# Patient Record
Sex: MALE | Race: WHITE | NOT HISPANIC OR LATINO
[De-identification: names, ages, dates, MRNs, and addresses within clinical notes are randomized per-mention and may not be internally consistent; named-entity substitution may affect disease eponyms.]

---

## 2017-01-03 ENCOUNTER — APPOINTMENT (OUTPATIENT)
Dept: OTOLARYNGOLOGY | Facility: CLINIC | Age: 62
End: 2017-01-03

## 2017-01-06 RX ORDER — CIPROFLOXACIN AND DEXAMETHASONE 3; 1 MG/ML; MG/ML
0.3-0.1 SUSPENSION/ DROPS AURICULAR (OTIC)
Qty: 5 | Refills: 3 | Status: DISCONTINUED | COMMUNITY
Start: 2017-01-03 | End: 2017-01-06

## 2017-01-10 ENCOUNTER — APPOINTMENT (OUTPATIENT)
Dept: OTOLARYNGOLOGY | Facility: CLINIC | Age: 62
End: 2017-01-10

## 2017-01-10 VITALS
SYSTOLIC BLOOD PRESSURE: 106 MMHG | BODY MASS INDEX: 27.4 KG/M2 | DIASTOLIC BLOOD PRESSURE: 70 MMHG | WEIGHT: 225 LBS | HEART RATE: 89 BPM | HEIGHT: 76 IN

## 2017-01-27 ENCOUNTER — MEDICATION RENEWAL (OUTPATIENT)
Age: 62
End: 2017-01-27

## 2017-02-07 ENCOUNTER — FORM ENCOUNTER (OUTPATIENT)
Age: 62
End: 2017-02-07

## 2017-02-08 ENCOUNTER — APPOINTMENT (OUTPATIENT)
Dept: SURGICAL ONCOLOGY | Facility: CLINIC | Age: 62
End: 2017-02-08

## 2017-02-08 ENCOUNTER — OUTPATIENT (OUTPATIENT)
Dept: OUTPATIENT SERVICES | Facility: HOSPITAL | Age: 62
LOS: 1 days | End: 2017-02-08
Payer: MEDICARE

## 2017-02-08 ENCOUNTER — APPOINTMENT (OUTPATIENT)
Dept: CT IMAGING | Facility: CLINIC | Age: 62
End: 2017-02-08

## 2017-02-08 VITALS
RESPIRATION RATE: 18 BRPM | HEART RATE: 90 BPM | OXYGEN SATURATION: 99 % | WEIGHT: 225 LBS | HEIGHT: 76 IN | SYSTOLIC BLOOD PRESSURE: 100 MMHG | BODY MASS INDEX: 27.4 KG/M2 | DIASTOLIC BLOOD PRESSURE: 68 MMHG

## 2017-02-08 DIAGNOSIS — C34.92 MALIGNANT NEOPLASM OF UNSPECIFIED PART OF LEFT BRONCHUS OR LUNG: Chronic | ICD-10-CM

## 2017-02-08 DIAGNOSIS — R06.02 SHORTNESS OF BREATH: ICD-10-CM

## 2017-02-08 DIAGNOSIS — E89.0 POSTPROCEDURAL HYPOTHYROIDISM: Chronic | ICD-10-CM

## 2017-02-08 DIAGNOSIS — C73 MALIGNANT NEOPLASM OF THYROID GLAND: ICD-10-CM

## 2017-02-08 DIAGNOSIS — C34.92 MALIGNANT NEOPLASM OF UNSPECIFIED PART OF LEFT BRONCHUS OR LUNG: ICD-10-CM

## 2017-02-08 PROCEDURE — 71250 CT THORAX DX C-: CPT

## 2017-02-13 ENCOUNTER — RESULT REVIEW (OUTPATIENT)
Age: 62
End: 2017-02-13

## 2017-03-15 ENCOUNTER — APPOINTMENT (OUTPATIENT)
Dept: PULMONOLOGY | Facility: CLINIC | Age: 62
End: 2017-03-15

## 2017-03-16 ENCOUNTER — APPOINTMENT (OUTPATIENT)
Dept: OTOLARYNGOLOGY | Facility: CLINIC | Age: 62
End: 2017-03-16

## 2017-03-16 VITALS
BODY MASS INDEX: 27.52 KG/M2 | WEIGHT: 226 LBS | SYSTOLIC BLOOD PRESSURE: 113 MMHG | HEIGHT: 76 IN | HEART RATE: 96 BPM | DIASTOLIC BLOOD PRESSURE: 75 MMHG

## 2017-03-16 DIAGNOSIS — H91.93 UNSPECIFIED HEARING LOSS, BILATERAL: ICD-10-CM

## 2017-04-10 ENCOUNTER — APPOINTMENT (OUTPATIENT)
Dept: PULMONOLOGY | Facility: CLINIC | Age: 62
End: 2017-04-10

## 2017-04-10 VITALS
BODY MASS INDEX: 27.52 KG/M2 | HEART RATE: 101 BPM | RESPIRATION RATE: 18 BRPM | OXYGEN SATURATION: 97 % | WEIGHT: 226 LBS | DIASTOLIC BLOOD PRESSURE: 80 MMHG | HEIGHT: 76 IN | SYSTOLIC BLOOD PRESSURE: 120 MMHG

## 2017-05-05 ENCOUNTER — RX RENEWAL (OUTPATIENT)
Age: 62
End: 2017-05-05

## 2017-05-11 ENCOUNTER — OUTPATIENT (OUTPATIENT)
Dept: OUTPATIENT SERVICES | Facility: HOSPITAL | Age: 62
LOS: 1 days | End: 2017-05-11
Payer: MEDICARE

## 2017-05-11 VITALS
DIASTOLIC BLOOD PRESSURE: 72 MMHG | HEIGHT: 76 IN | OXYGEN SATURATION: 98 % | TEMPERATURE: 99 F | WEIGHT: 227.08 LBS | RESPIRATION RATE: 16 BRPM | SYSTOLIC BLOOD PRESSURE: 108 MMHG | HEART RATE: 96 BPM

## 2017-05-11 DIAGNOSIS — Z98.890 OTHER SPECIFIED POSTPROCEDURAL STATES: Chronic | ICD-10-CM

## 2017-05-11 DIAGNOSIS — K22.70 BARRETT'S ESOPHAGUS WITHOUT DYSPLASIA: ICD-10-CM

## 2017-05-11 DIAGNOSIS — C34.92 MALIGNANT NEOPLASM OF UNSPECIFIED PART OF LEFT BRONCHUS OR LUNG: Chronic | ICD-10-CM

## 2017-05-11 DIAGNOSIS — G47.33 OBSTRUCTIVE SLEEP APNEA (ADULT) (PEDIATRIC): ICD-10-CM

## 2017-05-11 DIAGNOSIS — Z01.818 ENCOUNTER FOR OTHER PREPROCEDURAL EXAMINATION: ICD-10-CM

## 2017-05-11 DIAGNOSIS — Z86.010 PERSONAL HISTORY OF COLONIC POLYPS: ICD-10-CM

## 2017-05-11 DIAGNOSIS — E89.0 POSTPROCEDURAL HYPOTHYROIDISM: Chronic | ICD-10-CM

## 2017-05-11 LAB
ANION GAP SERPL CALC-SCNC: 16 MMOL/L — SIGNIFICANT CHANGE UP (ref 5–17)
BUN SERPL-MCNC: 16 MG/DL — SIGNIFICANT CHANGE UP (ref 7–23)
CALCIUM SERPL-MCNC: 9.8 MG/DL — SIGNIFICANT CHANGE UP (ref 8.4–10.5)
CHLORIDE SERPL-SCNC: 103 MMOL/L — SIGNIFICANT CHANGE UP (ref 96–108)
CO2 SERPL-SCNC: 22 MMOL/L — SIGNIFICANT CHANGE UP (ref 22–31)
CREAT SERPL-MCNC: 0.97 MG/DL — SIGNIFICANT CHANGE UP (ref 0.5–1.3)
GLUCOSE SERPL-MCNC: 110 MG/DL — HIGH (ref 70–99)
HCT VFR BLD CALC: 43.1 % — SIGNIFICANT CHANGE UP (ref 39–50)
HGB BLD-MCNC: 13.8 G/DL — SIGNIFICANT CHANGE UP (ref 13–17)
MCHC RBC-ENTMCNC: 27.6 PG — SIGNIFICANT CHANGE UP (ref 27–34)
MCHC RBC-ENTMCNC: 32 GM/DL — SIGNIFICANT CHANGE UP (ref 32–36)
MCV RBC AUTO: 86.2 FL — SIGNIFICANT CHANGE UP (ref 80–100)
PLATELET # BLD AUTO: 302 K/UL — SIGNIFICANT CHANGE UP (ref 150–400)
POTASSIUM SERPL-MCNC: 4.1 MMOL/L — SIGNIFICANT CHANGE UP (ref 3.5–5.3)
POTASSIUM SERPL-SCNC: 4.1 MMOL/L — SIGNIFICANT CHANGE UP (ref 3.5–5.3)
RBC # BLD: 5 M/UL — SIGNIFICANT CHANGE UP (ref 4.2–5.8)
RBC # FLD: 12.9 % — SIGNIFICANT CHANGE UP (ref 10.3–14.5)
SODIUM SERPL-SCNC: 141 MMOL/L — SIGNIFICANT CHANGE UP (ref 135–145)
WBC # BLD: 5.73 K/UL — SIGNIFICANT CHANGE UP (ref 3.8–10.5)
WBC # FLD AUTO: 5.73 K/UL — SIGNIFICANT CHANGE UP (ref 3.8–10.5)

## 2017-05-11 PROCEDURE — G0463: CPT

## 2017-05-11 PROCEDURE — 85027 COMPLETE CBC AUTOMATED: CPT

## 2017-05-11 PROCEDURE — 80048 BASIC METABOLIC PNL TOTAL CA: CPT

## 2017-05-11 PROCEDURE — 36415 COLL VENOUS BLD VENIPUNCTURE: CPT

## 2017-05-11 NOTE — H&P PST ADULT - PSH
H/O thyroidectomy  total 4/ 2016  History of lung surgery  2015 benign lesion @ left lung  History of prostate surgery  TURP 2014  Lung cancer, left  left upper lobe VATS 2010  Mediastinum Neoplasm  S/P mediastinoscopy 1998  S/P ear surgery  unspecified cholesteatoma - right tympanomastoidectomy 12/16/2016  S/P Lobectomy of Lung  right upper 1997, [facility]

## 2017-05-11 NOTE — H&P PST ADULT - RS GEN PE MLT RESP DETAILS PC
clear to auscultation bilaterally/diminished breath sounds, R/respirations non-labored/good air movement/airway patent

## 2017-05-11 NOTE — H&P PST ADULT - NEGATIVE CARDIOVASCULAR SYMPTOMS
no orthopnea/no paroxysmal nocturnal dyspnea/no peripheral edema/no claudication/no palpitations/no chest pain

## 2017-05-11 NOTE — H&P PST ADULT - OTHER CARE PROVIDERS
Dr Marvin-pulmonary-in [employer] [phone],  Dr [id number]-[phone] last seen 1 year ago, endo Gabriela Lee  [phone]

## 2017-05-11 NOTE — H&P PST ADULT - PMH
Abdominal hernia    Anxiety    Zamudio's esophagus without dysplasia    BPH (Benign Prostatic Hyperplasia)    COPD (Chronic Obstructive Pulmonary Disease)  treated with inhalers  Former smoker    GERD (Gastroesophageal Reflux Disease)    History of chemotherapy  and radiation 1998  History of colon polyps    Lung Cancer  right upper lobe 1997  Lung cancer, left  upper lobe 2010  LILY (Obstructive Sleep Apnea)  does not use CPAP, last used it 15 years ago  Pneumonia  1990  Thyroid nodule    Umbilical hernia    Unspecified cholesteatoma, right ear  12/2016

## 2017-05-11 NOTE — H&P PST ADULT - HISTORY OF PRESENT ILLNESS
61 year old male with past medical hx of lung cancer s/p  b/l lobectomies, COPD, sleep apnea, BPH,  thyroid cancer, underwent  total thyroidectomy 4/2016, GERD, Barretts, colon polyps, pt  presents to [facility] for scheduled EGD/colonoscopy on 5/18/17. Accompanied by wife.

## 2017-05-18 ENCOUNTER — OUTPATIENT (OUTPATIENT)
Dept: OUTPATIENT SERVICES | Facility: HOSPITAL | Age: 62
LOS: 1 days | End: 2017-05-18
Payer: MEDICARE

## 2017-05-18 ENCOUNTER — RESULT REVIEW (OUTPATIENT)
Age: 62
End: 2017-05-18

## 2017-05-18 DIAGNOSIS — Z98.890 OTHER SPECIFIED POSTPROCEDURAL STATES: Chronic | ICD-10-CM

## 2017-05-18 DIAGNOSIS — Z86.010 PERSONAL HISTORY OF COLONIC POLYPS: ICD-10-CM

## 2017-05-18 DIAGNOSIS — E89.0 POSTPROCEDURAL HYPOTHYROIDISM: Chronic | ICD-10-CM

## 2017-05-18 DIAGNOSIS — C34.92 MALIGNANT NEOPLASM OF UNSPECIFIED PART OF LEFT BRONCHUS OR LUNG: Chronic | ICD-10-CM

## 2017-05-18 DIAGNOSIS — K22.70 BARRETT'S ESOPHAGUS WITHOUT DYSPLASIA: ICD-10-CM

## 2017-05-18 DIAGNOSIS — Z01.818 ENCOUNTER FOR OTHER PREPROCEDURAL EXAMINATION: ICD-10-CM

## 2017-05-18 PROCEDURE — 45385 COLONOSCOPY W/LESION REMOVAL: CPT | Mod: PT

## 2017-05-18 PROCEDURE — 88305 TISSUE EXAM BY PATHOLOGIST: CPT

## 2017-05-18 PROCEDURE — 45381 COLONOSCOPY SUBMUCOUS NJX: CPT | Mod: PT

## 2017-05-18 PROCEDURE — 88305 TISSUE EXAM BY PATHOLOGIST: CPT | Mod: 26

## 2017-05-18 PROCEDURE — 43239 EGD BIOPSY SINGLE/MULTIPLE: CPT

## 2017-07-12 ENCOUNTER — APPOINTMENT (OUTPATIENT)
Dept: PULMONOLOGY | Facility: CLINIC | Age: 62
End: 2017-07-12

## 2017-08-21 ENCOUNTER — OUTPATIENT (OUTPATIENT)
Dept: OUTPATIENT SERVICES | Facility: HOSPITAL | Age: 62
LOS: 1 days | End: 2017-08-21
Payer: MEDICARE

## 2017-08-21 ENCOUNTER — APPOINTMENT (OUTPATIENT)
Dept: NUCLEAR MEDICINE | Facility: HOSPITAL | Age: 62
End: 2017-08-21
Payer: MEDICARE

## 2017-08-21 DIAGNOSIS — Z98.890 OTHER SPECIFIED POSTPROCEDURAL STATES: Chronic | ICD-10-CM

## 2017-08-21 DIAGNOSIS — C73 MALIGNANT NEOPLASM OF THYROID GLAND: ICD-10-CM

## 2017-08-21 DIAGNOSIS — C34.92 MALIGNANT NEOPLASM OF UNSPECIFIED PART OF LEFT BRONCHUS OR LUNG: Chronic | ICD-10-CM

## 2017-08-21 DIAGNOSIS — E89.0 POSTPROCEDURAL HYPOTHYROIDISM: Chronic | ICD-10-CM

## 2017-08-21 PROCEDURE — 99213 OFFICE O/P EST LOW 20 MIN: CPT

## 2017-08-22 ENCOUNTER — APPOINTMENT (OUTPATIENT)
Dept: NUCLEAR MEDICINE | Facility: HOSPITAL | Age: 62
End: 2017-08-22

## 2017-08-23 ENCOUNTER — APPOINTMENT (OUTPATIENT)
Dept: NUCLEAR MEDICINE | Facility: HOSPITAL | Age: 62
End: 2017-08-23

## 2017-08-25 ENCOUNTER — APPOINTMENT (OUTPATIENT)
Dept: NUCLEAR MEDICINE | Facility: HOSPITAL | Age: 62
End: 2017-08-25

## 2017-08-25 PROCEDURE — 96372 THER/PROPH/DIAG INJ SC/IM: CPT

## 2017-08-25 PROCEDURE — 78020 THYROID MET UPTAKE: CPT | Mod: 26

## 2017-08-25 PROCEDURE — A9528: CPT

## 2017-08-25 PROCEDURE — 78018 THYROID MET IMAGING BODY: CPT | Mod: 26

## 2017-08-25 PROCEDURE — 78018 THYROID MET IMAGING BODY: CPT

## 2017-08-25 PROCEDURE — 78020 THYROID MET UPTAKE: CPT

## 2017-08-28 ENCOUNTER — FORM ENCOUNTER (OUTPATIENT)
Age: 62
End: 2017-08-28

## 2017-08-29 ENCOUNTER — APPOINTMENT (OUTPATIENT)
Dept: PULMONOLOGY | Facility: CLINIC | Age: 62
End: 2017-08-29
Payer: MEDICARE

## 2017-08-29 ENCOUNTER — OUTPATIENT (OUTPATIENT)
Dept: OUTPATIENT SERVICES | Facility: HOSPITAL | Age: 62
LOS: 1 days | End: 2017-08-29
Payer: MEDICARE

## 2017-08-29 VITALS
BODY MASS INDEX: 27.28 KG/M2 | SYSTOLIC BLOOD PRESSURE: 114 MMHG | OXYGEN SATURATION: 99 % | HEART RATE: 89 BPM | DIASTOLIC BLOOD PRESSURE: 70 MMHG | RESPIRATION RATE: 17 BRPM | WEIGHT: 224 LBS | HEIGHT: 76 IN

## 2017-08-29 DIAGNOSIS — Z98.890 OTHER SPECIFIED POSTPROCEDURAL STATES: Chronic | ICD-10-CM

## 2017-08-29 DIAGNOSIS — E89.0 POSTPROCEDURAL HYPOTHYROIDISM: Chronic | ICD-10-CM

## 2017-08-29 DIAGNOSIS — C34.92 MALIGNANT NEOPLASM OF UNSPECIFIED PART OF LEFT BRONCHUS OR LUNG: ICD-10-CM

## 2017-08-29 DIAGNOSIS — C34.92 MALIGNANT NEOPLASM OF UNSPECIFIED PART OF LEFT BRONCHUS OR LUNG: Chronic | ICD-10-CM

## 2017-08-29 PROCEDURE — 99214 OFFICE O/P EST MOD 30 MIN: CPT | Mod: 25

## 2017-08-29 PROCEDURE — 94010 BREATHING CAPACITY TEST: CPT

## 2017-08-29 PROCEDURE — 71250 CT THORAX DX C-: CPT | Mod: 26

## 2017-08-29 PROCEDURE — 71250 CT THORAX DX C-: CPT

## 2017-08-29 RX ORDER — SODIUM SULFATE, POTASSIUM SULFATE, MAGNESIUM SULFATE 17.5; 3.13; 1.6 G/ML; G/ML; G/ML
17.5-3.13-1.6 SOLUTION, CONCENTRATE ORAL
Qty: 354 | Refills: 0 | Status: DISCONTINUED | COMMUNITY
Start: 2017-05-02 | End: 2017-08-29

## 2017-08-29 RX ORDER — CLARITHROMYCIN 500 MG/1
500 TABLET, FILM COATED ORAL
Qty: 20 | Refills: 0 | Status: DISCONTINUED | COMMUNITY
Start: 2017-06-02 | End: 2017-08-29

## 2017-10-17 ENCOUNTER — APPOINTMENT (OUTPATIENT)
Dept: OTOLARYNGOLOGY | Facility: CLINIC | Age: 62
End: 2017-10-17
Payer: MEDICARE

## 2017-10-17 VITALS
HEART RATE: 87 BPM | WEIGHT: 226 LBS | BODY MASS INDEX: 27.52 KG/M2 | DIASTOLIC BLOOD PRESSURE: 72 MMHG | SYSTOLIC BLOOD PRESSURE: 105 MMHG | HEIGHT: 76 IN

## 2017-10-17 PROCEDURE — 99214 OFFICE O/P EST MOD 30 MIN: CPT | Mod: 25

## 2017-10-17 PROCEDURE — 92504 EAR MICROSCOPY EXAMINATION: CPT

## 2017-10-27 NOTE — H&P PST ADULT - FAMILY HISTORY
Mother  Still living? No  Family history of heart disease, Age at diagnosis: Age Unknown     Father  Still living? No  Family history of pancreatic cancer, Age at diagnosis: Age Unknown     Sibling  Still living? Yes, Estimated age: 51-60  Family history of prostate cancer, Age at diagnosis: Age Unknown     Sibling  Still living? Yes, Estimated age: 61-70  Family history of heart disease, Age at diagnosis: Age Unknown 27-Oct-2017

## 2017-11-04 ENCOUNTER — EMERGENCY (EMERGENCY)
Facility: HOSPITAL | Age: 62
LOS: 1 days | End: 2017-11-04
Payer: MEDICARE

## 2017-11-04 DIAGNOSIS — C34.92 MALIGNANT NEOPLASM OF UNSPECIFIED PART OF LEFT BRONCHUS OR LUNG: Chronic | ICD-10-CM

## 2017-11-04 DIAGNOSIS — E89.0 POSTPROCEDURAL HYPOTHYROIDISM: Chronic | ICD-10-CM

## 2017-11-04 DIAGNOSIS — Z98.890 OTHER SPECIFIED POSTPROCEDURAL STATES: Chronic | ICD-10-CM

## 2017-11-04 PROCEDURE — 99283 EMERGENCY DEPT VISIT LOW MDM: CPT

## 2017-11-05 ENCOUNTER — EMERGENCY (EMERGENCY)
Facility: HOSPITAL | Age: 62
LOS: 1 days | Discharge: HOME | End: 2017-11-05
Attending: EMERGENCY MEDICINE | Admitting: EMERGENCY MEDICINE
Payer: MEDICARE

## 2017-11-05 VITALS
SYSTOLIC BLOOD PRESSURE: 97 MMHG | TEMPERATURE: 98 F | DIASTOLIC BLOOD PRESSURE: 59 MMHG | RESPIRATION RATE: 20 BRPM | HEART RATE: 113 BPM | OXYGEN SATURATION: 100 %

## 2017-11-05 VITALS — DIASTOLIC BLOOD PRESSURE: 69 MMHG | SYSTOLIC BLOOD PRESSURE: 115 MMHG | HEART RATE: 105 BPM

## 2017-11-05 DIAGNOSIS — Z98.890 OTHER SPECIFIED POSTPROCEDURAL STATES: ICD-10-CM

## 2017-11-05 DIAGNOSIS — Z90.2 ACQUIRED ABSENCE OF LUNG [PART OF]: ICD-10-CM

## 2017-11-05 DIAGNOSIS — Z98.890 OTHER SPECIFIED POSTPROCEDURAL STATES: Chronic | ICD-10-CM

## 2017-11-05 DIAGNOSIS — R10.30 LOWER ABDOMINAL PAIN, UNSPECIFIED: ICD-10-CM

## 2017-11-05 DIAGNOSIS — N48.89 OTHER SPECIFIED DISORDERS OF PENIS: ICD-10-CM

## 2017-11-05 DIAGNOSIS — Z90.79 ACQUIRED ABSENCE OF OTHER GENITAL ORGAN(S): ICD-10-CM

## 2017-11-05 DIAGNOSIS — Z90.89 ACQUIRED ABSENCE OF OTHER ORGANS: ICD-10-CM

## 2017-11-05 DIAGNOSIS — R33.9 RETENTION OF URINE, UNSPECIFIED: ICD-10-CM

## 2017-11-05 DIAGNOSIS — Z79.899 OTHER LONG TERM (CURRENT) DRUG THERAPY: ICD-10-CM

## 2017-11-05 DIAGNOSIS — C34.92 MALIGNANT NEOPLASM OF UNSPECIFIED PART OF LEFT BRONCHUS OR LUNG: Chronic | ICD-10-CM

## 2017-11-05 DIAGNOSIS — J44.9 CHRONIC OBSTRUCTIVE PULMONARY DISEASE, UNSPECIFIED: ICD-10-CM

## 2017-11-05 DIAGNOSIS — E89.0 POSTPROCEDURAL HYPOTHYROIDISM: Chronic | ICD-10-CM

## 2017-11-05 LAB
APPEARANCE UR: ABNORMAL
BILIRUB UR-MCNC: NEGATIVE — SIGNIFICANT CHANGE UP
COLOR SPEC: YELLOW — SIGNIFICANT CHANGE UP
DIFF PNL FLD: ABNORMAL
GLUCOSE UR QL: NEGATIVE — SIGNIFICANT CHANGE UP
KETONES UR-MCNC: NEGATIVE — SIGNIFICANT CHANGE UP
LEUKOCYTE ESTERASE UR-ACNC: ABNORMAL
NITRITE UR-MCNC: NEGATIVE — SIGNIFICANT CHANGE UP
PH UR: 6 — SIGNIFICANT CHANGE UP (ref 5–8)
PROT UR-MCNC: 150 MG/DL
RBC CASTS # UR COMP ASSIST: >50 /HPF (ref 0–2)
SP GR SPEC: 1.02 — SIGNIFICANT CHANGE UP (ref 1.01–1.02)
UROBILINOGEN FLD QL: NEGATIVE — SIGNIFICANT CHANGE UP
WBC UR QL: >50 /HPF (ref 0–5)

## 2017-11-05 PROCEDURE — 51702 INSERT TEMP BLADDER CATH: CPT

## 2017-11-05 PROCEDURE — 81001 URINALYSIS AUTO W/SCOPE: CPT

## 2017-11-05 PROCEDURE — 87086 URINE CULTURE/COLONY COUNT: CPT

## 2017-11-05 PROCEDURE — 99283 EMERGENCY DEPT VISIT LOW MDM: CPT | Mod: 25

## 2017-11-05 PROCEDURE — 99283 EMERGENCY DEPT VISIT LOW MDM: CPT | Mod: 25,GC

## 2017-11-05 RX ORDER — CEPHALEXIN 500 MG
1 CAPSULE ORAL
Qty: 30 | Refills: 0 | OUTPATIENT
Start: 2017-11-05 | End: 2017-11-15

## 2017-11-05 RX ORDER — CEPHALEXIN 500 MG
500 CAPSULE ORAL ONCE
Qty: 0 | Refills: 0 | Status: COMPLETED | OUTPATIENT
Start: 2017-11-05 | End: 2017-11-05

## 2017-11-05 RX ADMIN — Medication 500 MILLIGRAM(S): at 10:24

## 2017-11-05 NOTE — ED PROVIDER NOTE - OBJECTIVE STATEMENT
62M hx BPH s/p TURP 3 years ago p/w urinary retention for 1 day. Seen at OSH 1 week ago for retention, bloom placed, removed 4 days ago. Seen at OSH yesterday d/t recurrent retention, bloom replaced but pt describes traumatic placement now causing pain in the distal penis and urine leak around the bloom. Associated w/ mild lower abd pain. No fever/chills/n/v.

## 2017-11-05 NOTE — ED PROVIDER NOTE - CARE PLAN
Principal Discharge DX:	Urinary retention  Instructions for follow-up, activity and diet:	Keflex 1 tab by mouth three times a day. Follow up with your Urologist this week. Increase fluids. Motrin 600mg every 8 hours with food for pain. Worsening pain, new fever, chills, nausea, vomiting return to ER

## 2017-11-05 NOTE — ED PROCEDURE NOTE - PROCEDURE ADDITIONAL DETAILS
***Carlos Manuel Cifuentes MD*** Attending physician was available for the key components of the procedure, the patient tolerated well. There were no complications with the procedure.

## 2017-11-05 NOTE — ED PROVIDER NOTE - PSH
H/O thyroidectomy  total 4/ 2016  History of lung surgery  2015 benign lesion @ left lung  History of prostate surgery  TURP 2014  Lung cancer, left  left upper lobe VATS 2010  Mediastinum Neoplasm  S/P mediastinoscopy 1998  S/P ear surgery  unspecified cholesteatoma - right tympanomastoidectomy 12/16/2016  S/P Lobectomy of Lung  right upper 1997, [facility]

## 2017-11-05 NOTE — ED PROVIDER NOTE - PLAN OF CARE
Keflex 1 tab by mouth three times a day. Follow up with your Urologist this week. Increase fluids. Motrin 600mg every 8 hours with food for pain. Worsening pain, new fever, chills, nausea, vomiting return to ER

## 2017-11-05 NOTE — ED ADULT NURSE REASSESSMENT NOTE - NS ED NURSE REASSESS COMMENT FT1
16 fr coude f/c to bsd reinserted by Md Amato and is draining cloudy yellow urine.  pt states that he feels much better post reinsertion.  urine specimens collected, as per md's orders and pt is resting in bed awaiting md reeval and dispo.  will continue to monitor.
Recvd pt awake, alert and responsive to all stimuli.  no sob or respiratory distress noted at this time.  pt c/o penile pain for improperly placed f/c that was put in at another institution.  Md Amato currently assessing pt.  will continue to monitor.

## 2017-11-05 NOTE — ED ADULT NURSE NOTE - PSH
H/O thyroidectomy  total 4/ 2016  History of lung surgery  2015 benign lesion @ left lung  History of prostate surgery  TURP 2014  Lung cancer, left  left upper lobe VATS 2010  Mediastinum Neoplasm  S/P mediastinoscopy 1998  S/P ear surgery  unspecified cholesteatoma - right tympanomastoidectomy 12/16/2016  S/P Lobectomy of Lung  right upper 1997, [facility]

## 2017-11-05 NOTE — ED PROVIDER NOTE - PROGRESS NOTE DETAILS
Karrie KOTHARI: Patient is a 61 yo M with hx of BPH s/p TURP p/w bloom discomfort, pain in penis and lower abdomen. Patient signed out pending labs, u/a, bloom replacement and reassessment. Karrie KOTHARI: Patient is a 63 yo M with hx of BPH s/p TURP p/w bloom discomfort, pain in penis and lower abdomen. Patient signed out pending u/a and discharge. Patient had bloom exchanged and it was tacked down. Previous catheter was not tacked down and pulling on penis.

## 2017-11-05 NOTE — ED ADULT NURSE NOTE - OBJECTIVE STATEMENT
Pt is a 62YOM HX of anxiety, hernia, pisano's esophagus, BPH, COPD, smoker, GERD, colon polyps, lung CA, LILY received ambulatory A&Ox4 complaining of urinary retention. Pt states that he had a TURP 3 years ago and since that time has been having ongoing urinary retention issues. Pt states that he had an episode of urinary retention last week while in [place] and had a Gaitan placed. Pt went to [facility] yesterday and his Gaitan was changed. Pt states that he has been having pain since insertion with urine leaking from around the catheter. Pt states "it never hurts this much, I just want it out". Pt complaining of abdominal pain/distention. No headache, dizziness, chest pain, SOB, nausea, vomiting, fever or chills. MD at bedside, urology paged. Bladder scan upon arrival shows 45ml. MD aware. Wife at bedside, will continue to monitor.

## 2017-11-05 NOTE — ED PROVIDER NOTE - MEDICAL DECISION MAKING DETAILS
Recurrent retention w/ bloom complication - plan to replace bloom, ED attempt vs urology consult, assess for UTI. Recurrent retention w/ lboom complication - plan to replace bloom, ED attempt vs urology consult, assess for UTI.  Dr. Murphy: Male patient with complaints of penile pain and mild urinary difficulty since undergoing Bloom placement at OSH. Physical exam revealed Bloom catheter that was not tacked down and was pulling on patient's penis. Bloom catheter was replaced and tacked at ED. Urinalysis was ordered.

## 2017-11-06 LAB
CULTURE RESULTS: NO GROWTH — SIGNIFICANT CHANGE UP
SPECIMEN SOURCE: SIGNIFICANT CHANGE UP

## 2017-12-06 ENCOUNTER — APPOINTMENT (OUTPATIENT)
Dept: PULMONOLOGY | Facility: CLINIC | Age: 62
End: 2017-12-06
Payer: MEDICARE

## 2017-12-06 VITALS
RESPIRATION RATE: 17 BRPM | OXYGEN SATURATION: 99 % | DIASTOLIC BLOOD PRESSURE: 76 MMHG | BODY MASS INDEX: 25.94 KG/M2 | SYSTOLIC BLOOD PRESSURE: 102 MMHG | HEART RATE: 91 BPM | HEIGHT: 76 IN | WEIGHT: 213 LBS

## 2017-12-06 PROCEDURE — 94010 BREATHING CAPACITY TEST: CPT

## 2017-12-06 PROCEDURE — 94729 DIFFUSING CAPACITY: CPT

## 2017-12-06 PROCEDURE — 99214 OFFICE O/P EST MOD 30 MIN: CPT | Mod: 25

## 2018-03-19 ENCOUNTER — APPOINTMENT (OUTPATIENT)
Dept: PULMONOLOGY | Facility: CLINIC | Age: 63
End: 2018-03-19
Payer: MEDICARE

## 2018-03-19 VITALS
HEART RATE: 93 BPM | HEIGHT: 76 IN | BODY MASS INDEX: 25.94 KG/M2 | WEIGHT: 213 LBS | DIASTOLIC BLOOD PRESSURE: 70 MMHG | SYSTOLIC BLOOD PRESSURE: 110 MMHG | OXYGEN SATURATION: 96 %

## 2018-03-19 PROCEDURE — 94640 AIRWAY INHALATION TREATMENT: CPT | Mod: 59

## 2018-03-19 PROCEDURE — 99214 OFFICE O/P EST MOD 30 MIN: CPT | Mod: 25

## 2018-03-19 PROCEDURE — 94010 BREATHING CAPACITY TEST: CPT

## 2018-03-19 RX ORDER — CEPHALEXIN 500 MG/1
500 CAPSULE ORAL
Qty: 30 | Refills: 0 | Status: DISCONTINUED | COMMUNITY
Start: 2017-11-27 | End: 2018-03-19

## 2018-03-19 RX ORDER — PREDNISONE 10 MG/1
10 TABLET ORAL
Qty: 1 | Refills: 0 | Status: DISCONTINUED | COMMUNITY
Start: 2017-06-02 | End: 2018-03-19

## 2018-03-21 ENCOUNTER — RX RENEWAL (OUTPATIENT)
Age: 63
End: 2018-03-21

## 2018-04-06 ENCOUNTER — APPOINTMENT (OUTPATIENT)
Dept: PULMONOLOGY | Facility: CLINIC | Age: 63
End: 2018-04-06

## 2018-05-15 ENCOUNTER — FORM ENCOUNTER (OUTPATIENT)
Age: 63
End: 2018-05-15

## 2018-05-16 ENCOUNTER — OUTPATIENT (OUTPATIENT)
Dept: OUTPATIENT SERVICES | Facility: HOSPITAL | Age: 63
LOS: 1 days | End: 2018-05-16
Payer: MEDICARE

## 2018-05-16 ENCOUNTER — APPOINTMENT (OUTPATIENT)
Dept: CT IMAGING | Facility: CLINIC | Age: 63
End: 2018-05-16
Payer: MEDICARE

## 2018-05-16 DIAGNOSIS — C34.92 MALIGNANT NEOPLASM OF UNSPECIFIED PART OF LEFT BRONCHUS OR LUNG: Chronic | ICD-10-CM

## 2018-05-16 DIAGNOSIS — Z98.890 OTHER SPECIFIED POSTPROCEDURAL STATES: Chronic | ICD-10-CM

## 2018-05-16 DIAGNOSIS — C34.92 MALIGNANT NEOPLASM OF UNSPECIFIED PART OF LEFT BRONCHUS OR LUNG: ICD-10-CM

## 2018-05-16 DIAGNOSIS — E89.0 POSTPROCEDURAL HYPOTHYROIDISM: Chronic | ICD-10-CM

## 2018-05-16 PROCEDURE — 71250 CT THORAX DX C-: CPT | Mod: 26

## 2018-05-16 PROCEDURE — 71250 CT THORAX DX C-: CPT

## 2018-07-15 ENCOUNTER — FORM ENCOUNTER (OUTPATIENT)
Age: 63
End: 2018-07-15

## 2018-07-16 ENCOUNTER — OUTPATIENT (OUTPATIENT)
Dept: OUTPATIENT SERVICES | Facility: HOSPITAL | Age: 63
LOS: 1 days | End: 2018-07-16
Payer: MEDICARE

## 2018-07-16 ENCOUNTER — APPOINTMENT (OUTPATIENT)
Dept: ULTRASOUND IMAGING | Facility: CLINIC | Age: 63
End: 2018-07-16
Payer: MEDICARE

## 2018-07-16 DIAGNOSIS — Z98.890 OTHER SPECIFIED POSTPROCEDURAL STATES: Chronic | ICD-10-CM

## 2018-07-16 DIAGNOSIS — C34.92 MALIGNANT NEOPLASM OF UNSPECIFIED PART OF LEFT BRONCHUS OR LUNG: Chronic | ICD-10-CM

## 2018-07-16 DIAGNOSIS — E89.0 POSTPROCEDURAL HYPOTHYROIDISM: Chronic | ICD-10-CM

## 2018-07-16 DIAGNOSIS — Z00.8 ENCOUNTER FOR OTHER GENERAL EXAMINATION: ICD-10-CM

## 2018-07-16 PROBLEM — Z87.891 PERSONAL HISTORY OF NICOTINE DEPENDENCE: Chronic | Status: ACTIVE | Noted: 2017-05-11

## 2018-07-16 PROBLEM — Z92.21 PERSONAL HISTORY OF ANTINEOPLASTIC CHEMOTHERAPY: Chronic | Status: ACTIVE | Noted: 2017-05-11

## 2018-07-16 PROBLEM — K22.70 BARRETT'S ESOPHAGUS WITHOUT DYSPLASIA: Chronic | Status: ACTIVE | Noted: 2017-05-11

## 2018-07-16 PROBLEM — Z86.010 PERSONAL HISTORY OF COLONIC POLYPS: Chronic | Status: ACTIVE | Noted: 2017-05-11

## 2018-07-16 PROCEDURE — 76536 US EXAM OF HEAD AND NECK: CPT | Mod: 26

## 2018-07-16 PROCEDURE — 76536 US EXAM OF HEAD AND NECK: CPT

## 2018-07-19 ENCOUNTER — NON-APPOINTMENT (OUTPATIENT)
Age: 63
End: 2018-07-19

## 2018-07-19 ENCOUNTER — APPOINTMENT (OUTPATIENT)
Dept: PULMONOLOGY | Facility: CLINIC | Age: 63
End: 2018-07-19
Payer: MEDICARE

## 2018-07-19 ENCOUNTER — MEDICATION RENEWAL (OUTPATIENT)
Age: 63
End: 2018-07-19

## 2018-07-19 VITALS
OXYGEN SATURATION: 98 % | DIASTOLIC BLOOD PRESSURE: 70 MMHG | WEIGHT: 216 LBS | BODY MASS INDEX: 26.3 KG/M2 | HEIGHT: 76 IN | HEART RATE: 86 BPM | SYSTOLIC BLOOD PRESSURE: 124 MMHG

## 2018-07-19 DIAGNOSIS — Z09 ENCOUNTER FOR FOLLOW-UP EXAMINATION AFTER COMPLETED TREATMENT FOR CONDITIONS OTHER THAN MALIGNANT NEOPLASM: ICD-10-CM

## 2018-07-19 PROCEDURE — 94010 BREATHING CAPACITY TEST: CPT

## 2018-07-19 PROCEDURE — 99214 OFFICE O/P EST MOD 30 MIN: CPT | Mod: 25

## 2018-07-19 RX ORDER — TOBRAMYCIN AND DEXAMETHASONE 3; 1 MG/ML; MG/ML
0.3-0.1 SUSPENSION/ DROPS OPHTHALMIC TWICE DAILY
Qty: 5 | Refills: 1 | Status: DISCONTINUED | COMMUNITY
Start: 2017-01-06 | End: 2018-07-19

## 2018-07-23 ENCOUNTER — RX RENEWAL (OUTPATIENT)
Age: 63
End: 2018-07-23

## 2018-08-24 ENCOUNTER — OUTPATIENT (OUTPATIENT)
Dept: OUTPATIENT SERVICES | Facility: HOSPITAL | Age: 63
LOS: 1 days | End: 2018-08-24
Payer: MEDICARE

## 2018-08-24 VITALS
TEMPERATURE: 98 F | HEART RATE: 90 BPM | OXYGEN SATURATION: 98 % | DIASTOLIC BLOOD PRESSURE: 73 MMHG | SYSTOLIC BLOOD PRESSURE: 103 MMHG | RESPIRATION RATE: 18 BRPM | WEIGHT: 218.04 LBS | HEIGHT: 76 IN

## 2018-08-24 DIAGNOSIS — Z98.890 OTHER SPECIFIED POSTPROCEDURAL STATES: Chronic | ICD-10-CM

## 2018-08-24 DIAGNOSIS — Z86.010 PERSONAL HISTORY OF COLONIC POLYPS: ICD-10-CM

## 2018-08-24 DIAGNOSIS — E89.0 POSTPROCEDURAL HYPOTHYROIDISM: Chronic | ICD-10-CM

## 2018-08-24 DIAGNOSIS — Z01.818 ENCOUNTER FOR OTHER PREPROCEDURAL EXAMINATION: ICD-10-CM

## 2018-08-24 DIAGNOSIS — G47.33 OBSTRUCTIVE SLEEP APNEA (ADULT) (PEDIATRIC): ICD-10-CM

## 2018-08-24 DIAGNOSIS — C34.92 MALIGNANT NEOPLASM OF UNSPECIFIED PART OF LEFT BRONCHUS OR LUNG: Chronic | ICD-10-CM

## 2018-08-24 DIAGNOSIS — K22.70 BARRETT'S ESOPHAGUS WITHOUT DYSPLASIA: ICD-10-CM

## 2018-08-24 LAB
ANION GAP SERPL CALC-SCNC: 11 MMOL/L — SIGNIFICANT CHANGE UP (ref 5–17)
BUN SERPL-MCNC: 16 MG/DL — SIGNIFICANT CHANGE UP (ref 7–23)
CALCIUM SERPL-MCNC: 10.1 MG/DL — SIGNIFICANT CHANGE UP (ref 8.4–10.5)
CHLORIDE SERPL-SCNC: 100 MMOL/L — SIGNIFICANT CHANGE UP (ref 96–108)
CO2 SERPL-SCNC: 25 MMOL/L — SIGNIFICANT CHANGE UP (ref 22–31)
CREAT SERPL-MCNC: 0.91 MG/DL — SIGNIFICANT CHANGE UP (ref 0.5–1.3)
GLUCOSE SERPL-MCNC: 99 MG/DL — SIGNIFICANT CHANGE UP (ref 70–99)
HCT VFR BLD CALC: 47.6 % — SIGNIFICANT CHANGE UP (ref 39–50)
HGB BLD-MCNC: 15 G/DL — SIGNIFICANT CHANGE UP (ref 13–17)
MCHC RBC-ENTMCNC: 27.6 PG — SIGNIFICANT CHANGE UP (ref 27–34)
MCHC RBC-ENTMCNC: 31.5 GM/DL — LOW (ref 32–36)
MCV RBC AUTO: 87.7 FL — SIGNIFICANT CHANGE UP (ref 80–100)
PLATELET # BLD AUTO: 267 K/UL — SIGNIFICANT CHANGE UP (ref 150–400)
POTASSIUM SERPL-MCNC: 3.9 MMOL/L — SIGNIFICANT CHANGE UP (ref 3.5–5.3)
POTASSIUM SERPL-SCNC: 3.9 MMOL/L — SIGNIFICANT CHANGE UP (ref 3.5–5.3)
RBC # BLD: 5.43 M/UL — SIGNIFICANT CHANGE UP (ref 4.2–5.8)
RBC # FLD: 13.3 % — SIGNIFICANT CHANGE UP (ref 10.3–14.5)
SODIUM SERPL-SCNC: 136 MMOL/L — SIGNIFICANT CHANGE UP (ref 135–145)
WBC # BLD: 6.67 K/UL — SIGNIFICANT CHANGE UP (ref 3.8–10.5)
WBC # FLD AUTO: 6.67 K/UL — SIGNIFICANT CHANGE UP (ref 3.8–10.5)

## 2018-08-24 PROCEDURE — 85027 COMPLETE CBC AUTOMATED: CPT

## 2018-08-24 PROCEDURE — G0463: CPT

## 2018-08-24 PROCEDURE — 80048 BASIC METABOLIC PNL TOTAL CA: CPT

## 2018-08-24 RX ORDER — UMECLIDINIUM 62.5 UG/1
0 AEROSOL, POWDER ORAL
Qty: 0 | Refills: 0 | COMMUNITY

## 2018-08-24 NOTE — H&P PST ADULT - RS GEN PE MLT RESP DETAILS PC
clear to auscultation bilaterally/respirations non-labored/airway patent/good air movement/diminished breath sounds, R

## 2018-08-24 NOTE — H&P PST ADULT - OTHER CARE PROVIDERS
Dr Marvin-pulmonary [phone],  Dr [id number]-[phone] , christy Lee  [phone]

## 2018-08-24 NOTE — H&P PST ADULT - PSH
H/O thyroidectomy  total 4/ 2016  History of lung surgery  2015 benign lesion @ left lung  History of prostate surgery  TURP 2014 and 2017  Lung cancer, left  left upper lobe VATS 2010  Mediastinum Neoplasm  S/P mediastinoscopy 1998  S/P ear surgery  unspecified cholesteatoma - right tympanomastoidectomy 12/16/2016  S/P Lobectomy of Lung  right upper 1997, [facility]

## 2018-08-24 NOTE — H&P PST ADULT - HISTORY OF PRESENT ILLNESS
61 year old male with past medical hx of lung cancer s/p  b/l lobectomies (chemo/radiation 2010), COPD (mild), LILY non-compliant with CPAP, BPH,  thyroid cancer, underwent  total thyroidectomy (4/2016/radiation), GERD, pisano esophagus, C/O  colon polyps found on routine colonoscopy  presents to [facility] for scheduled colonoscopy anes on 8/30/18

## 2018-08-30 ENCOUNTER — OUTPATIENT (OUTPATIENT)
Dept: OUTPATIENT SERVICES | Facility: HOSPITAL | Age: 63
LOS: 1 days | End: 2018-08-30
Payer: MEDICARE

## 2018-08-30 ENCOUNTER — RESULT REVIEW (OUTPATIENT)
Age: 63
End: 2018-08-30

## 2018-08-30 DIAGNOSIS — Z98.890 OTHER SPECIFIED POSTPROCEDURAL STATES: Chronic | ICD-10-CM

## 2018-08-30 DIAGNOSIS — K22.70 BARRETT'S ESOPHAGUS WITHOUT DYSPLASIA: ICD-10-CM

## 2018-08-30 DIAGNOSIS — C34.92 MALIGNANT NEOPLASM OF UNSPECIFIED PART OF LEFT BRONCHUS OR LUNG: Chronic | ICD-10-CM

## 2018-08-30 DIAGNOSIS — Z01.818 ENCOUNTER FOR OTHER PREPROCEDURAL EXAMINATION: ICD-10-CM

## 2018-08-30 DIAGNOSIS — E89.0 POSTPROCEDURAL HYPOTHYROIDISM: Chronic | ICD-10-CM

## 2018-08-30 PROCEDURE — 45380 COLONOSCOPY AND BIOPSY: CPT | Mod: PT

## 2018-08-30 PROCEDURE — 88305 TISSUE EXAM BY PATHOLOGIST: CPT

## 2018-08-30 PROCEDURE — 88305 TISSUE EXAM BY PATHOLOGIST: CPT | Mod: 26

## 2018-09-01 LAB — SURGICAL PATHOLOGY STUDY: SIGNIFICANT CHANGE UP

## 2019-01-07 ENCOUNTER — NON-APPOINTMENT (OUTPATIENT)
Age: 64
End: 2019-01-07

## 2019-01-07 ENCOUNTER — APPOINTMENT (OUTPATIENT)
Dept: PULMONOLOGY | Facility: CLINIC | Age: 64
End: 2019-01-07
Payer: MEDICARE

## 2019-01-07 ENCOUNTER — FORM ENCOUNTER (OUTPATIENT)
Age: 64
End: 2019-01-07

## 2019-01-07 VITALS
DIASTOLIC BLOOD PRESSURE: 60 MMHG | WEIGHT: 223 LBS | HEIGHT: 77 IN | RESPIRATION RATE: 17 BRPM | BODY MASS INDEX: 26.33 KG/M2 | SYSTOLIC BLOOD PRESSURE: 116 MMHG | HEART RATE: 91 BPM | OXYGEN SATURATION: 98 %

## 2019-01-07 PROCEDURE — 99214 OFFICE O/P EST MOD 30 MIN: CPT | Mod: 25

## 2019-01-07 PROCEDURE — 94010 BREATHING CAPACITY TEST: CPT

## 2019-01-07 NOTE — PHYSICAL EXAM
[General Appearance - Well Developed] : well developed [Normal Appearance] : normal appearance [Well Groomed] : well groomed [General Appearance - Well Nourished] : well nourished [No Deformities] : no deformities [General Appearance - In No Acute Distress] : no acute distress [Normal Conjunctiva] : the conjunctiva exhibited no abnormalities [Eyelids - No Xanthelasma] : the eyelids demonstrated no xanthelasmas [Normal Oropharynx] : normal oropharynx [II] : II [Neck Appearance] : the appearance of the neck was normal [Neck Cervical Mass (___cm)] : no neck mass was observed [Jugular Venous Distention Increased] : there was no jugular-venous distention [Thyroid Diffuse Enlargement] : the thyroid was not enlarged [Thyroid Nodule] : there were no palpable thyroid nodules [Heart Rate And Rhythm] : heart rate and rhythm were normal [Heart Sounds] : normal S1 and S2 [Murmurs] : no murmurs present [Respiration, Rhythm And Depth] : normal respiratory rhythm and effort [Exaggerated Use Of Accessory Muscles For Inspiration] : no accessory muscle use [Auscultation Breath Sounds / Voice Sounds] : lungs were clear to auscultation bilaterally [Abdomen Soft] : soft [Abdomen Tenderness] : non-tender [Abdomen Mass (___ Cm)] : no abdominal mass palpated [Abnormal Walk] : normal gait [Gait - Sufficient For Exercise Testing] : the gait was sufficient for exercise testing [Nail Clubbing] : no clubbing of the fingernails [Cyanosis, Localized] : no localized cyanosis [Petechial Hemorrhages (___cm)] : no petechial hemorrhages [Skin Color & Pigmentation] : normal skin color and pigmentation [] : no rash [No Venous Stasis] : no venous stasis [Skin Lesions] : no skin lesions [No Skin Ulcers] : no skin ulcer [No Xanthoma] : no  xanthoma was observed [Deep Tendon Reflexes (DTR)] : deep tendon reflexes were 2+ and symmetric [Sensation] : the sensory exam was normal to light touch and pinprick [No Focal Deficits] : no focal deficits [Oriented To Time, Place, And Person] : oriented to person, place, and time [Impaired Insight] : insight and judgment were intact [Affect] : the affect was normal [FreeTextEntry1] : Umbilical hernia

## 2019-01-07 NOTE — HISTORY OF PRESENT ILLNESS
[FreeTextEntry1] : Mr. Caro is a 63 year old male presenting to the office for a follow up visit for abnormal PFTs, allergic rhinitis, adenocarcinoma of the lung, COPD, GERD, LILY and shortness of breath. His chief complaint is SOB when climbing stairs. \par - he reports having a but of PNA while in [place] in November. He was placed on two different abx and steroids. He is currently improved\par - He describes SOB when climbing stairs \par - He states that his breathing, as usual, is not good or bad\par - He notes coughing and pain in his lower right back\par - His cough occurs throughout the day. \par - His cough is productive of yellow sputum\par - He is frequently blowing his nose.\par - He feels today that his sputum comes more from his chest than his sinuses. \par - He becomes dizzy every once in a while\par - He has dysphagia with solids in both the night time and day time\par - He is a fast eater. \par - His weight has fluctuated between 212 and 220. \par - He denies any headaches, nausea, vomiting, fever, chills, sweats, chest pain, chest pressure, palpitations, wheezing, fatigue, diarrhea, constipation; myalgias, dizziness, leg swelling, leg pain, itchy eyes, itchy ears, heartburn, reflux, or sour taste in the mouth.

## 2019-01-07 NOTE — REASON FOR VISIT
[Follow-Up] : a follow-up visit [FreeTextEntry1] : abnormal PFTs, allergic rhinitis, adenocarcinoma of the lung, COPD, GERD, LILY and shortness of breath

## 2019-01-07 NOTE — PROCEDURE
[FreeTextEntry1] : PFT- spi reveals severe restrictive dysfunction; FEV1 was 1.94L which is 44% of predicted; normal flow volume loop

## 2019-01-07 NOTE — ASSESSMENT
[FreeTextEntry1] : Mr. Caro has a history of COPD, multiple lung cancers, OSAS, allergy, and GERD. He is doing well from a pulmonary perspective.\par \par His shortness of breath is multifactorial due to\par - history of COPD from prior smoking\par - restrictive disfunction due to multiple lung surgeries \par - obesity/out of shape \par - vocal cords\par - poor mechanics of breathing\par \par Problem 1: COPD\par -nebulizer with albuterol up to QID\par - continue Symbicort 160 2 inhalations BID\par - Add Incruse 1 inhalation QD \par - follow up with pulmonary rehab\par -COPD is a progressive disease and although it can’t be cured , appropriate management can slow its progression, reduce frequency and severity of exacerbations, and improve symptoms and the patient quality of life. Hospitalizations are the greatest contributor to the total COPD costs and account for up to 87% of total COPD related costs. Exacerbations are the main cause of admissions and subsequently account for the 40-75% of COPD costs. Inhaled maintenance therapy reduces the incidence of exacerbations in patients with stable COPD. Incorrect inhaler use and nonadherence are major obstacles to achieving COPD treatment goals. Many COPD patients have challenges (impaired inhalation, limited dexterity, reduced cognition: that limit their ability to correctly use their COPD treatment devices resulting in reduced symptom control. Of most importance is smoking cessation and early intervention with respiratory illnesses and contemplation for pulmonary rehab to enhance quality of life. \par \par Problem 2: Obesity\par -Weight loss, exercise, and diet control were discussed and are highly encouraged. Treatment options were given such as, aqua therapy, and contacting a nutritionist. Recommended to use the elliptical, stationary bike, less use of treadmill. Obesity is associated with worsening asthma, shortness of breath, and potential for cardiac disease, diabetes, and other underlying medical conditions. \par \par Problem 3: allergies/sinuses\par - continue Flonase 1 spray each nostril\par - continue XYZal 5 mg QHS\par \par -Environmental measures for allergies were encouraged including mattress and pillow cover, air purifier, and environmental controls. \par \par Problem 4: GERD\par - continue Omeprazole 40 mg BID (pre-breakfast/pre-dinner) \par -Rule of 2s: avoid eating too much, eating too late, eating too spicy, eating two hours before bed\par -Things to avoid including overeating, spicy foods, tight clothing, eating within three hours of bed, this list is not all inclusive. \par -For treatment of reflux, possible options discussed including diet control, H2 blockers, PPIs, as well as coating motility agents discussed as treatment options. Timing of meals and proximity of last meal to sleep were discussed. If symptoms persist, a formal gastrointestinal evaluation is needed. \par \par Problem 5: sleep apnea,\par - side sleeping (positional change)\par -has refused treatment \par \par -Sleep apnea is associated with adverse clinical consequences which an affect most organ systems. Cardiovascular disease risk includes arrhythmias, atrial fibrillation, hypertension, coronary artery disease, and stroke. Metabolic disorders include diabetes type 2, non-alcoholic fatty liver disease. Mood disorder especially depression; and cognitive decline especially in the elderly. Associations with chronic reflux/Zamudio’s esophagus some but not all inclusive. \par -Reasons to assess this include arousal consistent with hypopnea; respiratory events most prominent in REM sleep or supine position; therefore sleep staging and body position are important for accurate diagnosis and estimation of AHI. \par \par problem 6: lung cancer screening\par -recommended follow up chest CT yearly, next CT due in May 2019\par \par problem 7: health maintenance \par -he is has Xanax 0.5 mg PRN \par -received influenza vaccination 2018\par -recommended strep pneumonia vaccines: Prevnar-13 vaccine, followed by Pneumo vaccine 23 one year following\par -recommended early intervention for URIs\par -recommended regular osteoporosis evaluations\par -recommended early dermatological evaluations\par -recommended after the age of 50 to the age of 70, colonoscopy every 5 years \par  \par \par F/U in 4-6 months\par He is encouraged to call with any questions, concerns, and changes. \par

## 2019-01-07 NOTE — ADDENDUM
[FreeTextEntry1] : Documented by Darrell Buchanan acting as a scribe for Dr. Cliff Marvin on 1/7/2019\par \par All medical record entries made by the Scribe were at my, Dr. Cliff Marvin's, direction and personally dictated by me on 1/7/2019. I have reviewed the chart and agree that the record accurately reflects my personal performance of the history, physical exam, assessment and plan. I have also personally directed, reviewed, and agree with the discharge instructions. \par \par \par \par \par

## 2019-01-08 ENCOUNTER — OUTPATIENT (OUTPATIENT)
Dept: OUTPATIENT SERVICES | Facility: HOSPITAL | Age: 64
LOS: 1 days | End: 2019-01-08
Payer: MEDICARE

## 2019-01-08 ENCOUNTER — APPOINTMENT (OUTPATIENT)
Dept: CT IMAGING | Facility: CLINIC | Age: 64
End: 2019-01-08
Payer: MEDICARE

## 2019-01-08 DIAGNOSIS — C34.92 MALIGNANT NEOPLASM OF UNSPECIFIED PART OF LEFT BRONCHUS OR LUNG: Chronic | ICD-10-CM

## 2019-01-08 DIAGNOSIS — E89.0 POSTPROCEDURAL HYPOTHYROIDISM: Chronic | ICD-10-CM

## 2019-01-08 DIAGNOSIS — C34.92 MALIGNANT NEOPLASM OF UNSPECIFIED PART OF LEFT BRONCHUS OR LUNG: ICD-10-CM

## 2019-01-08 DIAGNOSIS — Z98.890 OTHER SPECIFIED POSTPROCEDURAL STATES: Chronic | ICD-10-CM

## 2019-01-08 PROCEDURE — 71250 CT THORAX DX C-: CPT

## 2019-01-08 PROCEDURE — 71250 CT THORAX DX C-: CPT | Mod: 26

## 2019-01-15 ENCOUNTER — APPOINTMENT (OUTPATIENT)
Dept: OTOLARYNGOLOGY | Facility: CLINIC | Age: 64
End: 2019-01-15
Payer: MEDICARE

## 2019-01-15 VITALS
BODY MASS INDEX: 26.33 KG/M2 | SYSTOLIC BLOOD PRESSURE: 107 MMHG | WEIGHT: 223 LBS | HEART RATE: 90 BPM | DIASTOLIC BLOOD PRESSURE: 73 MMHG | HEIGHT: 77 IN

## 2019-01-15 PROCEDURE — 92567 TYMPANOMETRY: CPT

## 2019-01-15 PROCEDURE — 99214 OFFICE O/P EST MOD 30 MIN: CPT | Mod: 25

## 2019-01-15 PROCEDURE — 92504 EAR MICROSCOPY EXAMINATION: CPT

## 2019-01-15 PROCEDURE — 92557 COMPREHENSIVE HEARING TEST: CPT

## 2019-01-15 RX ORDER — LEVOTHYROXINE SODIUM 0.12 MG/1
125 TABLET ORAL
Qty: 180 | Refills: 0 | Status: DISCONTINUED | COMMUNITY
Start: 2017-03-30 | End: 2019-01-15

## 2019-01-15 RX ORDER — UMECLIDINIUM 62.5 UG/1
62.5 AEROSOL, POWDER ORAL
Qty: 3 | Refills: 1 | Status: DISCONTINUED | COMMUNITY
Start: 2017-04-10 | End: 2019-01-15

## 2019-01-15 RX ORDER — ALPRAZOLAM 0.5 MG/1
0.5 TABLET ORAL
Qty: 120 | Refills: 0 | Status: DISCONTINUED | COMMUNITY
Start: 2017-04-10 | End: 2019-01-15

## 2019-01-15 RX ORDER — LIDOCAINE HYDROCHLORIDE 20 MG/ML
2 JELLY TOPICAL
Qty: 30 | Refills: 0 | Status: DISCONTINUED | COMMUNITY
Start: 2017-11-20 | End: 2019-01-15

## 2019-01-15 RX ORDER — TIOTROPIUM BROMIDE 18 UG/1
18 CAPSULE ORAL; RESPIRATORY (INHALATION)
Qty: 3 | Refills: 1 | Status: DISCONTINUED | COMMUNITY
Start: 2018-03-19 | End: 2019-01-15

## 2019-01-15 RX ORDER — ACLIDINIUM BROMIDE 400 UG/1
400 POWDER, METERED RESPIRATORY (INHALATION) TWICE DAILY
Qty: 3 | Refills: 1 | Status: DISCONTINUED | COMMUNITY
Start: 2018-07-19 | End: 2019-01-15

## 2019-01-17 NOTE — REASON FOR VISIT
[Subsequent Evaluation] : a subsequent evaluation for [FreeTextEntry2] : Routine f/u to check ears, c/o otorrhea

## 2019-01-17 NOTE — PROCEDURE
[Other ___] : [unfilled] [Same] : same as the Pre Op Dx. [] : Binocular Microscopy [FreeTextEntry1] : HANNAH rucker [FreeTextEntry4] : none [FreeTextEntry6] : Rigid endoscope with video feedback was used to examine the ear canal, ear drum and visible middle ear landmarks & educate patient. Adequate exam/patient education would not have been possible without the use of an endoscope. Findings are described. Stills taken.\par \par

## 2019-01-17 NOTE — PHYSICAL EXAM
[de-identified] : well healed TM, prosthesis medial to cartilage, no e/o cholesteatoma recurrence [Normal] : no rashes

## 2019-01-17 NOTE — HISTORY OF PRESENT ILLNESS
[de-identified] : R otorrhea x 2 nights\par no more otorrhea\par gradual hearing loss\par was told prosthesis extruded, here to check

## 2019-04-10 ENCOUNTER — RX RENEWAL (OUTPATIENT)
Age: 64
End: 2019-04-10

## 2019-05-01 ENCOUNTER — APPOINTMENT (OUTPATIENT)
Dept: PULMONOLOGY | Facility: CLINIC | Age: 64
End: 2019-05-01
Payer: MEDICARE

## 2019-05-01 ENCOUNTER — NON-APPOINTMENT (OUTPATIENT)
Age: 64
End: 2019-05-01

## 2019-05-01 VITALS
SYSTOLIC BLOOD PRESSURE: 115 MMHG | RESPIRATION RATE: 14 BRPM | BODY MASS INDEX: 25.98 KG/M2 | DIASTOLIC BLOOD PRESSURE: 70 MMHG | HEIGHT: 77 IN | HEART RATE: 88 BPM | WEIGHT: 220 LBS | OXYGEN SATURATION: 98 %

## 2019-05-01 PROCEDURE — 94010 BREATHING CAPACITY TEST: CPT

## 2019-05-01 PROCEDURE — 99214 OFFICE O/P EST MOD 30 MIN: CPT | Mod: 25

## 2019-05-01 NOTE — ADDENDUM
[FreeTextEntry1] : Documented by Robby Dumont acting as a scribe for Dr. Cliff Marvin on 05/01/2019 \par \par All medical record entries made by the Scribe were at my, Dr. Cliff Marvin's, direction and personally dictated by me on 05/01/2019  . I have reviewed the chart and agree that the record accurately reflects my personal performance of the history, physical exam, procedure, assessment and plan. I have also personally directed, reviewed, and agree with the discharge instructions. \par \par  \par

## 2019-05-01 NOTE — REVIEW OF SYSTEMS
[Negative] : Sleep Disorder [Sputum] : sputum  [Cough] : cough [Itchy Eyes] : itching of ~T the eyes [Dyspnea] : dyspnea [As Noted in HPI] : as noted in HPI [FreeTextEntry2] : hoarseness

## 2019-05-01 NOTE — HISTORY OF PRESENT ILLNESS
[FreeTextEntry1] : Mr. Caro is a 63 year old male presenting to the office for a follow up visit for abnormal PFTs, allergic rhinitis, adenocarcinoma of the lung, COPD, GERD, LILY and shortness of breath. His chief complaint is his cough\par -he reports he still feels "off"\par -he notes he occasionally will have chest pain and pressure\par -he states he has difficulty climbing up stairs\par -he reports he currently has been coughing with sputum\par -his wife states he has a cough to clear\par -he states he stays active with walking but does not formally exercise \par -he notes he has been hoarse recently\par -he reports his eyes are frequently itchy\par -he states he uses his Symbicort and Incruse regularly \par -he notes he went to pulmonary rehab recently \par -he states he is sleeping well \par -he denies any diarrhea, constipation, dysphagia, dizziness, sour taste in the mouth, heartburn, reflux, itchy ears, leg swelling, leg pain

## 2019-05-01 NOTE — PHYSICAL EXAM
[General Appearance - Well Developed] : well developed [Normal Appearance] : normal appearance [Well Groomed] : well groomed [No Deformities] : no deformities [General Appearance - Well Nourished] : well nourished [General Appearance - In No Acute Distress] : no acute distress [Normal Conjunctiva] : the conjunctiva exhibited no abnormalities [Eyelids - No Xanthelasma] : the eyelids demonstrated no xanthelasmas [Normal Oropharynx] : normal oropharynx [Neck Appearance] : the appearance of the neck was normal [Neck Cervical Mass (___cm)] : no neck mass was observed [Jugular Venous Distention Increased] : there was no jugular-venous distention [Thyroid Diffuse Enlargement] : the thyroid was not enlarged [Thyroid Nodule] : there were no palpable thyroid nodules [Heart Rate And Rhythm] : heart rate and rhythm were normal [Heart Sounds] : normal S1 and S2 [Murmurs] : no murmurs present [Respiration, Rhythm And Depth] : normal respiratory rhythm and effort [Exaggerated Use Of Accessory Muscles For Inspiration] : no accessory muscle use [Auscultation Breath Sounds / Voice Sounds] : lungs were clear to auscultation bilaterally [Abdomen Soft] : soft [Abdomen Tenderness] : non-tender [Abdomen Mass (___ Cm)] : no abdominal mass palpated [Abnormal Walk] : normal gait [Nail Clubbing] : no clubbing of the fingernails [Gait - Sufficient For Exercise Testing] : the gait was sufficient for exercise testing [Cyanosis, Localized] : no localized cyanosis [Petechial Hemorrhages (___cm)] : no petechial hemorrhages [Skin Color & Pigmentation] : normal skin color and pigmentation [] : no rash [No Venous Stasis] : no venous stasis [No Skin Ulcers] : no skin ulcer [Skin Lesions] : no skin lesions [No Xanthoma] : no  xanthoma was observed [Sensation] : the sensory exam was normal to light touch and pinprick [Deep Tendon Reflexes (DTR)] : deep tendon reflexes were 2+ and symmetric [Oriented To Time, Place, And Person] : oriented to person, place, and time [No Focal Deficits] : no focal deficits [Impaired Insight] : insight and judgment were intact [Affect] : the affect was normal [III] : III [FreeTextEntry1] : Umbilical hernia

## 2019-05-01 NOTE — PROCEDURE
[FreeTextEntry1] : PFT revealed moderate obstructive and moderate obstructive dysfunction, normal flows, with a FEV1 of  2.06 L, which is 47% of predicted, with a normal flow volume loop\par

## 2019-05-01 NOTE — ASSESSMENT
[FreeTextEntry1] : Mr. Caro has a history of COPD, multiple lung cancers, OSAS, allergy, and GERD. He is doing well from a pulmonary perspective. His number one issue are his allergies\par \par His shortness of breath is multifactorial due to\par - history of COPD from prior smoking\par - restrictive disfunction due to multiple lung surgeries \par - obesity/out of shape \par - vocal cords\par - poor mechanics of breathing\par \par Problem 1: COPD\par -nebulizer with albuterol up to QID\par - continue Symbicort 160 2 inhalations BID\par - continue Incruse 1 inhalation QD (after Symbicort )\par - follow up with pulmonary rehab\par \par -COPD is a progressive disease and although it can’t be cured , appropriate management can slow its progression, reduce frequency and severity of exacerbations, and improve symptoms and the patient quality of life. Hospitalizations are the greatest contributor to the total COPD costs and account for up to 87% of total COPD related costs. Exacerbations are the main cause of admissions and subsequently account for the 40-75% of COPD costs. Inhaled maintenance therapy reduces the incidence of exacerbations in patients with stable COPD. Incorrect inhaler use and nonadherence are major obstacles to achieving COPD treatment goals. Many COPD patients have challenges (impaired inhalation, limited dexterity, reduced cognition: that limit their ability to correctly use their COPD treatment devices resulting in reduced symptom control. Of most importance is smoking cessation and early intervention with respiratory illnesses and contemplation for pulmonary rehab to enhance quality of life. \par \par Problem 2: Obesity\par -Weight loss, exercise, and diet control were discussed and are highly encouraged. Treatment options were given such as, aqua therapy, and contacting a nutritionist. Recommended to use the elliptical, stationary bike, less use of treadmill. Obesity is associated with worsening asthma, shortness of breath, and potential for cardiac disease, diabetes, and other underlying medical conditions. \par \par Problem 3: allergies/sinuses\par - continue Flonase 1 spray each nostril BID\par -add Claritin 10 mg QAM\par - continue Xyzal 5 mg QHS\par \par -Environmental measures for allergies were encouraged including mattress and pillow cover, air purifier, and environmental controls. \par \par Problem 4: GERD\par - continue Omeprazole 40 mg BID (pre-breakfast/pre-dinner) \par -Rule of 2s: avoid eating too much, eating too late, eating too spicy, eating two hours before bed\par -Things to avoid including overeating, spicy foods, tight clothing, eating within three hours of bed, this list is not all inclusive. \par -For treatment of reflux, possible options discussed including diet control, H2 blockers, PPIs, as well as coating motility agents discussed as treatment options. Timing of meals and proximity of last meal to sleep were discussed. If symptoms persist, a formal gastrointestinal evaluation is needed. \par \par Problem 5: sleep apnea,\par - side sleeping (positional change)\par -has refused treatment \par \par -Sleep apnea is associated with adverse clinical consequences which an affect most organ systems. Cardiovascular disease risk includes arrhythmias, atrial fibrillation, hypertension, coronary artery disease, and stroke. Metabolic disorders include diabetes type 2, non-alcoholic fatty liver disease. Mood disorder especially depression; and cognitive decline especially in the elderly. Associations with chronic reflux/Zamudio’s esophagus some but not all inclusive. \par -Reasons to assess this include arousal consistent with hypopnea; respiratory events most prominent in REM sleep or supine position; therefore sleep staging and body position are important for accurate diagnosis and estimation of AHI. \par \par problem 6: lung cancer screening\par -recommended follow up chest CT yearly, next CT due in July 2019\par \par Lung cancer screening is recommended for people between the ages of 55 and 80 with prior 30+ pack year smoking histories. There is irrefutable evidence for realization of lung cancer screening based on two large randomized control trials demonstrating relative reduction in lung cancer mortality for patients undergoing low-dose CT scanning. Risks and benefits reviewed with the patient \par \par problem 7: health maintenance \par -recommended to go to pulmonary rehab\par -he is has Xanax 0.5 mg PRN for anxiety\par -received influenza vaccination 2018\par -recommended strep pneumonia vaccines: Prevnar-13 vaccine, followed by Pneumo vaccine 23 one year following\par -recommended early intervention for URIs\par -recommended regular osteoporosis evaluations\par -recommended early dermatological evaluations\par -recommended after the age of 50 to the age of 70, colonoscopy every 5 years \par  \par \par F/U in 4-6 months\par He is encouraged to call with any questions, concerns, and changes. \par

## 2019-07-10 ENCOUNTER — FORM ENCOUNTER (OUTPATIENT)
Age: 64
End: 2019-07-10

## 2019-07-11 ENCOUNTER — APPOINTMENT (OUTPATIENT)
Dept: CT IMAGING | Facility: CLINIC | Age: 64
End: 2019-07-11
Payer: MEDICARE

## 2019-07-11 ENCOUNTER — OUTPATIENT (OUTPATIENT)
Dept: OUTPATIENT SERVICES | Facility: HOSPITAL | Age: 64
LOS: 1 days | End: 2019-07-11
Payer: MEDICARE

## 2019-07-11 ENCOUNTER — APPOINTMENT (OUTPATIENT)
Dept: ULTRASOUND IMAGING | Facility: CLINIC | Age: 64
End: 2019-07-11
Payer: MEDICARE

## 2019-07-11 DIAGNOSIS — C34.92 MALIGNANT NEOPLASM OF UNSPECIFIED PART OF LEFT BRONCHUS OR LUNG: Chronic | ICD-10-CM

## 2019-07-11 DIAGNOSIS — Z98.890 OTHER SPECIFIED POSTPROCEDURAL STATES: Chronic | ICD-10-CM

## 2019-07-11 DIAGNOSIS — E89.0 POSTPROCEDURAL HYPOTHYROIDISM: Chronic | ICD-10-CM

## 2019-07-11 DIAGNOSIS — C34.92 MALIGNANT NEOPLASM OF UNSPECIFIED PART OF LEFT BRONCHUS OR LUNG: ICD-10-CM

## 2019-07-11 PROCEDURE — 71250 CT THORAX DX C-: CPT | Mod: 26

## 2019-07-11 PROCEDURE — 76536 US EXAM OF HEAD AND NECK: CPT | Mod: 26

## 2019-07-11 PROCEDURE — 71250 CT THORAX DX C-: CPT

## 2019-07-11 PROCEDURE — 76536 US EXAM OF HEAD AND NECK: CPT

## 2019-07-15 ENCOUNTER — TRANSCRIPTION ENCOUNTER (OUTPATIENT)
Age: 64
End: 2019-07-15

## 2019-09-02 PROBLEM — Z09 FOLLOW UP: Status: ACTIVE | Noted: 2017-10-17

## 2019-09-10 ENCOUNTER — APPOINTMENT (OUTPATIENT)
Dept: PULMONOLOGY | Facility: CLINIC | Age: 64
End: 2019-09-10
Payer: MEDICARE

## 2019-09-10 VITALS
BODY MASS INDEX: 26.79 KG/M2 | SYSTOLIC BLOOD PRESSURE: 113 MMHG | DIASTOLIC BLOOD PRESSURE: 70 MMHG | RESPIRATION RATE: 18 BRPM | HEART RATE: 93 BPM | WEIGHT: 220 LBS | OXYGEN SATURATION: 99 % | HEIGHT: 76 IN

## 2019-09-10 PROCEDURE — 94729 DIFFUSING CAPACITY: CPT

## 2019-09-10 PROCEDURE — 99214 OFFICE O/P EST MOD 30 MIN: CPT | Mod: 25

## 2019-09-10 PROCEDURE — ZZZZZ: CPT

## 2019-09-10 PROCEDURE — 94060 EVALUATION OF WHEEZING: CPT

## 2019-09-10 NOTE — HISTORY OF PRESENT ILLNESS
[FreeTextEntry1] : Mr. Caro is a 63 year old male presenting to the office for a follow up visit for abnormal PFTs, allergic rhinitis, adenocarcinoma of the lung, COPD, GERD, LILY and shortness of breath. His chief complaint is SOB, fatigue, chest pain. \par -he reports having a cold / sore throat a few weeks ago. he has been feeling better\par -he reports that he has been having some pain in the middle of his chest\par -he reports that he has been getting SOB and fatigued quickly\par -pt is not taking an new medications.\par -CT scan scheduled for Jan 2020\par -he denies any headaches, nausea, vomiting, fever, chills, sweats, chest pressure, diarrhea, constipation, dysphagia, dizziness, leg swelling, leg pain, itchy eyes, itchy ears, heartburn, reflux, or sour taste in the mouth.

## 2019-09-10 NOTE — ADDENDUM
[FreeTextEntry1] : All medical record entries made by poncho Tan were at Dr. Cliff Marvin's, direction and personally dictated by me on 09/10/2019. I have reviewed the chart and agree that the record accurately reflects my personal performance of the history, physical exam, assessment and plan. I have also personally directed, reviewed, and agree with the discharge instructions. \par  \par

## 2019-09-10 NOTE — REVIEW OF SYSTEMS
[Cough] : cough [Sputum] : sputum  [Dyspnea] : dyspnea [As Noted in HPI] : as noted in HPI [Itchy Eyes] : itching of ~T the eyes [Negative] : Sleep Disorder [FreeTextEntry2] : hoarseness

## 2019-09-10 NOTE — PHYSICAL EXAM
[General Appearance - Well Developed] : well developed [Normal Appearance] : normal appearance [Well Groomed] : well groomed [General Appearance - Well Nourished] : well nourished [No Deformities] : no deformities [General Appearance - In No Acute Distress] : no acute distress [Normal Conjunctiva] : the conjunctiva exhibited no abnormalities [Eyelids - No Xanthelasma] : the eyelids demonstrated no xanthelasmas [Normal Oropharynx] : normal oropharynx [Neck Appearance] : the appearance of the neck was normal [Neck Cervical Mass (___cm)] : no neck mass was observed [Jugular Venous Distention Increased] : there was no jugular-venous distention [Thyroid Diffuse Enlargement] : the thyroid was not enlarged [Thyroid Nodule] : there were no palpable thyroid nodules [Heart Rate And Rhythm] : heart rate and rhythm were normal [Heart Sounds] : normal S1 and S2 [Murmurs] : no murmurs present [Respiration, Rhythm And Depth] : normal respiratory rhythm and effort [Exaggerated Use Of Accessory Muscles For Inspiration] : no accessory muscle use [Auscultation Breath Sounds / Voice Sounds] : lungs were clear to auscultation bilaterally [Abdomen Soft] : soft [Abdomen Tenderness] : non-tender [Abdomen Mass (___ Cm)] : no abdominal mass palpated [Abnormal Walk] : normal gait [Gait - Sufficient For Exercise Testing] : the gait was sufficient for exercise testing [Nail Clubbing] : no clubbing of the fingernails [Cyanosis, Localized] : no localized cyanosis [Petechial Hemorrhages (___cm)] : no petechial hemorrhages [Skin Color & Pigmentation] : normal skin color and pigmentation [] : no rash [No Venous Stasis] : no venous stasis [Skin Lesions] : no skin lesions [No Skin Ulcers] : no skin ulcer [No Xanthoma] : no  xanthoma was observed [Deep Tendon Reflexes (DTR)] : deep tendon reflexes were 2+ and symmetric [Sensation] : the sensory exam was normal to light touch and pinprick [No Focal Deficits] : no focal deficits [Impaired Insight] : insight and judgment were intact [Oriented To Time, Place, And Person] : oriented to person, place, and time [Affect] : the affect was normal [II] : II [FreeTextEntry1] : I:E ratio 1:3; clear

## 2019-09-10 NOTE — ASSESSMENT
[FreeTextEntry1] : Mr. Caro has a history of BPH, hypothyroid, COPD, multiple lung cancers, OSAS, allergy, and GERD. He is doing well from a pulmonary perspective. His number one issue are his allergies\par \par His shortness of breath is multifactorial due to\par - history of COPD from prior smoking\par - restrictive disfunction due to multiple lung surgeries \par - obesity/out of shape \par - vocal cords\par - poor mechanics of breathing\par \par Problem 1: COPD\par -nebulizer with albuterol up to QID\par - continue Symbicort 160 2 inhalations BID\par - continue Incruse 1 inhalation QD (after Symbicort )\par - follow up with pulmonary rehab\par \par -COPD is a progressive disease and although it can’t be cured , appropriate management can slow its progression, reduce frequency and severity of exacerbations, and improve symptoms and the patient quality of life. Hospitalizations are the greatest contributor to the total COPD costs and account for up to 87% of total COPD related costs. Exacerbations are the main cause of admissions and subsequently account for the 40-75% of COPD costs. Inhaled maintenance therapy reduces the incidence of exacerbations in patients with stable COPD. Incorrect inhaler use and nonadherence are major obstacles to achieving COPD treatment goals. Many COPD patients have challenges (impaired inhalation, limited dexterity, reduced cognition: that limit their ability to correctly use their COPD treatment devices resulting in reduced symptom control. Of most importance is smoking cessation and early intervention with respiratory illnesses and contemplation for pulmonary rehab to enhance quality of life. \par \par Problem 2: Obesity\par -Weight loss, exercise, and diet control were discussed and are highly encouraged. Treatment options were given such as, aqua therapy, and contacting a nutritionist. Recommended to use the elliptical, stationary bike, less use of treadmill. Obesity is associated with worsening asthma, shortness of breath, and potential for cardiac disease, diabetes, and other underlying medical conditions. \par \par Problem 3: allergies/sinuses\par - continue Flonase 1 spray each nostril BID\par -add Claritin 10 mg QAM\par - continue Xyzal 5 mg QHS\par \par -Environmental measures for allergies were encouraged including mattress and pillow cover, air purifier, and environmental controls. \par \par Problem 4: GERD\par - continue Omeprazole 40 mg BID (pre-breakfast/pre-dinner) \par -Rule of 2s: avoid eating too much, eating too late, eating too spicy, eating two hours before bed\par -Things to avoid including overeating, spicy foods, tight clothing, eating within three hours of bed, this list is not all inclusive. \par -For treatment of reflux, possible options discussed including diet control, H2 blockers, PPIs, as well as coating motility agents discussed as treatment options. Timing of meals and proximity of last meal to sleep were discussed. If symptoms persist, a formal gastrointestinal evaluation is needed. \par \par Problem 5: sleep apnea,\par - side sleeping (positional change)\par -has refused treatment ; chinstrap\par \par -Sleep apnea is associated with adverse clinical consequences which an affect most organ systems. Cardiovascular disease risk includes arrhythmias, atrial fibrillation, hypertension, coronary artery disease, and stroke. Metabolic disorders include diabetes type 2, non-alcoholic fatty liver disease. Mood disorder especially depression; and cognitive decline especially in the elderly. Associations with chronic reflux/Zamudio’s esophagus some but not all inclusive. \par -Reasons to assess this include arousal consistent with hypopnea; respiratory events most prominent in REM sleep or supine position; therefore sleep staging and body position are important for accurate diagnosis and estimation of AHI. \par \par problem 6: lung cancer screening\par -recommended follow up chest CT yearly, next CT due in Jan 2020\par \par Lung cancer screening is recommended for people between the ages of 55 and 80 with prior 30+ pack year smoking histories. There is irrefutable evidence for realization of lung cancer screening based on two large randomized control trials demonstrating relative reduction in lung cancer mortality for patients undergoing low-dose CT scanning. Risks and benefits reviewed with the patient \par \par problem 7: health maintenance \par -recommended to go to pulmonary rehab\par -he is has Xanax 0.5 mg PRN for anxiety\par -received influenza vaccination 2018\par -recommended strep pneumonia vaccines: Prevnar-13 vaccine, followed by Pneumo vaccine 23 one year following\par -recommended early intervention for URIs\par -recommended regular osteoporosis evaluations\par -recommended early dermatological evaluations\par -recommended after the age of 50 to the age of 70, colonoscopy every 5 years \par  \par \par F/U in 4-6 months\par He is encouraged to call with any questions, concerns, and changes. \par

## 2019-09-10 NOTE — PROCEDURE
[FreeTextEntry1] : PFT - spi reveals moderate restrictive / obstructive ; FEV1 is 1.84 which is 42% of predicted, normal flow volume loop, 5% change with BD. Normal Diffusion, DLCO is 18.9 which is 70% of predicted. \par \par Chest CT (July 11, 2019) reveals no change from the prior study.

## 2019-09-30 ENCOUNTER — TRANSCRIPTION ENCOUNTER (OUTPATIENT)
Age: 64
End: 2019-09-30

## 2019-11-01 ENCOUNTER — MEDICATION RENEWAL (OUTPATIENT)
Age: 64
End: 2019-11-01

## 2019-12-14 ENCOUNTER — EMERGENCY (EMERGENCY)
Facility: HOSPITAL | Age: 64
LOS: 1 days | Discharge: HOME | End: 2019-12-14
Attending: EMERGENCY MEDICINE
Payer: MEDICARE

## 2019-12-14 VITALS
HEIGHT: 76 IN | SYSTOLIC BLOOD PRESSURE: 105 MMHG | RESPIRATION RATE: 20 BRPM | DIASTOLIC BLOOD PRESSURE: 68 MMHG | WEIGHT: 214.95 LBS | TEMPERATURE: 98 F | OXYGEN SATURATION: 98 % | HEART RATE: 109 BPM

## 2019-12-14 VITALS
HEART RATE: 86 BPM | SYSTOLIC BLOOD PRESSURE: 113 MMHG | RESPIRATION RATE: 17 BRPM | DIASTOLIC BLOOD PRESSURE: 75 MMHG | OXYGEN SATURATION: 98 %

## 2019-12-14 VITALS
HEART RATE: 91 BPM | OXYGEN SATURATION: 97 % | SYSTOLIC BLOOD PRESSURE: 115 MMHG | TEMPERATURE: 98 F | DIASTOLIC BLOOD PRESSURE: 82 MMHG | RESPIRATION RATE: 16 BRPM

## 2019-12-14 VITALS
HEIGHT: 76 IN | OXYGEN SATURATION: 99 % | HEART RATE: 97 BPM | SYSTOLIC BLOOD PRESSURE: 146 MMHG | RESPIRATION RATE: 18 BRPM | WEIGHT: 214.95 LBS | DIASTOLIC BLOOD PRESSURE: 75 MMHG | TEMPERATURE: 98 F

## 2019-12-14 DIAGNOSIS — E89.0 POSTPROCEDURAL HYPOTHYROIDISM: Chronic | ICD-10-CM

## 2019-12-14 DIAGNOSIS — Z98.890 OTHER SPECIFIED POSTPROCEDURAL STATES: Chronic | ICD-10-CM

## 2019-12-14 DIAGNOSIS — C34.92 MALIGNANT NEOPLASM OF UNSPECIFIED PART OF LEFT BRONCHUS OR LUNG: Chronic | ICD-10-CM

## 2019-12-14 LAB
ALBUMIN SERPL ELPH-MCNC: 4.4 G/DL — SIGNIFICANT CHANGE UP (ref 3.3–5)
ALP SERPL-CCNC: 70 U/L — SIGNIFICANT CHANGE UP (ref 40–120)
ALT FLD-CCNC: 13 U/L — SIGNIFICANT CHANGE UP (ref 10–45)
ANION GAP SERPL CALC-SCNC: 13 MMOL/L — SIGNIFICANT CHANGE UP (ref 5–17)
APPEARANCE UR: CLEAR — SIGNIFICANT CHANGE UP
AST SERPL-CCNC: 15 U/L — SIGNIFICANT CHANGE UP (ref 10–40)
BACTERIA # UR AUTO: NEGATIVE — SIGNIFICANT CHANGE UP
BASOPHILS # BLD AUTO: 0.02 K/UL — SIGNIFICANT CHANGE UP (ref 0–0.2)
BASOPHILS NFR BLD AUTO: 0.3 % — SIGNIFICANT CHANGE UP (ref 0–2)
BILIRUB SERPL-MCNC: 1.1 MG/DL — SIGNIFICANT CHANGE UP (ref 0.2–1.2)
BILIRUB UR-MCNC: NEGATIVE — SIGNIFICANT CHANGE UP
BUN SERPL-MCNC: 14 MG/DL — SIGNIFICANT CHANGE UP (ref 7–23)
CALCIUM SERPL-MCNC: 9.4 MG/DL — SIGNIFICANT CHANGE UP (ref 8.4–10.5)
CHLORIDE SERPL-SCNC: 101 MMOL/L — SIGNIFICANT CHANGE UP (ref 96–108)
CO2 SERPL-SCNC: 25 MMOL/L — SIGNIFICANT CHANGE UP (ref 22–31)
COLOR SPEC: YELLOW — SIGNIFICANT CHANGE UP
CREAT SERPL-MCNC: 0.9 MG/DL — SIGNIFICANT CHANGE UP (ref 0.5–1.3)
DIFF PNL FLD: ABNORMAL
EOSINOPHIL # BLD AUTO: 0.05 K/UL — SIGNIFICANT CHANGE UP (ref 0–0.5)
EOSINOPHIL NFR BLD AUTO: 0.7 % — SIGNIFICANT CHANGE UP (ref 0–6)
EPI CELLS # UR: 0 /HPF — SIGNIFICANT CHANGE UP
GLUCOSE SERPL-MCNC: 107 MG/DL — HIGH (ref 70–99)
GLUCOSE UR QL: NEGATIVE — SIGNIFICANT CHANGE UP
HCT VFR BLD CALC: 42.3 % — SIGNIFICANT CHANGE UP (ref 39–50)
HGB BLD-MCNC: 13.8 G/DL — SIGNIFICANT CHANGE UP (ref 13–17)
HYALINE CASTS # UR AUTO: 0 /LPF — SIGNIFICANT CHANGE UP (ref 0–2)
IMM GRANULOCYTES NFR BLD AUTO: 0.5 % — SIGNIFICANT CHANGE UP (ref 0–1.5)
KETONES UR-MCNC: NEGATIVE — SIGNIFICANT CHANGE UP
LEUKOCYTE ESTERASE UR-ACNC: NEGATIVE — SIGNIFICANT CHANGE UP
LYMPHOCYTES # BLD AUTO: 0.52 K/UL — LOW (ref 1–3.3)
LYMPHOCYTES # BLD AUTO: 7.1 % — LOW (ref 13–44)
MCHC RBC-ENTMCNC: 28.1 PG — SIGNIFICANT CHANGE UP (ref 27–34)
MCHC RBC-ENTMCNC: 32.6 GM/DL — SIGNIFICANT CHANGE UP (ref 32–36)
MCV RBC AUTO: 86.2 FL — SIGNIFICANT CHANGE UP (ref 80–100)
MONOCYTES # BLD AUTO: 0.37 K/UL — SIGNIFICANT CHANGE UP (ref 0–0.9)
MONOCYTES NFR BLD AUTO: 5 % — SIGNIFICANT CHANGE UP (ref 2–14)
NEUTROPHILS # BLD AUTO: 6.36 K/UL — SIGNIFICANT CHANGE UP (ref 1.8–7.4)
NEUTROPHILS NFR BLD AUTO: 86.4 % — HIGH (ref 43–77)
NITRITE UR-MCNC: NEGATIVE — SIGNIFICANT CHANGE UP
NRBC # BLD: 0 /100 WBCS — SIGNIFICANT CHANGE UP (ref 0–0)
PH UR: 7 — SIGNIFICANT CHANGE UP (ref 5–8)
PLATELET # BLD AUTO: 270 K/UL — SIGNIFICANT CHANGE UP (ref 150–400)
POTASSIUM SERPL-MCNC: 4.1 MMOL/L — SIGNIFICANT CHANGE UP (ref 3.5–5.3)
POTASSIUM SERPL-SCNC: 4.1 MMOL/L — SIGNIFICANT CHANGE UP (ref 3.5–5.3)
PROT SERPL-MCNC: 7.3 G/DL — SIGNIFICANT CHANGE UP (ref 6–8.3)
PROT UR-MCNC: ABNORMAL
RBC # BLD: 4.91 M/UL — SIGNIFICANT CHANGE UP (ref 4.2–5.8)
RBC # FLD: 12.6 % — SIGNIFICANT CHANGE UP (ref 10.3–14.5)
RBC CASTS # UR COMP ASSIST: 15 /HPF — HIGH (ref 0–4)
SODIUM SERPL-SCNC: 139 MMOL/L — SIGNIFICANT CHANGE UP (ref 135–145)
SP GR SPEC: 1.02 — SIGNIFICANT CHANGE UP (ref 1.01–1.02)
UROBILINOGEN FLD QL: ABNORMAL
WBC # BLD: 7.36 K/UL — SIGNIFICANT CHANGE UP (ref 3.8–10.5)
WBC # FLD AUTO: 7.36 K/UL — SIGNIFICANT CHANGE UP (ref 3.8–10.5)
WBC UR QL: 2 /HPF — SIGNIFICANT CHANGE UP (ref 0–5)

## 2019-12-14 PROCEDURE — 99283 EMERGENCY DEPT VISIT LOW MDM: CPT

## 2019-12-14 PROCEDURE — 99283 EMERGENCY DEPT VISIT LOW MDM: CPT | Mod: GC

## 2019-12-14 PROCEDURE — 87086 URINE CULTURE/COLONY COUNT: CPT

## 2019-12-14 PROCEDURE — 99283 EMERGENCY DEPT VISIT LOW MDM: CPT | Mod: 25

## 2019-12-14 PROCEDURE — 51702 INSERT TEMP BLADDER CATH: CPT

## 2019-12-14 PROCEDURE — 99284 EMERGENCY DEPT VISIT MOD MDM: CPT

## 2019-12-14 PROCEDURE — 85027 COMPLETE CBC AUTOMATED: CPT

## 2019-12-14 PROCEDURE — 80053 COMPREHEN METABOLIC PANEL: CPT

## 2019-12-14 PROCEDURE — 81001 URINALYSIS AUTO W/SCOPE: CPT

## 2019-12-14 RX ORDER — ATROPA BELLADONNA AND OPIUM 16.2; 6 MG/1; MG/1
1 SUPPOSITORY RECTAL ONCE
Refills: 0 | Status: DISCONTINUED | OUTPATIENT
Start: 2019-12-14 | End: 2019-12-14

## 2019-12-14 RX ORDER — DIAZEPAM 5 MG
5 TABLET ORAL ONCE
Refills: 0 | Status: DISCONTINUED | OUTPATIENT
Start: 2019-12-14 | End: 2019-12-14

## 2019-12-14 RX ADMIN — ATROPA BELLADONNA AND OPIUM 1 SUPPOSITORY(S): 16.2; 6 SUPPOSITORY RECTAL at 23:45

## 2019-12-14 RX ADMIN — Medication 5 MILLIGRAM(S): at 22:36

## 2019-12-14 NOTE — ED PROVIDER NOTE - NS ED ROS FT
Review of Systems    Constitutional: (-) fever, (-) chills, (-) fatigue  HEENT: (-) sore throat, (-) hearing loss, (-) nasal congestion  Cardiovascular: (-) chest pain, (-) syncope  Respiratory: (-) cough, (-) shortness of breath  Gastrointestinal: (-) vomiting, (-) diarrhea, (-) abdominal pain  Genitourinary: (+) urinary retention, (+) hematuria  Musculoskeletal: (-) neck pain, (-) back pain, (-) joint pain  Integumentary: (-) rash, (-) edema, (-) wound  Neurological: (-) headache, (-) altered mental status    Except as documented in the HPI, all other systems are negative.

## 2019-12-14 NOTE — ED PROVIDER NOTE - PROGRESS NOTE DETAILS
Malini-PGY1: spoke to Dr. Gary Goldberg (urology).  Discussed patient presentation, lab results, and exam findings.  I was advised to administer low dose valium PO for bladder spasm and to irrigate bloom site with sterile saline.  He advised me to have patient follow up in his office on Monday 12/16/19.  Discussed plan with patient and family, patient understands plan and is agreeable. Malini-PGY1: discussed drainage around Gaitan with urology PA. Advised to use belladonna-opium suppository and reassess. Malini-PGY1: pt seen and re-evaluated at bedside. No drainage from around catheter site.  Pt comfortable in NAD.  Will prepare for DC with urology follow up as scheduled.

## 2019-12-14 NOTE — ED PROVIDER NOTE - OBJECTIVE STATEMENT
64 year old male PMHx lung cancer s/p  b/l lobectomies (chemo/radiation 2010), COPD (mild), LILY non-compliant with CPAP, BPH s/p TURP,  thyroid cancer, underwent  total thyroidectomy (4/2016/radiation), GERD, pisano esophagus presents with leakage from Bloom catheter site.  Pt was seen in [facility] ED 12 hours ago for urinary retention, had 16 Solomon Islander bloom catheter placed and was found to have leakage around catheter site. 16 Solomon Islander was removed and 18 Solomon Islander was inserted with good urine output.  Pt had labs and urinalysis this morning with no signs of infection, anemia, or renal dysfunction.  Pt presents with leakage from catheter site x 2 hours with gross hematuria in leg bag.  Pt also reports bladder "spasms" similar to past episodes, states it was previously relieved with PO valium.  Pt follows Dr. Gary Goldberg ([phone]) for urology, has appointment in 2 days.  Denies any additional complaints.

## 2019-12-14 NOTE — ED PROVIDER NOTE - CLINICAL SUMMARY MEDICAL DECISION MAKING FREE TEXT BOX
Malini-PGY1: 64 year old male PMHx lung cancer s/p  b/l lobectomies (chemo/radiation 2010), COPD (mild), LILY non-compliant with CPAP, BPH s/p TURP,  thyroid cancer, underwent  total thyroidectomy (4/2016/radiation), GERD, pisano esophagus presents with leakage from Gaitan catheter site.  Spoke to Dr. Goldberg (urology), states likely secondary to bladder spasm, recommending valium, flushing catheter and reassessing.  Will obtain urology consult if no improvement or additional symptoms.

## 2019-12-14 NOTE — ED ADULT NURSE NOTE - OBJECTIVE STATEMENT
Pt is a 65 yo M who came to the Ed amb c/o urinary retention. States he last voided at 11pm last night. A/O x3, abd distended, soft, c/o low abd discomfort, no hematuria. Gaitan inserted using sterile equipment and aseptic technique, 800 cc clear yellow urine output, 2 RN's in attendance, pt tolerated procedure well. Pt is a 65 yo M who came to the Ed amb c/o urinary retention. States he last voided at 11pm last night. A/O x3, abd distended, soft, c/o low abd discomfort, no hematuria. Gaitan inserted, size 16F Coude, using sterile equipment and aseptic technique, 800 cc clear yellow urine output, 2 RN's in attendance, pt tolerated procedure well.

## 2019-12-14 NOTE — ED PROVIDER NOTE - ATTENDING CONTRIBUTION TO CARE
I have seen and evaluated this patient with the resident.   I agree with the findings  unless other wise stated.  I have made appropriate changes in documentations where needed, After my face to face bedside evaluation, I am further  noting:  info from pt and spouse    64 year old male PMHx lung cancer s/p  b/l lobectomies (chemo/radiation 2010), COPD (mild), LLIY non-compliant with CPAP, BPH s/p TURP,  thyroid cancer, underwent  total thyroidectomy (4/2016/radiation), GERD, pisano esophagus presents with leakage from Bloom catheter site.  Pt was seen in [facility] ED 12 hours ago for urinary retention, had 16 Dutch bloom catheter placed and was found to have leakage around catheter site. 16 Dutch was removed and 18 Dutch was inserted with good urine output.  Pt had labs and urinalysis this morning with no signs of infection, anemia, or renal dysfunction.  Pt presents with leakage from catheter site x 2 hours with gross hematuria in leg bag.  Pt also reports bladder "spasms" similar to past episodes, states it was previously relieved with PO valium.  Pt follows Dr. Gary Goldberg ([phone]) for urology, has appointment in 2 days.  Denies any additional complaints.  Repeat visit for leakinf of urine around bloom catheter placed for urinary retention 2/2 BPH see detailed PE concern for bladder spasms and bloom  will try spasmolytics sedation doi francois --Kevyn

## 2019-12-14 NOTE — ED PROVIDER NOTE - PROGRESS NOTE DETAILS
RIO Rojas: IRISH Garza placed 16 F caudette with 800cc urinary output. will obtain labs and urine studies likely discharge home

## 2019-12-14 NOTE — ED ADULT NURSE NOTE - NSIMPLEMENTINTERV_GEN_ALL_ED
Implemented All Universal Safety Interventions:  Peculiar to call system. Call bell, personal items and telephone within reach. Instruct patient to call for assistance. Room bathroom lighting operational. Non-slip footwear when patient is off stretcher. Physically safe environment: no spills, clutter or unnecessary equipment. Stretcher in lowest position, wheels locked, appropriate side rails in place.

## 2019-12-14 NOTE — ED ADULT NURSE REASSESSMENT NOTE - NS ED NURSE REASSESS COMMENT FT1
Urine leaking around bloom. Catheter changed to size 18F Coude with good results, no further leaking noted. Sterile equipment and aseptic technique used, 2 RN's in attendance. D/C with written instructions, pt verbalizes understanding of catheter/leg bag care.

## 2019-12-14 NOTE — ED PROVIDER NOTE - PATIENT PORTAL LINK FT
You can access the FollowMyHealth Patient Portal offered by [facility] by registering at the following website: [email or web address]. By joining [employer]’s FollowMyHealth portal, you will also be able to view your health information using other applications (apps) compatible with our system.

## 2019-12-14 NOTE — ED ADULT NURSE REASSESSMENT NOTE - NS ED NURSE REASSESS COMMENT FT1
pts bloom flushed with approx 70ml. urine draining clear when clamp disconcerted into leg pain and around tip of penis around the bloom.

## 2019-12-14 NOTE — ED PROVIDER NOTE - CLINICAL SUMMARY MEDICAL DECISION MAKING FREE TEXT BOX
65 y/o male PMHx BPH s/p TURP f/u Dr. Gary Goldberg p/w urinary retention. Experienced symptoms in the past resolved with bloom catheter. No fever or chills. Bloom catheter drained 800cc of urine. will obtain renal fxn test and UA. Will recommend outpatient f/u with Dr. Goldberg this week ~ZR

## 2019-12-14 NOTE — ED PROVIDER NOTE - NSFOLLOWUPINSTRUCTIONS_ED_ALL_ED_FT
Take prescription diazepam (Valium) up to 3 times day by mouth as needed for bladder spasm.    Use prescription belladonna-opium up to 2 times day via rectal suppository as needed for bladder spasm.    Follow up with Dr. Goldberg on Monday as scheduled.    Return immediately with any new or worsening symptoms, including fever, abdominal pain, decreased output from Gaitan catheter, vomiting, chest pain, shortness of breath, or any additional concerns.

## 2019-12-14 NOTE — ED PROVIDER NOTE - NSFOLLOWUPINSTRUCTIONS_ED_ALL_ED_FT
Follow up with your Primary Care Physician within the next 2-3 days  Bring a copy of your test results with you to your appointment  Continue your current medication regimen  Return to the Emergency Room if you experience new or worsening symptoms abdominal pain, nausea, vomiting, back pain, chest pain, shortness of breath   Keep bloom in place until evaluated by your urologist this week

## 2019-12-14 NOTE — ED PROVIDER NOTE - OBJECTIVE STATEMENT
64 year old male PMHx lung cancer s/p  b/l lobectomies (chemo/radiation 2010), COPD (mild), LILY non-compliant with CPAP, BPH s/p TURP,  thyroid cancer, underwent  total thyroidectomy (4/2016/radiation), GERD, pisano esophagus, presented to the ED with unable to void in the last 12 hours. Patient noted he had urinary hesitancy, urinary freq, pressure, abdominal distension and lower abdominal pain. Patient concerned he was in retention prompting eval from ED. Patient had a bloom one month ago for urinary retention. Patient urologist Dr. Gary Goldberg with planned appointment this week, Patient denied hematuria, dysuria, N/V/D, back pain, dizziness, fver chills, headache, CP, SOB

## 2019-12-14 NOTE — ED ADULT NURSE REASSESSMENT NOTE - NS ED NURSE REASSESS COMMENT FT1
pt given suppository med. yellow urine draining into leg bag. sheet on bd changed due to previous leakage

## 2019-12-14 NOTE — ED ADULT NURSE NOTE - OBJECTIVE STATEMENT
Pt seen here and d/c this afternoon with urinary retention and bloom placement.  Pt had standard 16 fr bloom placed but was leaking urine around the catheter, pt had had previous foleys of same size without this issue.  Bloom was replaced by ED staff with 18 fr coude cath and d/c home with leg bag.  Pt now returns with continued leaking around catheter and hematuria.  Bloom draining without issue, c/o "bladder spasm" sensation throughout the day, no pain right now just discomfort.  Red tinged urine in bag, no clots.

## 2019-12-14 NOTE — ED PROVIDER NOTE - PATIENT PORTAL LINK FT
You can access the FollowMyHealth Patient Portal offered by [facility] by registering at the following website: [email or web address]. By joining [facility]’s FollowMyHealth portal, you will also be able to view your health information using other applications (apps) compatible with our system.

## 2019-12-15 LAB
CULTURE RESULTS: NO GROWTH — SIGNIFICANT CHANGE UP
SPECIMEN SOURCE: SIGNIFICANT CHANGE UP

## 2019-12-15 RX ORDER — ATROPA BELLADONNA AND OPIUM 16.2; 6 MG/1; MG/1
1 SUPPOSITORY RECTAL
Qty: 6 | Refills: 0
Start: 2019-12-15

## 2019-12-15 RX ORDER — DIAZEPAM 5 MG
1 TABLET ORAL
Qty: 9 | Refills: 0
Start: 2019-12-15

## 2019-12-19 ENCOUNTER — FORM ENCOUNTER (OUTPATIENT)
Age: 64
End: 2019-12-19

## 2019-12-19 ENCOUNTER — APPOINTMENT (OUTPATIENT)
Dept: INTERVENTIONAL RADIOLOGY/VASCULAR | Facility: CLINIC | Age: 64
End: 2019-12-19
Payer: MEDICARE

## 2019-12-19 VITALS
SYSTOLIC BLOOD PRESSURE: 111 MMHG | OXYGEN SATURATION: 99 % | DIASTOLIC BLOOD PRESSURE: 73 MMHG | TEMPERATURE: 97.6 F | RESPIRATION RATE: 18 BRPM | HEART RATE: 94 BPM

## 2019-12-19 DIAGNOSIS — A41.9 SEPSIS, UNSPECIFIED ORGANISM: ICD-10-CM

## 2019-12-19 DIAGNOSIS — Z87.891 PERSONAL HISTORY OF NICOTINE DEPENDENCE: ICD-10-CM

## 2019-12-19 DIAGNOSIS — N39.0 SEPSIS, UNSPECIFIED ORGANISM: ICD-10-CM

## 2019-12-19 PROCEDURE — 99205 OFFICE O/P NEW HI 60 MIN: CPT

## 2019-12-19 RX ORDER — DIAZEPAM 5 MG/1
5 TABLET ORAL
Qty: 60 | Refills: 0 | Status: DISCONTINUED | COMMUNITY
Start: 2017-11-27 | End: 2019-12-19

## 2019-12-19 RX ORDER — BUDESONIDE AND FORMOTEROL FUMARATE DIHYDRATE 160; 4.5 UG/1; UG/1
160-4.5 AEROSOL RESPIRATORY (INHALATION) TWICE DAILY
Qty: 3 | Refills: 1 | Status: DISCONTINUED | COMMUNITY
Start: 2017-08-29 | End: 2019-12-19

## 2019-12-19 RX ORDER — BUDESONIDE AND FORMOTEROL FUMARATE DIHYDRATE 160; 4.5 UG/1; UG/1
160-4.5 AEROSOL RESPIRATORY (INHALATION) TWICE DAILY
Qty: 3 | Refills: 2 | Status: DISCONTINUED | COMMUNITY
Start: 2018-07-19 | End: 2019-12-19

## 2019-12-19 RX ORDER — DOXAZOSIN 8 MG/1
8 TABLET ORAL
Refills: 0 | Status: DISCONTINUED | COMMUNITY
End: 2019-12-19

## 2019-12-19 RX ORDER — ALBUTEROL SULFATE 2.5 MG/3ML
(2.5 MG/3ML) SOLUTION RESPIRATORY (INHALATION)
Qty: 120 | Refills: 5 | Status: DISCONTINUED | OUTPATIENT
Start: 2018-03-19 | End: 2019-12-19

## 2019-12-19 RX ORDER — TAMSULOSIN HYDROCHLORIDE 0.4 MG/1
0.4 CAPSULE ORAL TWICE DAILY
Refills: 0 | Status: ACTIVE | COMMUNITY
Start: 2019-12-19

## 2019-12-19 NOTE — PHYSICAL EXAM
[Alert] : alert [Well Nourished] : well nourished [No Acute Distress] : no acute distress [Normal Sclera/Conjunctiva] : normal sclera/conjunctiva [Well Developed] : well developed [Normal Voice/Communication] : normal voice communication [Healthy Appearance] : healthy appearance [No Respiratory Distress] : no respiratory distress [Normal Rate and Effort] : normal respiratory rhythm and effort [Normal Gait] : normal gait [No Accessory Muscle Use] : no accessory muscle use [Oriented x3] : oriented to person, place, and time [Normal Insight/Judgement] : insight and judgment were intact [Normal Affect] : the affect was normal [Normal Mood] : the mood was normal [Remote Memory Normal] : remote memory was not impaired [Recent Memory Normal] : recent memory was not impaired [Restricted in physically strenuous activity but ambulatory and able to carry out work of a light or sedentary nature] : Restricted in physically strenuous activity but ambulatory and able to carry out work of a light or sedentary nature, e.g., light house work, office work

## 2019-12-20 ENCOUNTER — APPOINTMENT (OUTPATIENT)
Dept: CT IMAGING | Facility: CLINIC | Age: 64
End: 2019-12-20
Payer: MEDICARE

## 2019-12-20 ENCOUNTER — OUTPATIENT (OUTPATIENT)
Dept: OUTPATIENT SERVICES | Facility: HOSPITAL | Age: 64
LOS: 1 days | End: 2019-12-20
Payer: MEDICARE

## 2019-12-20 DIAGNOSIS — Z98.890 OTHER SPECIFIED POSTPROCEDURAL STATES: Chronic | ICD-10-CM

## 2019-12-20 DIAGNOSIS — Z00.00 ENCOUNTER FOR GENERAL ADULT MEDICAL EXAMINATION WITHOUT ABNORMAL FINDINGS: ICD-10-CM

## 2019-12-20 DIAGNOSIS — C34.92 MALIGNANT NEOPLASM OF UNSPECIFIED PART OF LEFT BRONCHUS OR LUNG: Chronic | ICD-10-CM

## 2019-12-20 DIAGNOSIS — C34.90 MALIGNANT NEOPLASM OF UNSPECIFIED PART OF UNSPECIFIED BRONCHUS OR LUNG: ICD-10-CM

## 2019-12-20 DIAGNOSIS — E89.0 POSTPROCEDURAL HYPOTHYROIDISM: Chronic | ICD-10-CM

## 2019-12-20 PROCEDURE — 82565 ASSAY OF CREATININE: CPT

## 2019-12-20 PROCEDURE — 72191 CT ANGIOGRAPH PELV W/O&W/DYE: CPT | Mod: 26

## 2019-12-20 PROCEDURE — 72191 CT ANGIOGRAPH PELV W/O&W/DYE: CPT

## 2019-12-27 ENCOUNTER — OUTPATIENT (OUTPATIENT)
Dept: OUTPATIENT SERVICES | Facility: HOSPITAL | Age: 64
LOS: 1 days | End: 2019-12-27
Payer: MEDICARE

## 2019-12-27 VITALS
WEIGHT: 214.95 LBS | RESPIRATION RATE: 20 BRPM | OXYGEN SATURATION: 99 % | HEART RATE: 90 BPM | TEMPERATURE: 98 F | HEIGHT: 76 IN | SYSTOLIC BLOOD PRESSURE: 111 MMHG | DIASTOLIC BLOOD PRESSURE: 77 MMHG

## 2019-12-27 DIAGNOSIS — Z01.818 ENCOUNTER FOR OTHER PREPROCEDURAL EXAMINATION: ICD-10-CM

## 2019-12-27 DIAGNOSIS — E89.0 POSTPROCEDURAL HYPOTHYROIDISM: Chronic | ICD-10-CM

## 2019-12-27 DIAGNOSIS — N40.0 BENIGN PROSTATIC HYPERPLASIA WITHOUT LOWER URINARY TRACT SYMPTOMS: ICD-10-CM

## 2019-12-27 DIAGNOSIS — Z98.890 OTHER SPECIFIED POSTPROCEDURAL STATES: Chronic | ICD-10-CM

## 2019-12-27 DIAGNOSIS — C34.92 MALIGNANT NEOPLASM OF UNSPECIFIED PART OF LEFT BRONCHUS OR LUNG: Chronic | ICD-10-CM

## 2019-12-27 DIAGNOSIS — R31.9 HEMATURIA, UNSPECIFIED: ICD-10-CM

## 2019-12-27 LAB
ANION GAP SERPL CALC-SCNC: 13 MMOL/L — SIGNIFICANT CHANGE UP (ref 5–17)
BLD GP AB SCN SERPL QL: NEGATIVE — SIGNIFICANT CHANGE UP
BUN SERPL-MCNC: 13 MG/DL — SIGNIFICANT CHANGE UP (ref 7–23)
CALCIUM SERPL-MCNC: 9 MG/DL — SIGNIFICANT CHANGE UP (ref 8.4–10.5)
CHLORIDE SERPL-SCNC: 104 MMOL/L — SIGNIFICANT CHANGE UP (ref 96–108)
CO2 SERPL-SCNC: 24 MMOL/L — SIGNIFICANT CHANGE UP (ref 22–31)
CREAT SERPL-MCNC: 0.94 MG/DL — SIGNIFICANT CHANGE UP (ref 0.5–1.3)
GLUCOSE SERPL-MCNC: 68 MG/DL — LOW (ref 70–99)
HCT VFR BLD CALC: 43.7 % — SIGNIFICANT CHANGE UP (ref 39–50)
HGB BLD-MCNC: 13.9 G/DL — SIGNIFICANT CHANGE UP (ref 13–17)
MCHC RBC-ENTMCNC: 27.9 PG — SIGNIFICANT CHANGE UP (ref 27–34)
MCHC RBC-ENTMCNC: 31.8 GM/DL — LOW (ref 32–36)
MCV RBC AUTO: 87.8 FL — SIGNIFICANT CHANGE UP (ref 80–100)
PLATELET # BLD AUTO: 293 K/UL — SIGNIFICANT CHANGE UP (ref 150–400)
POTASSIUM SERPL-MCNC: 3.9 MMOL/L — SIGNIFICANT CHANGE UP (ref 3.5–5.3)
POTASSIUM SERPL-SCNC: 3.9 MMOL/L — SIGNIFICANT CHANGE UP (ref 3.5–5.3)
RBC # BLD: 4.98 M/UL — SIGNIFICANT CHANGE UP (ref 4.2–5.8)
RBC # FLD: 12.9 % — SIGNIFICANT CHANGE UP (ref 10.3–14.5)
RH IG SCN BLD-IMP: POSITIVE — SIGNIFICANT CHANGE UP
SODIUM SERPL-SCNC: 141 MMOL/L — SIGNIFICANT CHANGE UP (ref 135–145)
WBC # BLD: 5.4 K/UL — SIGNIFICANT CHANGE UP (ref 3.8–10.5)
WBC # FLD AUTO: 5.4 K/UL — SIGNIFICANT CHANGE UP (ref 3.8–10.5)

## 2019-12-27 PROCEDURE — 86901 BLOOD TYPING SEROLOGIC RH(D): CPT

## 2019-12-27 PROCEDURE — 80048 BASIC METABOLIC PNL TOTAL CA: CPT

## 2019-12-27 PROCEDURE — 85027 COMPLETE CBC AUTOMATED: CPT

## 2019-12-27 PROCEDURE — G0463: CPT

## 2019-12-27 PROCEDURE — 86850 RBC ANTIBODY SCREEN: CPT

## 2019-12-27 PROCEDURE — 86900 BLOOD TYPING SEROLOGIC ABO: CPT

## 2019-12-27 RX ORDER — ALPRAZOLAM 0.25 MG
1 TABLET ORAL
Qty: 0 | Refills: 0 | DISCHARGE

## 2019-12-27 RX ORDER — ALBUTEROL 90 UG/1
1 AEROSOL, METERED ORAL
Qty: 0 | Refills: 0 | DISCHARGE

## 2019-12-27 RX ORDER — ACLIDINIUM BROMIDE 400 UG/1
1 POWDER, METERED RESPIRATORY (INHALATION)
Qty: 0 | Refills: 0 | DISCHARGE

## 2019-12-27 NOTE — H&P PST ADULT - NSICDXFAMILYHX_GEN_ALL_CORE_FT
FAMILY HISTORY:  Father  Still living? No  Family history of pancreatic cancer, Age at diagnosis: Age Unknown    Mother  Still living? No  Family history of heart disease, Age at diagnosis: Age Unknown    Sibling  Still living? Yes, Estimated age: 51-60  Family history of prostate cancer, Age at diagnosis: Age Unknown  Family history of heart disease, Age at diagnosis: Age Unknown

## 2019-12-27 NOTE — H&P PST ADULT - NSICDXPASTSURGICALHX_GEN_ALL_CORE_FT
PAST SURGICAL HISTORY:  H/O thyroidectomy total 4/ 2016    History of lung surgery 2015 benign lesion @ left lung    History of prostate surgery TURP 2014 and 2017    Lung cancer, left left upper lobe VATS 2010    Mediastinum Neoplasm S/P mediastinoscopy 1998    S/P ear surgery unspecified cholesteatoma - right tympanomastoidectomy 12/16/2016    S/P Lobectomy of Lung right upper [facility], [facility]

## 2019-12-27 NOTE — H&P PST ADULT - OTHER CARE PROVIDERS
Dr Marvin-pulmonary [phone],  Dr SquiresLdkjyuag-[phone] , christy Lee  [phone] Dr Marvin-pulmonary [phone],  Dr SquiresWxpewvxq-eoubdo-[phone] , christy Lee  [phone], Dr gary Goldberg (uro)

## 2019-12-27 NOTE — H&P PST ADULT - ENDOCRINE
Patient was not available for the therapy session at this time. Reason not seen: Patient requesting to discontinue therapy (12/01/19 0851). Patient reports that she is currently at baseline level of independent for mobility and ADLs. Patient denied PT/OT needs. RN verified that patient has been ambulating independently in her room. Re-Attempt Plan: Other (comment)(PT will sign off per patient request) (12/01/19 0851).
Patient was not available for the therapy session at this time. Reason not seen: Patient requesting to discontinue therapy (12/01/19 1000). Pt reports she is at baseline at this time and is having no difficulty with speech/cognition/swallow. Pt aware of speech therapy role and declined services. Re-Attempt Plan: Other (comment)(PT scheduled for discharge this date. ) (12/01/19 1000).   Speech therapy will sign off per patient request.
Patient was not available for the therapy session at this time. Reason not seen: Patient requesting to discontinue therapy(per PT pt reports no OT needs at this time) (12/01/19 0857). Re-Attempt Plan: (D/C OT 12/1/19) (12/01/19 0857).
Problem: Pain  Goal: #Acceptable pain level achieved/maintained at rest using NRS/Faces  This goal is used for patients who can self-report. Acceptable means the level is at or below the identified comfort/function goal.  Waiting for Assessment by MD and orders to be placed. Patient states she is fine with pain at this time. Will continue to monitor.
Problem: Stroke: Ischemic (Transient/Permanent)  Intervention: # Provide Stroke Education Packet within 24 hrs: supplement as needed  Stroke packet given.
negative

## 2019-12-27 NOTE — H&P PST ADULT - NSICDXPROBLEM_GEN_ALL_CORE_FT
PROBLEM DIAGNOSES  Problem: BPH (benign prostatic hyperplasia)  Assessment and Plan: prostate artery embo  cbc, bmp, t & S

## 2019-12-27 NOTE — H&P PST ADULT - HISTORY OF PRESENT ILLNESS
61 year old male with past medical hx of lung cancer s/p  b/l lobectomies (chemo/radiation 2010), COPD (mild), LILY non-compliant with CPAP, BPH,  thyroid cancer, underwent  total thyroidectomy (4/2016/radiation), GERD, pisano esophagus, C/O  colon polyps found on routine colonoscopy  presents to [facility] for scheduled colonoscopy anes on 8/30/18 64 year old male with past medical hx of lung cancer s/p  b/l lobectomies (chemo/radiation 2010), COPD (mild), LILY non-compliant with CPAP, BPH,  thyroid cancer, underwent  total thyroidectomy (4/2016/radiation), GERD, pisano esophagus, C/O  several episodes of urinary retention, which required catheterization,  had a TURP in 2014 and again in 2017, His most recent episode was approximately 1 month ago, while in [place]. He had catheter placed, with eventual removal. He again developed retention approximately 1 week ago and was evaluated in [facility] ED. He had catheter placed again and has been following with Dr. Gary goldberg after release. His catheter has been discontinued x 1 week ago He discussed repeat TURP with Dr. Goldberg, but was referred to IR to discuss prostate artery embo, since his symptoms have persisted despite 2 previous procedures. He continues to take Flomax BID, Currently denies hematuria, fever, chills, or inability to void since catheter removal. Seen & evaluated & now recommends for PAE on 1/2/20.

## 2019-12-27 NOTE — H&P PST ADULT - NSICDXPASTMEDICALHX_GEN_ALL_CORE_FT
PAST MEDICAL HISTORY:  Abdominal hernia     Anxiety     Zamudio's esophagus without dysplasia     BPH (Benign Prostatic Hyperplasia)     COPD (Chronic Obstructive Pulmonary Disease) treated with inhalers    Former smoker     GERD (Gastroesophageal Reflux Disease)     History of chemotherapy and radiation 1998    History of colon polyps     Lung Cancer right upper lobe 1997    Lung cancer, left upper lobe 2010    LILY (Obstructive Sleep Apnea) does not use CPAP, last used it 15 years ago    Pneumonia 1990    Thyroid nodule     Umbilical hernia     Unspecified cholesteatoma, right ear 12/2016

## 2019-12-30 NOTE — HISTORY OF PRESENT ILLNESS
[FreeTextEntry1] : This is a 63 y/o male with pmhx of GERD, Lung ca, LILY, and BPH who presents today for consultation for prostate artery embolization.  He reports being diagnosed with bph several years ago.  He has been following with Dr. Gary Goldberg for several years and had a TURP in 2014 and again in 2017.  Since his last TURP, he has had several episodes of urinary retention, which required catheterization. \par \par His most recent episode was approximately 1 month ago, while in [place].  He had catheter placed, with eventual removal.  He again developed retention approximately 1 week ago and was evaluated in [facility] ED.  He had catheter placed again and has been following with Dr. Goldberg after release.  He removed the catheter last evening.  He discussed repeat TURP with Dr. Goldberg, but was referred to IR to discuss PAE, since his symptoms have persisted despite 2 previous procedures. He continues to take Flomax BID and his I-PSS is 29.  \par Currently denies hematuria, fever, chills, or inability to void since catheter removal.

## 2019-12-30 NOTE — CONSULT LETTER
[Please see my note below.] : Please see my note below. [Dear  ___] : Dear  [unfilled], [Courtesy Letter:] : I had the pleasure of seeing your patient, [unfilled], in my office today. [Sincerely,] : Sincerely, [FreeTextEntry3] : Roge Ortiz MD

## 2019-12-30 NOTE — ASSESSMENT
[Other: _____] : [unfilled] [FreeTextEntry1] : This is a 63 y/o male with pmhx of GERD, Lung ca, LILY, and BPH who presents today for consultation for prostate artery embolization. He has had multiple episodes of obstructive symptoms due to BPH, that has required multiple catheterizations and hospitalizations.  His symptoms continue to be severe, despite 2 TURPs and medical management.\par   \par I discussed PAE, including the procedure, including the risks, benefits, alternatives, and expected post procedure course. He will have a CTA of the abdomen/pelvis and is tentatively scheduled for the procedure on 1/2/20.  I will be in touch with him after I review his imaging. \par \par I have provided the patient the opportunity to ask questions and have answered them to their satisfaction.  They are encouraged to contact our office with any further questions, concerns, or issues.\par \par

## 2020-01-02 ENCOUNTER — INPATIENT (INPATIENT)
Facility: HOSPITAL | Age: 65
LOS: 3 days | Discharge: HOME | DRG: 983 | End: 2020-01-06
Attending: HOSPITALIST | Admitting: RADIOLOGY
Payer: MEDICARE

## 2020-01-02 VITALS
TEMPERATURE: 98 F | HEART RATE: 80 BPM | SYSTOLIC BLOOD PRESSURE: 113 MMHG | DIASTOLIC BLOOD PRESSURE: 76 MMHG | RESPIRATION RATE: 17 BRPM | OXYGEN SATURATION: 95 %

## 2020-01-02 DIAGNOSIS — C34.92 MALIGNANT NEOPLASM OF UNSPECIFIED PART OF LEFT BRONCHUS OR LUNG: Chronic | ICD-10-CM

## 2020-01-02 DIAGNOSIS — R31.9 HEMATURIA, UNSPECIFIED: ICD-10-CM

## 2020-01-02 DIAGNOSIS — Z98.890 OTHER SPECIFIED POSTPROCEDURAL STATES: Chronic | ICD-10-CM

## 2020-01-02 DIAGNOSIS — J44.9 CHRONIC OBSTRUCTIVE PULMONARY DISEASE, UNSPECIFIED: ICD-10-CM

## 2020-01-02 DIAGNOSIS — E03.9 HYPOTHYROIDISM, UNSPECIFIED: ICD-10-CM

## 2020-01-02 DIAGNOSIS — G47.33 OBSTRUCTIVE SLEEP APNEA (ADULT) (PEDIATRIC): ICD-10-CM

## 2020-01-02 DIAGNOSIS — N40.1 BENIGN PROSTATIC HYPERPLASIA WITH LOWER URINARY TRACT SYMPTOMS: ICD-10-CM

## 2020-01-02 DIAGNOSIS — E89.0 POSTPROCEDURAL HYPOTHYROIDISM: Chronic | ICD-10-CM

## 2020-01-02 PROCEDURE — 76937 US GUIDE VASCULAR ACCESS: CPT | Mod: 26

## 2020-01-02 PROCEDURE — 36247 INS CATH ABD/L-EXT ART 3RD: CPT

## 2020-01-02 PROCEDURE — 37243 VASC EMBOLIZE/OCCLUDE ORGAN: CPT

## 2020-01-02 PROCEDURE — 99223 1ST HOSP IP/OBS HIGH 75: CPT | Mod: AI

## 2020-01-02 PROCEDURE — 76380 CAT SCAN FOLLOW-UP STUDY: CPT | Mod: 26,XU

## 2020-01-02 RX ORDER — ACETAMINOPHEN 500 MG
1000 TABLET ORAL ONCE
Refills: 0 | Status: COMPLETED | OUTPATIENT
Start: 2020-01-02 | End: 2020-01-02

## 2020-01-02 RX ORDER — ONDANSETRON 8 MG/1
4 TABLET, FILM COATED ORAL ONCE
Refills: 0 | Status: COMPLETED | OUTPATIENT
Start: 2020-01-02 | End: 2020-01-03

## 2020-01-02 RX ORDER — HYDROMORPHONE HYDROCHLORIDE 2 MG/ML
1 INJECTION INTRAMUSCULAR; INTRAVENOUS; SUBCUTANEOUS
Refills: 0 | Status: DISCONTINUED | OUTPATIENT
Start: 2020-01-02 | End: 2020-01-06

## 2020-01-02 RX ORDER — BUDESONIDE AND FORMOTEROL FUMARATE DIHYDRATE 160; 4.5 UG/1; UG/1
2 AEROSOL RESPIRATORY (INHALATION)
Refills: 0 | Status: DISCONTINUED | OUTPATIENT
Start: 2020-01-02 | End: 2020-01-06

## 2020-01-02 RX ORDER — CIPROFLOXACIN LACTATE 400MG/40ML
400 VIAL (ML) INTRAVENOUS ONCE
Refills: 0 | Status: DISCONTINUED | OUTPATIENT
Start: 2020-01-02 | End: 2020-01-03

## 2020-01-02 RX ORDER — SENNA PLUS 8.6 MG/1
2 TABLET ORAL AT BEDTIME
Refills: 0 | Status: DISCONTINUED | OUTPATIENT
Start: 2020-01-02 | End: 2020-01-06

## 2020-01-02 RX ORDER — NALOXONE HYDROCHLORIDE 4 MG/.1ML
0.1 SPRAY NASAL
Refills: 0 | Status: DISCONTINUED | OUTPATIENT
Start: 2020-01-02 | End: 2020-01-06

## 2020-01-02 RX ORDER — LEVOTHYROXINE SODIUM 125 MCG
250 TABLET ORAL DAILY
Refills: 0 | Status: DISCONTINUED | OUTPATIENT
Start: 2020-01-02 | End: 2020-01-06

## 2020-01-02 RX ORDER — DIAZEPAM 5 MG
5 TABLET ORAL ONCE
Refills: 0 | Status: DISCONTINUED | OUTPATIENT
Start: 2020-01-02 | End: 2020-01-02

## 2020-01-02 RX ORDER — PANTOPRAZOLE SODIUM 20 MG/1
40 TABLET, DELAYED RELEASE ORAL
Refills: 0 | Status: DISCONTINUED | OUTPATIENT
Start: 2020-01-02 | End: 2020-01-06

## 2020-01-02 RX ORDER — SODIUM CHLORIDE 9 MG/ML
1000 INJECTION, SOLUTION INTRAVENOUS
Refills: 0 | Status: DISCONTINUED | OUTPATIENT
Start: 2020-01-02 | End: 2020-01-06

## 2020-01-02 RX ORDER — POLYETHYLENE GLYCOL 3350 17 G/17G
17 POWDER, FOR SOLUTION ORAL DAILY
Refills: 0 | Status: DISCONTINUED | OUTPATIENT
Start: 2020-01-02 | End: 2020-01-06

## 2020-01-02 RX ORDER — HYDROMORPHONE HYDROCHLORIDE 2 MG/ML
30 INJECTION INTRAMUSCULAR; INTRAVENOUS; SUBCUTANEOUS
Refills: 0 | Status: DISCONTINUED | OUTPATIENT
Start: 2020-01-02 | End: 2020-01-03

## 2020-01-02 RX ORDER — ONDANSETRON 8 MG/1
4 TABLET, FILM COATED ORAL EVERY 6 HOURS
Refills: 0 | Status: DISCONTINUED | OUTPATIENT
Start: 2020-01-02 | End: 2020-01-06

## 2020-01-02 RX ADMIN — Medication 400 MILLIGRAM(S): at 13:16

## 2020-01-02 RX ADMIN — Medication 5 MILLIGRAM(S): at 21:43

## 2020-01-02 RX ADMIN — SENNA PLUS 2 TABLET(S): 8.6 TABLET ORAL at 21:51

## 2020-01-02 RX ADMIN — HYDROMORPHONE HYDROCHLORIDE 30 MILLILITER(S): 2 INJECTION INTRAMUSCULAR; INTRAVENOUS; SUBCUTANEOUS at 16:20

## 2020-01-02 RX ADMIN — BUDESONIDE AND FORMOTEROL FUMARATE DIHYDRATE 2 PUFF(S): 160; 4.5 AEROSOL RESPIRATORY (INHALATION) at 21:43

## 2020-01-02 RX ADMIN — HYDROMORPHONE HYDROCHLORIDE 30 MILLILITER(S): 2 INJECTION INTRAMUSCULAR; INTRAVENOUS; SUBCUTANEOUS at 13:25

## 2020-01-02 RX ADMIN — HYDROMORPHONE HYDROCHLORIDE 30 MILLILITER(S): 2 INJECTION INTRAMUSCULAR; INTRAVENOUS; SUBCUTANEOUS at 19:28

## 2020-01-02 NOTE — H&P ADULT - HISTORY OF PRESENT ILLNESS
64yoM w/ PMHx significant for lung cancer s/p B/L lobectomies (chemo/radiation in 2010), thyroid cancer now w/ hypothyroidism, COPD, LILY (non-compliant w/ CPAP), and BPH (s/p TURP in 2014 and 2017), who presents for planned prostate artery embolization given multiple recent episodes of urinary retention requiring catheterization, discontinued 1 week ago by outpatient provider, Dr. Neville, who then referred him to IR for this procedure. Procedure was performed on 1/2, and patient admitted for monitoring.

## 2020-01-02 NOTE — PROGRESS NOTE ADULT - SUBJECTIVE AND OBJECTIVE BOX
63y/o M with PMHX of BPH s    NPO status: NPO past midnight  Anticoagulation: None.    Allergies:No Known Allergies    PAST MEDICAL & SURGICAL HISTORY:  Former smoker  History of colon polyps  Zamudio's esophagus without dysplasia  History of chemotherapy: and radiation 1998  Unspecified cholesteatoma, right ear: 12/2016  Thyroid CA  Abdominal hernia  Umbilical hernia  Lung cancer, left: upper lobe 2010  GERD (Gastroesophageal Reflux Disease)  Anxiety  BPH (Benign Prostatic Hyperplasia)  Pneumonia: 1990  LILY (Obstructive Sleep Apnea): does not use CPAP, last used it 15 years ago  Lung Cancer: right upper lobe 1997  COPD (Chronic Obstructive Pulmonary Disease): treated with inhalers  S/P ear surgery: unspecified cholesteatoma - right tympanomastoidectomy 12/16/2016  History of lung surgery: 2015 benign lesion @ left lung  History of prostate surgery: TURP 2014 and 2017  H/O thyroidectomy: total 4/ 2016  Lung cancer, left: left upper lobe VATS 2010  Mediastinum Neoplasm: S/P mediastinoscopy 1998  S/P Lobectomy of Lung: right upper 1997, [facility]        Pertinent labs:        Consent: Procedure/risks/ Benefits explained. Informed consent obtained. Pt verbalizes understanding. 63y/o M with PMHX of BPH s/p TURP X 2 2014,2017 with continued urinary retention referred to IR for Prostate artery embolization. Denies hematuria, dysuria, fever, chills.     NPO status: NPO past midnight  Anticoagulation: None.    Allergies: No Known Allergies    PAST MEDICAL & SURGICAL HISTORY:  Former smoker  History of colon polyps  Zamudio's esophagus without dysplasia  History of chemotherapy: and radiation 1998  Unspecified cholesteatoma, right ear: 12/2016  Thyroid CA  Abdominal hernia  Umbilical hernia  Lung cancer, left: upper lobe 2010  GERD (Gastroesophageal Reflux Disease)  Anxiety  BPH (Benign Prostatic Hyperplasia)  Pneumonia: 1990  LILY (Obstructive Sleep Apnea): does not use CPAP, last used it 15 years ago  Lung Cancer: right upper lobe 1997  COPD (Chronic Obstructive Pulmonary Disease): treated with inhalers  S/P ear surgery: unspecified cholesteatoma - right tympanomastoidectomy 12/16/2016  History of lung surgery: 2015 benign lesion @ left lung  History of prostate surgery: TURP 2014 and 2017  H/O thyroidectomy: total 4/ 2016  Lung cancer, left: left upper lobe VATS 2010  Mediastinum Neoplasm: S/P mediastinoscopy 1998  S/P Lobectomy of Lung: right upper 1997, [facility]    Pertinent labs:  Complete Blood Count (12.27.19 @ 21:55)    WBC Count: 5.40 K/uL    RBC Count: 4.98 M/uL    Hemoglobin: 13.9 g/dL    Hematocrit: 43.7 %    Mean Cell Volume: 87.8 fl    Mean Cell Hemoglobin: 27.9 pg    Mean Cell Hemoglobin Conc: 31.8 gm/dL    Red Cell Distrib Width: 12.9 %    Platelet Count - Automated: 293 K/uL    Comprehensive Metabolic Panel (12.14.19 @ 11:34)    Sodium, Serum: 139 mmol/L    Potassium, Serum: 4.1 mmol/L    Chloride, Serum: 101 mmol/L    Carbon Dioxide, Serum: 25 mmol/L    Anion Gap, Serum: 13 mmol/L    Blood Urea Nitrogen, Serum: 14 mg/dL    Creatinine, Serum: 0.90 mg/dL    Glucose, Serum: 107 mg/dL    Calcium, Total Serum: 9.4 mg/dL    Protein Total, Serum: 7.3 g/dL    Albumin, Serum: 4.4 g/dL    Bilirubin Total, Serum: 1.1 mg/dL    Alkaline Phosphatase, Serum: 70 U/L    Aspartate Aminotransferase (AST/SGOT): 15 U/L    Alanine Aminotransferase (ALT/SGPT): 13 U/L    eGFR if Non : 89: Interpretative comment  The units for eGFR are mL/min/1.73M2 (normalized body surface area). The  eGFR is calculated from a serum creatinine using the CKD-EPI equation.  Other variables required for calculation are race, age and sex. Among  patients with chronic kidney disease (CKD), the eGFR is useful in  determining the stage of disease according to KDOQI CKD classification.  All eGFR results are reported numerically with the following  interpretation.          GFR                    With                 Without     (ml/min/1.73 m2)    Kidney Damage       Kidney Damage        >= 90                    Stage 1                     Normal        60-89                    Stage 2                     Decreased GFR        30-59     Stage 3                     Stage 3        15-29                    Stage 4                     Stage 4        < 15                      Stage 5                     Stage 5  Each stage of CKD assumes that the associated GFR level has been in  effect for at least 3 months. Determination of stages one and two (with  eGFR > 59 ml/min/m2) requires estimation of kidney damage for at least 3  months as defined by structural or functional abnormalities.  Limitations: All estimates of GFR will be less accurate for patients at  extremes of muscle mass (including but not limited to frail elderly,  critically ill, or cancer patients), those with unusual diets, and those  with conditions associated with reduced secretion or extrarenal  elimination of creatinine. The eGFR equation is not recommended for use  in patients with unstable creatinine levels. mL/min/1.73M2    eGFR if African American: 103 mL/min/1.73M2    Consent: Procedure/risks/ Benefits explained. Informed consent obtained. Pt verbalizes understanding.

## 2020-01-02 NOTE — H&P ADULT - NSHPPHYSICALEXAM_GEN_ALL_CORE
GEN: middle aged man, laying in stretcher in NAD  PSYCH: A&Ox3, mood and affect appear appropriate   SKIN: intact, no e/o rash  NEURO: no focal neurologic deficits appreciated  EYES: PERRL, anicteric  HEAD: NC, AT  NECK: supple  RESPI: no accessory muscle use, B/L air entry, CTAB   CARDIO: regular rate/rhythm, no LE edema B/L  ABD: soft, NT, ND, +BS  EXT: patient able to move all extremities spontaneously  VASC: peripheral pulses palpated

## 2020-01-02 NOTE — H&P ADULT - NSICDXPASTSURGICALHX_GEN_ALL_CORE_FT
PAST SURGICAL HISTORY:  H/O thyroidectomy total 4/ 2016    History of lung surgery 2015 benign lesion @ left lung    History of prostate surgery TURP 2014 and 2017    Lung cancer, left left upper lobe VATS 2010    Mediastinum Neoplasm S/P mediastinoscopy 1998    S/P ear surgery unspecified cholesteatoma - right tympanomastoidectomy 12/16/2016    S/P Lobectomy of Lung right upper [facility], [facility]

## 2020-01-02 NOTE — H&P ADULT - ASSESSMENT
64yoM w/ PMHx significant for lung cancer s/p B/L lobectomies (chemo/radiation in 2010), thyroid cancer now w/ hypothyroidism, COPD, LILY (non-compliant w/ CPAP), and BPH (s/p TURP in 2014 and 2017), who presents for planned prostate artery embolization given multiple recent episodes of urinary retention; procedure was performed on 1/2, and patient admitted for monitoring.

## 2020-01-02 NOTE — H&P ADULT - PROBLEM SELECTOR PLAN 1
- s/p prostate artery embolization performed by IR, w/o reported complications  - bedrest x 4hours  - analgesia perioperatively; patient no in pain currently  - continue monitoring; no labs in AM per IR  - SCDs for DVT PPx per IR

## 2020-01-02 NOTE — PROCEDURE NOTE - GENERAL PROCEDURE DETAILS
rt groin access. pelvic angiogram demonstrated significantly enlarged bilateral prostatic arteries. embolization of bilateral prostate arteries with 100-300 micron embospheres.  rt groin hemostasis with mynx,

## 2020-01-03 LAB
APPEARANCE UR: CLEAR — SIGNIFICANT CHANGE UP
BACTERIA # UR AUTO: NEGATIVE — SIGNIFICANT CHANGE UP
BILIRUB UR-MCNC: NEGATIVE — SIGNIFICANT CHANGE UP
COLOR SPEC: SIGNIFICANT CHANGE UP
DIFF PNL FLD: ABNORMAL
EPI CELLS # UR: 2 /HPF — SIGNIFICANT CHANGE UP
GLUCOSE UR QL: NEGATIVE — SIGNIFICANT CHANGE UP
HCV AB S/CO SERPL IA: 0.29 S/CO — SIGNIFICANT CHANGE UP (ref 0–0.99)
HCV AB SERPL-IMP: SIGNIFICANT CHANGE UP
HYALINE CASTS # UR AUTO: 5 /LPF — HIGH (ref 0–2)
KETONES UR-MCNC: NEGATIVE — SIGNIFICANT CHANGE UP
LEUKOCYTE ESTERASE UR-ACNC: ABNORMAL
NITRITE UR-MCNC: NEGATIVE — SIGNIFICANT CHANGE UP
PH UR: 7 — SIGNIFICANT CHANGE UP (ref 5–8)
PROT UR-MCNC: ABNORMAL
RBC CASTS # UR COMP ASSIST: 66 /HPF — HIGH (ref 0–4)
SP GR SPEC: 1.01 — SIGNIFICANT CHANGE UP (ref 1.01–1.02)
UROBILINOGEN FLD QL: NEGATIVE — SIGNIFICANT CHANGE UP
WBC UR QL: 7 /HPF — HIGH (ref 0–5)

## 2020-01-03 PROCEDURE — 99233 SBSQ HOSP IP/OBS HIGH 50: CPT

## 2020-01-03 PROCEDURE — 71045 X-RAY EXAM CHEST 1 VIEW: CPT | Mod: 26

## 2020-01-03 RX ORDER — DIAZEPAM 5 MG
5 TABLET ORAL ONCE
Refills: 0 | Status: DISCONTINUED | OUTPATIENT
Start: 2020-01-03 | End: 2020-01-03

## 2020-01-03 RX ORDER — PIPERACILLIN AND TAZOBACTAM 4; .5 G/20ML; G/20ML
3.38 INJECTION, POWDER, LYOPHILIZED, FOR SOLUTION INTRAVENOUS EVERY 8 HOURS
Refills: 0 | Status: DISCONTINUED | OUTPATIENT
Start: 2020-01-03 | End: 2020-01-05

## 2020-01-03 RX ORDER — PIPERACILLIN AND TAZOBACTAM 4; .5 G/20ML; G/20ML
3.38 INJECTION, POWDER, LYOPHILIZED, FOR SOLUTION INTRAVENOUS ONCE
Refills: 0 | Status: COMPLETED | OUTPATIENT
Start: 2020-01-03 | End: 2020-01-03

## 2020-01-03 RX ORDER — DIAZEPAM 5 MG
5 TABLET ORAL EVERY 8 HOURS
Refills: 0 | Status: DISCONTINUED | OUTPATIENT
Start: 2020-01-03 | End: 2020-01-05

## 2020-01-03 RX ORDER — TAMSULOSIN HYDROCHLORIDE 0.4 MG/1
0.4 CAPSULE ORAL ONCE
Refills: 0 | Status: COMPLETED | OUTPATIENT
Start: 2020-01-03 | End: 2020-01-03

## 2020-01-03 RX ORDER — ATROPA BELLADONNA AND OPIUM 16.2; 6 MG/1; MG/1
1 SUPPOSITORY RECTAL ONCE
Refills: 0 | Status: DISCONTINUED | OUTPATIENT
Start: 2020-01-03 | End: 2020-01-03

## 2020-01-03 RX ORDER — OXYBUTYNIN CHLORIDE 5 MG
5 TABLET ORAL EVERY 8 HOURS
Refills: 0 | Status: DISCONTINUED | OUTPATIENT
Start: 2020-01-03 | End: 2020-01-05

## 2020-01-03 RX ORDER — ACETAMINOPHEN 500 MG
650 TABLET ORAL ONCE
Refills: 0 | Status: COMPLETED | OUTPATIENT
Start: 2020-01-03 | End: 2020-01-03

## 2020-01-03 RX ORDER — TAMSULOSIN HYDROCHLORIDE 0.4 MG/1
0.4 CAPSULE ORAL AT BEDTIME
Refills: 0 | Status: DISCONTINUED | OUTPATIENT
Start: 2020-01-03 | End: 2020-01-04

## 2020-01-03 RX ORDER — KETOROLAC TROMETHAMINE 30 MG/ML
15 SYRINGE (ML) INJECTION EVERY 6 HOURS
Refills: 0 | Status: DISCONTINUED | OUTPATIENT
Start: 2020-01-03 | End: 2020-01-04

## 2020-01-03 RX ORDER — CIPROFLOXACIN LACTATE 400MG/40ML
400 VIAL (ML) INTRAVENOUS ONCE
Refills: 0 | Status: COMPLETED | OUTPATIENT
Start: 2020-01-03 | End: 2020-01-03

## 2020-01-03 RX ADMIN — PANTOPRAZOLE SODIUM 40 MILLIGRAM(S): 20 TABLET, DELAYED RELEASE ORAL at 05:15

## 2020-01-03 RX ADMIN — ATROPA BELLADONNA AND OPIUM 1 SUPPOSITORY(S): 16.2; 6 SUPPOSITORY RECTAL at 03:27

## 2020-01-03 RX ADMIN — Medication 5 MILLIGRAM(S): at 05:15

## 2020-01-03 RX ADMIN — SENNA PLUS 2 TABLET(S): 8.6 TABLET ORAL at 22:18

## 2020-01-03 RX ADMIN — ONDANSETRON 4 MILLIGRAM(S): 8 TABLET, FILM COATED ORAL at 18:52

## 2020-01-03 RX ADMIN — Medication 5 MILLIGRAM(S): at 12:04

## 2020-01-03 RX ADMIN — Medication 15 MILLIGRAM(S): at 10:53

## 2020-01-03 RX ADMIN — TAMSULOSIN HYDROCHLORIDE 0.4 MILLIGRAM(S): 0.4 CAPSULE ORAL at 22:42

## 2020-01-03 RX ADMIN — TAMSULOSIN HYDROCHLORIDE 0.4 MILLIGRAM(S): 0.4 CAPSULE ORAL at 01:52

## 2020-01-03 RX ADMIN — BUDESONIDE AND FORMOTEROL FUMARATE DIHYDRATE 2 PUFF(S): 160; 4.5 AEROSOL RESPIRATORY (INHALATION) at 05:15

## 2020-01-03 RX ADMIN — PIPERACILLIN AND TAZOBACTAM 200 GRAM(S): 4; .5 INJECTION, POWDER, LYOPHILIZED, FOR SOLUTION INTRAVENOUS at 19:30

## 2020-01-03 RX ADMIN — Medication 250 MICROGRAM(S): at 05:15

## 2020-01-03 RX ADMIN — ONDANSETRON 4 MILLIGRAM(S): 8 TABLET, FILM COATED ORAL at 08:02

## 2020-01-03 RX ADMIN — ATROPA BELLADONNA AND OPIUM 1 SUPPOSITORY(S): 16.2; 6 SUPPOSITORY RECTAL at 03:57

## 2020-01-03 RX ADMIN — HYDROMORPHONE HYDROCHLORIDE 30 MILLILITER(S): 2 INJECTION INTRAMUSCULAR; INTRAVENOUS; SUBCUTANEOUS at 07:03

## 2020-01-03 RX ADMIN — Medication 5 MILLIGRAM(S): at 16:46

## 2020-01-03 RX ADMIN — Medication 650 MILLIGRAM(S): at 18:57

## 2020-01-03 RX ADMIN — Medication 200 MILLIGRAM(S): at 16:46

## 2020-01-03 RX ADMIN — BUDESONIDE AND FORMOTEROL FUMARATE DIHYDRATE 2 PUFF(S): 160; 4.5 AEROSOL RESPIRATORY (INHALATION) at 22:42

## 2020-01-03 RX ADMIN — Medication 10 MILLIGRAM(S): at 12:04

## 2020-01-03 RX ADMIN — Medication 5 MILLIGRAM(S): at 22:17

## 2020-01-03 RX ADMIN — POLYETHYLENE GLYCOL 3350 17 GRAM(S): 17 POWDER, FOR SOLUTION ORAL at 00:47

## 2020-01-03 NOTE — PROGRESS NOTE ADULT - SUBJECTIVE AND OBJECTIVE BOX
Patient is a 64y old  Male who presents with a chief complaint of Prostate artery embolization (03 Jan 2020 10:32)    SUBJECTIVE / OVERNIGHT EVENTS: pt had an uncomfortable night, still having bladder spasms. No BM as of yet. Using PCA pump.     MEDICATIONS  (STANDING):  budesonide 160 MICROgram(s)/formoterol 4.5 MICROgram(s) Inhaler 2 Puff(s) Inhalation two times a day  ciprofloxacin   IVPB 400 milliGRAM(s) IV Intermittent once  HYDROmorphone PCA (1 mG/mL) 30 milliLiter(s) PCA Continuous PCA Continuous  ketorolac   Injectable 15 milliGRAM(s) IV Push every 6 hours  lactated ringers. 1000 milliLiter(s) (100 mL/Hr) IV Continuous <Continuous>  levothyroxine 250 MICROGram(s) Oral daily  pantoprazole    Tablet 40 milliGRAM(s) Oral before breakfast  senna 2 Tablet(s) Oral at bedtime    MEDICATIONS  (PRN):  bisacodyl Suppository 10 milliGRAM(s) Rectal daily PRN Constipation  HYDROmorphone  Injectable 1 milliGRAM(s) IV Push every 10 minutes PRN Severe Pain (7 - 10)  naloxone Injectable 0.1 milliGRAM(s) IV Push every 3 minutes PRN For ANY of the following changes in patient status:  A. RR LESS THAN 10 breaths per minute, B. Oxygen saturation LESS THAN 90%, C. Sedation score of 6  ondansetron Injectable 4 milliGRAM(s) IV Push every 6 hours PRN Nausea  oxybutynin 5 milliGRAM(s) Oral every 8 hours PRN Bladder spasms  polyethylene glycol 3350 17 Gram(s) Oral daily PRN Constipation      Vital Signs Last 24 Hrs  T(C): 36.9 (03 Jan 2020 05:37), Max: 37.1 (02 Jan 2020 18:10)  T(F): 98.4 (03 Jan 2020 05:37), Max: 98.7 (02 Jan 2020 18:10)  HR: 93 (03 Jan 2020 05:37) (80 - 93)  BP: 118/74 (03 Jan 2020 05:37) (103/69 - 118/74)  BP(mean): --  RR: 18 (03 Jan 2020 05:37) (17 - 18)  SpO2: 97% (03 Jan 2020 05:37) (94% - 99%)  CAPILLARY BLOOD GLUCOSE        I&O's Summary    02 Jan 2020 07:01  -  03 Jan 2020 07:00  --------------------------------------------------------  IN: 2380 mL / OUT: 600 mL / NET: 1780 mL    03 Jan 2020 07:01  -  03 Jan 2020 11:36  --------------------------------------------------------  IN: 740 mL / OUT: 400 mL / NET: 340 mL        PHYSICAL EXAM:  GENERAL: NAD  EYES: conjunctiva and sclera clear  NECK: Supple, No JVD  CHEST/LUNG: Clear to auscultation bilaterally; No wheeze  HEART: +S1/S2, reg   ABDOMEN: Soft, Nontender, Nondistended  EXTREMITIES:  no peripheral edema noted, +dorsalis pedis b/l   PSYCH: AAOx3  : wolf in place

## 2020-01-03 NOTE — PROVIDER CONTACT NOTE (OTHER) - BACKGROUND
Pt s/p prostate artery embolization and pelvic angiogram. Overnight patient experienced bladder spasms and decrease output in bloom. During 1/3 day- output WDL and pt c/o of intermittent bladder spasm

## 2020-01-03 NOTE — PROGRESS NOTE ADULT - SUBJECTIVE AND OBJECTIVE BOX
Interventional Radiology    S/P pelvic angiogram and prostate artery embolization, POD # 1.      The pt c/o lower abdominal pain and lower back pain that is 5/10.  He stated his pain has improved since last night and this morning.  He c/o intermittent leaking around the bloom catheter and bladder spasms.  Pt denies N/V since the afternoon but he stated he vomited in the morning.  He was able to eat chicken noodle soup for lunch and tolerated well without vomiting.  He had a bowel movement in the afternoon.      PCA pump has been discontinued and the pt is on PO pain medications.    Vital Signs Last 24 Hrs  T(C): 37.4 (03 Jan 2020 13:28), Max: 37.4 (03 Jan 2020 13:28)  T(F): 99.4 (03 Jan 2020 13:28), Max: 99.4 (03 Jan 2020 13:28)  HR: 90 (03 Jan 2020 13:28) (80 - 93)  BP: 94/68 (03 Jan 2020 13:28) (94/68 - 118/74)  BP(mean): --  RR: 18 (03 Jan 2020 13:28) (17 - 18)  SpO2: 94% (03 Jan 2020 13:28) (94% - 99%)    General Appearance: NAD, awake, alert    Procedure Site (R groin): + tender with palpation, no bleeding or hematoma, soft    RLE pulses: +2 R femoral, + 1 R popliteal, + 1 DP and PT      I&O's Detail    OUT:    Indwelling Catheter - Urethral: 600 mL    A/P  63 y/o M with H/O lung cancer s/p B/L lobectomies (chemo/radiation in 2010), thyroid cancer now w/ hypothyroidism, COPD, LILY (non-compliant w/ CPAP), and BPH (s/p TURP in 2014 and 2017) now s/p prostate artery embolization on 1/2 by IR Dr. Roge Ortiz    Continue global medical management as per primary team.  Continue clears and advance diet as tolerated.  Urology team to f/u with patient regarding bladder spasms.  Toradol for pain.  Trend vitals, labs  IR will continue to follow pt.    Pt and his wife were given IR contact information and instructed to f/u with IR, Dr. Roge Ortiz, in 1 month after discharge.  The follow up appointment can be scheduled by calling IR scheduling office at [phone].    Case d/w IR attending Dr. Roge Ortiz.    Nicola Wang, RPA-C  [facility] [id number]  Ext [phone]

## 2020-01-03 NOTE — CHART NOTE - NSCHARTNOTEFT_GEN_A_CORE
Medicine NP note:    Vital Signs Last 24 Hrs  T(C): 39.2 (03 Jan 2020 18:38), Max: 39.2 (03 Jan 2020 18:38)  T(F): 102.6 (03 Jan 2020 18:38), Max: 102.6 (03 Jan 2020 18:38)  HR: 95 (03 Jan 2020 18:38) (86 - 95)  BP: 115/75 (03 Jan 2020 18:38) (94/68 - 118/74)  BP(mean): --  RR: 18 (03 Jan 2020 18:38) (18 - 18)  SpO2: 94% (03 Jan 2020 18:38) (94% - 99%)     64yoM w/ PMHx significant for lung cancer s/p B/L lobectomies (chemo/radiation in 2010), thyroid cancer now w/ hypothyroidism, COPD, LILY (non-compliant w/ CPAP), and BPH (s/p TURP in 2014 and 2017), who presents on 1/2 for planned prostate artery embolization given multiple recent episodes of urinary retention; procedure was performed on 1/2, and patient admitted for monitoring post procedure.      Benign prostatic hyperplasia with urinary retention and s/p prostate artery embolization performed by IR, with fever 102.0 @18:30  - bloom remains in place for retention  - Blood cx x2, urine stat, urine cx, cxr urgent, abx changed to Zosyn   - discussed with IR post procedure and Dr. Karimi         - will f/u cxr, cx results    JOSHUA Trejo NP  [phone]

## 2020-01-03 NOTE — PROGRESS NOTE ADULT - SUBJECTIVE AND OBJECTIVE BOX
Day 1 of Anesthesia Pain Management Service    SUBJECTIVE: I've had a tough night    Pain Scale Score:	[X] Refer to charted pain scores    THERAPY:    [ ] IV PCA Morphine		[ ] 5 mg/mL	[ ] 1 mg/mL  [X] IV PCA Hydromorphone	[ ] 5 mg/mL	[X] 1 mg/mL  [ ] IV PCA Fentanyl		[ ] 50 micrograms/mL    Demand dose: 0.2 mg     Lockout: 6 minutes   Continuous Rate: 0 mg/hr  4 Hour Limit: 4 mg    MEDICATIONS  (STANDING):  budesonide 160 MICROgram(s)/formoterol 4.5 MICROgram(s) Inhaler 2 Puff(s) Inhalation two times a day  ciprofloxacin   IVPB 400 milliGRAM(s) IV Intermittent once  HYDROmorphone PCA (1 mG/mL) 30 milliLiter(s) PCA Continuous PCA Continuous  lactated ringers. 1000 milliLiter(s) (100 mL/Hr) IV Continuous <Continuous>  levothyroxine 250 MICROGram(s) Oral daily  pantoprazole    Tablet 40 milliGRAM(s) Oral before breakfast  senna 2 Tablet(s) Oral at bedtime    MEDICATIONS  (PRN):  HYDROmorphone  Injectable 1 milliGRAM(s) IV Push every 10 minutes PRN Severe Pain (7 - 10)  naloxone Injectable 0.1 milliGRAM(s) IV Push every 3 minutes PRN For ANY of the following changes in patient status:  A. RR LESS THAN 10 breaths per minute, B. Oxygen saturation LESS THAN 90%, C. Sedation score of 6  ondansetron Injectable 4 milliGRAM(s) IV Push every 6 hours PRN Nausea  oxybutynin 5 milliGRAM(s) Oral every 8 hours PRN Bladder spasms  polyethylene glycol 3350 17 Gram(s) Oral daily PRN Constipation      OBJECTIVE:    Sedation Score:	[ X] Alert 	[ ] Drowsy 	[ ] Arousable	[ ] Asleep	[ ] Unresponsive    Side Effects:	[ ] None	[X ] Nausea	[ ] Vomiting	[ ] Pruritus  		[ ] Other:    Vital Signs Last 24 Hrs  T(C): 36.9 (03 Jan 2020 05:37), Max: 37.1 (02 Jan 2020 18:10)  T(F): 98.4 (03 Jan 2020 05:37), Max: 98.7 (02 Jan 2020 18:10)  HR: 93 (03 Jan 2020 05:37) (80 - 93)  BP: 118/74 (03 Jan 2020 05:37) (103/69 - 118/74)  BP(mean): --  RR: 18 (03 Jan 2020 05:37) (17 - 18)  SpO2: 97% (03 Jan 2020 05:37) (94% - 99%)    ASSESSMENT/ PLAN    Therapy to  be:               [X] Continued   [ ] Discontinued   [ ] Changed to PRN Analgesics    Documentation and Verification of current medications:   [X] Done	[ ] Not done, not eligible    Comments: Antiemetics prn nausea. Endorsing spasm medicated with Valium last night and Ditropan.

## 2020-01-03 NOTE — PROGRESS NOTE ADULT - PROBLEM SELECTOR PLAN 1
- s/p prostate artery embolization performed by IR, w/o reported complications  - bloom placed overnight for retention  - valium for bladder spasms   - f/u urology recs   - bowel regimen for constipation  - pain control  - advance diet as tolerated - s/p prostate artery embolization performed by IR, w/o reported complications  - bloom placed overnight for retention  - ditropan for bladder spasms   - f/u urology recs   - bowel regimen for constipation  - pain control  - advance diet as tolerated

## 2020-01-03 NOTE — PROCEDURE NOTE - ADDITIONAL PROCEDURE DETAILS
Called to assess bloom catheter which is leaking sherrill to spasms. Balloon taken down and bloom advanced to hub. bladder successfully irrigated with ns and one clot irrigated. 15cc placed into bloom to help improve positioning.   has been using belladonna and valium. will add ditropan

## 2020-01-03 NOTE — PROVIDER CONTACT NOTE (OTHER) - SITUATION
Pt febrile at 102.6- pt states he feels "crappy". Pt s/p prostate artery embolization and pelvic angiogram.

## 2020-01-03 NOTE — PROGRESS NOTE ADULT - SUBJECTIVE AND OBJECTIVE BOX
Interventional Radiology Follow- Up Note      64y Male s/p PAE on 1/2/20 in Interventional Radiology with Dr Ortiz.     Patient seen and examined @ bedside with Dr. Ortiz. Pt reporting back/abdominal (gas-like) pain overnight, currently 5/10.  Pt using PCA intermittently with mild relief.  Reports last bm was 1/1.    Also reporting episodes of bladder spasm, with leaking around bloom catheter.  Reports episode of spasm earlier this morning, with passage of small blood clot.  Symptoms have mildly improved since flushing/manipulation by urology.     Tolerating small amounts of clears, but had episode of nausea earlier this morning. OOB ambulating to BR.     Vitals: T(F): 98.4 (01-03-20 @ 05:37), Max: 98.7 (01-02-20 @ 18:10)  HR: 93 (01-03-20 @ 05:37) (80 - 93)  BP: 118/74 (01-03-20 @ 05:37) (103/69 - 118/74)  RR: 18 (01-03-20 @ 05:37) (17 - 18)  SpO2: 97% (01-03-20 @ 05:37) (94% - 99%)  Wt(kg): --    LABS:    pending.    I&O's Detail    02 Jan 2020 07:01  -  03 Jan 2020 07:00  --------------------------------------------------------  IN:    lactated ringers.: 1600 mL    Oral Fluid: 780 mL  Total IN: 2380 mL    OUT:    Indwelling Catheter - Urethral: 600 mL  Total OUT: 600 mL    Total NET: 1780 mL      03 Jan 2020 07:01  -  03 Jan 2020 09:51  --------------------------------------------------------  IN:    lactated ringers.: 300 mL    Oral Fluid: 240 mL  Total IN: 540 mL    OUT:  Total OUT: 0 mL    Total NET: 540 mL      PHYSICAL EXAM:  General: Nontoxic, in NAD  Neuro:  Alert & oriented x 3  Lung: , respirations nonlabored, good inspiratory effort  Abdomen: soft, NTND.   : bloom to bsd, clear yellow urine in tubing, small amount of pink tinged urine in drainage bag, mild leaking around meatus  Extremities: no pedal edema or calf tenderness noted, right groin puncture site dsg c/d/i, +2 femoral and DP pulse, +movement, +sensation, warm to touch      Impression: 64yoM w/ PMHx significant for lung cancer s/p B/L lobectomies (chemo/radiation in 2010), thyroid cancer now w/ hypothyroidism, COPD, LILY (non-compliant w/ CPAP), and BPH (s/p TURP in 2014 and 2017) now s/p prostate artery embolization on 1/2, with persistent bladder spasms, constipation, and abdominal/back pain.     Plan:    -clears, adv diet as tolerated  -zofran prn  -IVL once tolerating PO  -continue to monitor output  -Dr. Ortiz discussed case with Dr. Goldberg, pt likely to be d/c'd with bloom/leg bag.  Urology team to f/u with patient regarding bladder spasms    -OOB ambulate  -pt with constipation, likely worsened 2/2 anesthesia/PCA, continue bowel regimen  -simethicone prn  Toradol for back pain  -transition to oral pain regimen once tolerating PO  -trend vitals, labs  -will continue to follow pt.    Above d/w Medicine team.    Please call IR at extension [phone] with any questions, concerns, or issues regarding above.

## 2020-01-04 DIAGNOSIS — R50.9 FEVER, UNSPECIFIED: ICD-10-CM

## 2020-01-04 LAB
HCT VFR BLD CALC: 37.5 % — LOW (ref 39–50)
HGB BLD-MCNC: 12 G/DL — LOW (ref 13–17)
MCHC RBC-ENTMCNC: 27.8 PG — SIGNIFICANT CHANGE UP (ref 27–34)
MCHC RBC-ENTMCNC: 32 GM/DL — SIGNIFICANT CHANGE UP (ref 32–36)
MCV RBC AUTO: 86.8 FL — SIGNIFICANT CHANGE UP (ref 80–100)
NRBC # BLD: 0 /100 WBCS — SIGNIFICANT CHANGE UP (ref 0–0)
PLATELET # BLD AUTO: 236 K/UL — SIGNIFICANT CHANGE UP (ref 150–400)
RBC # BLD: 4.32 M/UL — SIGNIFICANT CHANGE UP (ref 4.2–5.8)
RBC # FLD: 12.7 % — SIGNIFICANT CHANGE UP (ref 10.3–14.5)
WBC # BLD: 11.67 K/UL — HIGH (ref 3.8–10.5)
WBC # FLD AUTO: 11.67 K/UL — HIGH (ref 3.8–10.5)

## 2020-01-04 PROCEDURE — 99233 SBSQ HOSP IP/OBS HIGH 50: CPT

## 2020-01-04 PROCEDURE — 71045 X-RAY EXAM CHEST 1 VIEW: CPT | Mod: 26

## 2020-01-04 PROCEDURE — 99223 1ST HOSP IP/OBS HIGH 75: CPT | Mod: GC

## 2020-01-04 RX ORDER — TAMSULOSIN HYDROCHLORIDE 0.4 MG/1
0.4 CAPSULE ORAL
Refills: 0 | Status: DISCONTINUED | OUTPATIENT
Start: 2020-01-04 | End: 2020-01-06

## 2020-01-04 RX ORDER — ACETAMINOPHEN 500 MG
975 TABLET ORAL ONCE
Refills: 0 | Status: COMPLETED | OUTPATIENT
Start: 2020-01-04 | End: 2020-01-04

## 2020-01-04 RX ORDER — TAMSULOSIN HYDROCHLORIDE 0.4 MG/1
0.4 CAPSULE ORAL DAILY
Refills: 0 | Status: DISCONTINUED | OUTPATIENT
Start: 2020-01-04 | End: 2020-01-04

## 2020-01-04 RX ORDER — ACETAMINOPHEN 500 MG
650 TABLET ORAL EVERY 6 HOURS
Refills: 0 | Status: DISCONTINUED | OUTPATIENT
Start: 2020-01-04 | End: 2020-01-06

## 2020-01-04 RX ADMIN — BUDESONIDE AND FORMOTEROL FUMARATE DIHYDRATE 2 PUFF(S): 160; 4.5 AEROSOL RESPIRATORY (INHALATION) at 06:07

## 2020-01-04 RX ADMIN — Medication 5 MILLIGRAM(S): at 21:36

## 2020-01-04 RX ADMIN — BUDESONIDE AND FORMOTEROL FUMARATE DIHYDRATE 2 PUFF(S): 160; 4.5 AEROSOL RESPIRATORY (INHALATION) at 21:36

## 2020-01-04 RX ADMIN — PIPERACILLIN AND TAZOBACTAM 25 GRAM(S): 4; .5 INJECTION, POWDER, LYOPHILIZED, FOR SOLUTION INTRAVENOUS at 19:16

## 2020-01-04 RX ADMIN — PIPERACILLIN AND TAZOBACTAM 25 GRAM(S): 4; .5 INJECTION, POWDER, LYOPHILIZED, FOR SOLUTION INTRAVENOUS at 11:39

## 2020-01-04 RX ADMIN — Medication 975 MILLIGRAM(S): at 09:22

## 2020-01-04 RX ADMIN — SODIUM CHLORIDE 100 MILLILITER(S): 9 INJECTION, SOLUTION INTRAVENOUS at 19:16

## 2020-01-04 RX ADMIN — Medication 5 MILLIGRAM(S): at 06:07

## 2020-01-04 RX ADMIN — TAMSULOSIN HYDROCHLORIDE 0.4 MILLIGRAM(S): 0.4 CAPSULE ORAL at 21:36

## 2020-01-04 RX ADMIN — SODIUM CHLORIDE 100 MILLILITER(S): 9 INJECTION, SOLUTION INTRAVENOUS at 11:39

## 2020-01-04 RX ADMIN — PIPERACILLIN AND TAZOBACTAM 25 GRAM(S): 4; .5 INJECTION, POWDER, LYOPHILIZED, FOR SOLUTION INTRAVENOUS at 02:59

## 2020-01-04 RX ADMIN — Medication 5 MILLIGRAM(S): at 09:22

## 2020-01-04 RX ADMIN — Medication 5 MILLIGRAM(S): at 18:09

## 2020-01-04 RX ADMIN — Medication 250 MICROGRAM(S): at 06:07

## 2020-01-04 RX ADMIN — SENNA PLUS 2 TABLET(S): 8.6 TABLET ORAL at 21:36

## 2020-01-04 RX ADMIN — PANTOPRAZOLE SODIUM 40 MILLIGRAM(S): 20 TABLET, DELAYED RELEASE ORAL at 06:07

## 2020-01-04 RX ADMIN — Medication 5 MILLIGRAM(S): at 01:09

## 2020-01-04 RX ADMIN — TAMSULOSIN HYDROCHLORIDE 0.4 MILLIGRAM(S): 0.4 CAPSULE ORAL at 06:28

## 2020-01-04 NOTE — CONSULT NOTE ADULT - SUBJECTIVE AND OBJECTIVE BOX
Patient is a 64y old  Male who presents with a chief complaint of elective prostate artery embolization (2020 09:03)      HPI:  64yoM w/ PMHx significant for lung cancer s/p B/L lobectomies (chemo/radiation in ), thyroid cancer now w/ hypothyroidism, COPD, LILY (non-compliant w/ CPAP), and BPH (s/p TURP in  and ), who presents for planned prostate artery embolization given multiple recent episodes of urinary retention requiring catheterization, discontinued 1 week ago by outpatient provider, Dr. Neville, who then referred him to IR for this procedure. Procedure was performed on , and patient admitted for monitoring. (2020 15:54)    planned prostate artery embolization given multiple recent episodes of urinary retention; procedure was performed on , and patient admitted for monitoring.   started spiking fevers yesterday  ID consulted for workup and antibiotic management     PAST MEDICAL & SURGICAL HISTORY:  Former smoker  History of colon polyps  Zamudio's esophagus without dysplasia  History of chemotherapy: and radiation   Unspecified cholesteatoma, right ear: 2016  Thyroid nodule  Abdominal hernia  Umbilical hernia  Lung cancer, left: upper lobe   GERD (Gastroesophageal Reflux Disease)  Anxiety  BPH (Benign Prostatic Hyperplasia)  Pneumonia:   LILY (Obstructive Sleep Apnea): does not use CPAP, last used it 15 years ago  Lung Cancer: right upper lobe   COPD (Chronic Obstructive Pulmonary Disease): treated with inhalers  S/P ear surgery: unspecified cholesteatoma - right tympanomastoidectomy 2016  History of lung surgery: 2015 benign lesion @ left lung  History of prostate surgery: TURP  and   H/O thyroidectomy: total 2016  Lung cancer, left: left upper lobe VATS   Mediastinum Neoplasm: S/P mediastinoscopy   S/P Lobectomy of Lung: right upper , [facility]      Allergies  No Known Allergies        ANTIMICROBIALS:  piperacillin/tazobactam IVPB.. 3.375 every 8 hours      MEDICATIONS  (STANDING):    ciprofloxacin   IVPB   200 mL/Hr IV Intermittent (20 @ 16:46)    piperacillin/tazobactam IVPB.   200 mL/Hr IV Intermittent (20 @ 19:30)    piperacillin/tazobactam IVPB..   25 mL/Hr IV Intermittent (20 @ 11:39)   25 mL/Hr IV Intermittent (20 @ 02:59)        OTHER MEDS: MEDICATIONS  (STANDING):  bisacodyl Suppository 10 daily PRN  budesonide 160 MICROgram(s)/formoterol 4.5 MICROgram(s) Inhaler 2 two times a day  diazepam    Tablet 5 every 8 hours PRN  HYDROmorphone  Injectable 1 every 10 minutes PRN  levothyroxine 250 daily  ondansetron Injectable 4 every 6 hours PRN  oxybutynin 5 every 8 hours PRN  pantoprazole    Tablet 40 before breakfast  polyethylene glycol 3350 17 daily PRN  senna 2 at bedtime  tamsulosin 0.4 two times a day      SOCIAL HISTORY:     former smoker, patient lives with his family, able to participate in all ADLs/IADLs independently    FAMILY HISTORY:  Family history of prostate cancer (Sibling)  Family history of pancreatic cancer (Father)  Family history of heart disease (Mother, Sibling)      REVIEW OF SYSTEMS  [  ] ROS unobtainable because:    [  ] All other systems negative except as noted below:	    Constitutional:  [ ] fever [ ] chills  [ ] weight loss  [ ] weakness  Skin:  [ ] rash [ ] phlebitis	  Eyes: [ ] icterus [ ] pain  [ ] discharge	  ENMT: [ ] sore throat  [ ] thrush [ ] ulcers [ ] exudates  Respiratory: [ ] dyspnea [ ] hemoptysis [ ] cough [ ] sputum	  Cardiovascular:  [ ] chest pain [ ] palpitations [ ] edema	  Gastrointestinal:  [ ] nausea [ ] vomiting [ ] diarrhea [ ] constipation [ ] pain	  Genitourinary:  [ ] dysuria [ ] frequency [ ] hematuria [ ] discharge [ ] flank pain  [ ] incontinence  Musculoskeletal:  [ ] myalgias [ ] arthralgias [ ] arthritis  [ ] back pain  Neurological:  [ ] headache [ ] seizures  [ ] confusion/altered mental status  Psychiatric:  [ ] anxiety [ ] depression	  Hematology/Lymphatics:  [ ] lymphadenopathy  Endocrine:  [ ] adrenal [ ] thyroid  Allergic/Immunologic:	 [ ] transplant [ ] seasonal    Vital Signs Last 24 Hrs  T(F): 98.6 (20 @ 13:27), Max: 102.6 (20 @ 18:38)    Vital Signs Last 24 Hrs  HR: 88 (20 @ 13:27) (88 - 95)  BP: 109/64 (20 @ 13:27) (100/65 - 116/71)  RR: 18 (20 @ 13:27)  SpO2: 97% (20 @ 13:27) (94% - 97%)  Wt(kg): --    PHYSICAL EXAM:  Constitutional: non-toxic, no distress, awake  HEAD/EYES: atraumatic, anicteric, no conjunctival injection  ENT:  supple, no sores, no thrush  Cardiovascular:   normal S1, S2, no murmur, no edema  Respiratory:  equal breath sounds, no wheezes, no rales  GI:  soft, non-tender, normal bowel sounds  :  no bloom, no CVA tenderness  Musculoskeletal:  no synovitis, normal ROM  Neurologic: awake and alert,  normal strength, no focal findings  Skin:  no rash, no erythema, no phlebitis  Heme/Onc: no lymphadenopathy   Psychiatric:  awake, alert, appropriate mood          WBC Count: 11.67 K/uL ( @ 10:29)                 Basic Metabolic Panel (12.27.19 @ 17:52)    Sodium, Serum: 141 mmol/L    Potassium, Serum: 3.9 mmol/L    Chloride, Serum: 104 mmol/L    Carbon Dioxide, Serum: 24 mmol/L    Anion Gap, Serum: 13 mmol/L    Blood Urea Nitrogen, Serum: 13 mg/dL    Creatinine, Serum: 0.94 mg/dL    Glucose, Serum: 68 mg/dL    Calcium, Total Serum: 9.0 mg/dL    eGFR if Non : 84: Interpretative comment  The units for eGFR are mL/min/1.73M2 (normalized body surface area). The  eGFR is calculated from a serum creatinine using the CKD-EPI equation.  Other variables required for calculation are race, age and sex. Among  patients with chronic kidney disease (CKD), the eGFR is useful in  determining the stage of disease according to KDOQI CKD classification.  All eGFR results are reported numerically with the following  interpretation.          GFR                    With                 Without     (ml/min/1.73 m2)    Kidney Damage       Kidney Damage        >= 90                    Stage 1                     Normal        60-89                    Stage 2                     Decreased GFR        30-59     Stage 3                     Stage 3        15-29                    Stage 4                     Stage 4        < 15                      Stage 5                     Stage 5  Each stage of CKD assumes that the associated GFR level has been in  effect for at least 3 months. Determination of stages one and two (with  eGFR > 59 ml/min/m2) requires estimation of kidney damage for at least 3  months as defined by structural or functional abnormalities.  Limitations: All estimates of GFR will be less accurate for patients at  extremes of muscle mass (including but not limited to frail elderly,  critically ill, or cancer patients), those with unusual diets, and those  with conditions associated with reduced secretion or extrarenal  elimination of creatinine. The eGFR equation is not recommended for use  in patients with unstable creatinine levels. mL/min/1.73M2    eGFR if African American: 98 mL/min/1.73M2               12.0   11.67 )-----------( 236      ( 2020 10:29 )             37.5               Creatinine Trend: 0.94<--, 0.90<--    Urinalysis Basic - ( 2020 19:24 )    Color: Light Yellow / Appearance: Clear / S.010 / pH: x  Gluc: x / Ketone: Negative  / Bili: Negative / Urobili: Negative   Blood: x / Protein: 30 mg/dL / Nitrite: Negative   Leuk Esterase: Small / RBC: 66 /hpf / WBC 7 /HPF   Sq Epi: x / Non Sq Epi: 2 /hpf / Bacteria: Negative        MICROBIOLOGY:  Culture - Urine (19 @ 15:04)    Specimen Source: .Urine Catheterized    Culture Results:   No growth              RADIOLOGY: Patient is a 64y old  Male who presents with a chief complaint of elective prostate artery embolization (2020 09:03)      HPI:  64yoM w/ PMHx significant for lung cancer s/p B/L lobectomies (chemo/radiation in ), thyroid cancer now w/ hypothyroidism, COPD, LILY (non-compliant w/ CPAP), and BPH (s/p TURP in  and ), who presents for planned prostate artery embolization given multiple recent episodes of urinary retention requiring catheterization, discontinued 1 week ago by outpatient provider, Dr. Neville, who then referred him to IR for this procedure. Procedure was performed on , and patient admitted for monitoring. (2020 15:54)    planned prostate artery embolization given multiple recent episodes of urinary retention; procedure was performed on , and patient admitted for monitoring.   started spiking fevers yesterday  ID consulted for workup and antibiotic management     PAST MEDICAL & SURGICAL HISTORY:  Former smoker  History of colon polyps  Zamudio's esophagus without dysplasia  History of chemotherapy: and radiation   Unspecified cholesteatoma, right ear: 2016  Thyroid nodule  Abdominal hernia  Umbilical hernia  Lung cancer, left: upper lobe   GERD (Gastroesophageal Reflux Disease)  Anxiety  BPH (Benign Prostatic Hyperplasia)  Pneumonia:   LILY (Obstructive Sleep Apnea): does not use CPAP, last used it 15 years ago  Lung Cancer: right upper lobe   COPD (Chronic Obstructive Pulmonary Disease): treated with inhalers  S/P ear surgery: unspecified cholesteatoma - right tympanomastoidectomy 2016  History of lung surgery: 2015 benign lesion @ left lung  History of prostate surgery: TURP  and   H/O thyroidectomy: total 2016  Lung cancer, left: left upper lobe VATS   Mediastinum Neoplasm: S/P mediastinoscopy   S/P Lobectomy of Lung: right upper , [facility]      Allergies  No Known Allergies        ANTIMICROBIALS:  piperacillin/tazobactam IVPB.. 3.375 every 8 hours      MEDICATIONS  (STANDING):    ciprofloxacin   IVPB   200 mL/Hr IV Intermittent (20 @ 16:46)    piperacillin/tazobactam IVPB.   200 mL/Hr IV Intermittent (20 @ 19:30)    piperacillin/tazobactam IVPB..   25 mL/Hr IV Intermittent (20 @ 11:39)   25 mL/Hr IV Intermittent (20 @ 02:59)        OTHER MEDS: MEDICATIONS  (STANDING):  bisacodyl Suppository 10 daily PRN  budesonide 160 MICROgram(s)/formoterol 4.5 MICROgram(s) Inhaler 2 two times a day  diazepam    Tablet 5 every 8 hours PRN  HYDROmorphone  Injectable 1 every 10 minutes PRN  levothyroxine 250 daily  ondansetron Injectable 4 every 6 hours PRN  oxybutynin 5 every 8 hours PRN  pantoprazole    Tablet 40 before breakfast  polyethylene glycol 3350 17 daily PRN  senna 2 at bedtime  tamsulosin 0.4 two times a day      SOCIAL HISTORY:     former smoker, patient lives with his family, able to participate in all ADLs/IADLs independently    FAMILY HISTORY:  Family history of prostate cancer (Sibling)  Family history of pancreatic cancer (Father)  Family history of heart disease (Mother, Sibling)      REVIEW OF SYSTEMS  [  ] ROS unobtainable because:    [x  ] All other systems negative except as noted below:	    Constitutional:  [ x] fever [ ] chills  [ ] weight loss  [ ] weakness  Skin:  [ ] rash [ ] phlebitis	  Eyes: [ ] icterus [ ] pain  [ ] discharge	  ENMT: [ ] sore throat  [ ] thrush [ ] ulcers [ ] exudates  Respiratory: [ ] dyspnea [ ] hemoptysis [ ] cough [ ] sputum	  Cardiovascular:  [ ] chest pain [ ] palpitations [ ] edema	  Gastrointestinal:  [ ] nausea [ x] vomiting [ ] diarrhea [ ] constipation [ ] pain	  Genitourinary:  [ ] dysuria [ ] frequency [ ] hematuria [ ] discharge [ ] flank pain  [ ] incontinence  Musculoskeletal:  [ ] myalgias [ ] arthralgias [ ] arthritis  [ ] back pain  Neurological:  [ ] headache [ ] seizures  [ ] confusion/altered mental status  Psychiatric:  [ ] anxiety [ ] depression	  Hematology/Lymphatics:  [ ] lymphadenopathy  Endocrine:  [ ] adrenal [ ] thyroid  Allergic/Immunologic:	 [ ] transplant [ ] seasonal    Vital Signs Last 24 Hrs  T(F): 98.6 (20 @ 13:27), Max: 102.6 (20 @ 18:38)    Vital Signs Last 24 Hrs  HR: 88 (20 @ 13:27) (88 - 95)  BP: 109/64 (20 @ 13:27) (100/65 - 116/71)  RR: 18 (20 @ 13:27)  SpO2: 97% (20 @ 13:27) (94% - 97%)  Wt(kg): --    PHYSICAL EXAM:  Constitutional: non-toxic, no distress, awake  HEAD/EYES: atraumatic, anicteric, no conjunctival injection  ENT:  supple, no sores, no thrush  Cardiovascular:   normal S1, S2, no murmur, no edema  Respiratory:  equal breath sounds, no wheezes, no rales  GI:  soft, non-tender, normal bowel sounds  :  no bloom, no CVA tenderness  Musculoskeletal:  no synovitis, normal ROM, right groin nontender, no induration redness or warmth  Neurologic: awake and alert,  normal strength, no focal findings  Skin:  no rash, no erythema, no phlebitis  Heme/Onc: no lymphadenopathy   Psychiatric:  awake, alert, appropriate mood          WBC Count: 11.67 K/uL ( @ 10:29)                 Basic Metabolic Panel (12.27.19 @ 17:52)    Sodium, Serum: 141 mmol/L    Potassium, Serum: 3.9 mmol/L    Chloride, Serum: 104 mmol/L    Carbon Dioxide, Serum: 24 mmol/L    Anion Gap, Serum: 13 mmol/L    Blood Urea Nitrogen, Serum: 13 mg/dL    Creatinine, Serum: 0.94 mg/dL    Glucose, Serum: 68 mg/dL    Calcium, Total Serum: 9.0 mg/dL    eGFR if Non : 84: Interpretative comment  The units for eGFR are mL/min/1.73M2 (normalized body surface area). The  eGFR is calculated from a serum creatinine using the CKD-EPI equation.  Other variables required for calculation are race, age and sex. Among  patients with chronic kidney disease (CKD), the eGFR is useful in  determining the stage of disease according to KDOQI CKD classification.  All eGFR results are reported numerically with the following  interpretation.          GFR                    With                 Without     (ml/min/1.73 m2)    Kidney Damage       Kidney Damage        >= 90                    Stage 1                     Normal        60-89                    Stage 2                     Decreased GFR        30-59     Stage 3                     Stage 3        15-29                    Stage 4                     Stage 4        < 15                      Stage 5                     Stage 5  Each stage of CKD assumes that the associated GFR level has been in  effect for at least 3 months. Determination of stages one and two (with  eGFR > 59 ml/min/m2) requires estimation of kidney damage for at least 3  months as defined by structural or functional abnormalities.  Limitations: All estimates of GFR will be less accurate for patients at  extremes of muscle mass (including but not limited to frail elderly,  critically ill, or cancer patients), those with unusual diets, and those  with conditions associated with reduced secretion or extrarenal  elimination of creatinine. The eGFR equation is not recommended for use  in patients with unstable creatinine levels. mL/min/1.73M2    eGFR if African American: 98 mL/min/1.73M2               12.0   11.67 )-----------( 236      ( 2020 10:29 )             37.5               Creatinine Trend: 0.94<--, 0.90<--    Urinalysis Basic - ( 2020 19:24 )    Color: Light Yellow / Appearance: Clear / S.010 / pH: x  Gluc: x / Ketone: Negative  / Bili: Negative / Urobili: Negative   Blood: x / Protein: 30 mg/dL / Nitrite: Negative   Leuk Esterase: Small / RBC: 66 /hpf / WBC 7 /HPF   Sq Epi: x / Non Sq Epi: 2 /hpf / Bacteria: Negative        MICROBIOLOGY:  Culture - Urine (19 @ 15:04)    Specimen Source: .Urine Catheterized    Culture Results:   No growth              RADIOLOGY:

## 2020-01-04 NOTE — PROGRESS NOTE ADULT - SUBJECTIVE AND OBJECTIVE BOX
Urology Progress Note    Overnight Events:  Complaining of bloom pain, spasms    Review of Systems:   Constitutional: No weight loss, no weakness  CV: No chest pain, no chest pressure  Pulm: No shortness of breath, cough, or sputum    Physical Exam:  Vital signs  T(C): 38.1 (20 @ 09:03), Max: 39.2 (20 @ 18:38)  HR: 93 (20 @ 09:03)  BP: 116/71 (20 @ 09:20)  SpO2: 94% (20 @ 09:03)  Wt(kg): --    Gen: No acute distress. Normal mood  Abd: soft, non-tender, non-distended  : Non-palpable bladder    Labs:       @ 10:29    WBC 11.67 / Hct 37.5  / SCr --               Urinalysis Basic - ( 2020 19:24 )    Color: Light Yellow / Appearance: Clear / S.010 / pH: x  Gluc: x / Ketone: Negative  / Bili: Negative / Urobili: Negative   Blood: x / Protein: 30 mg/dL / Nitrite: Negative   Leuk Esterase: Small / RBC: 66 /hpf / WBC 7 /HPF   Sq Epi: x / Non Sq Epi: 2 /hpf / Bacteria: Negative

## 2020-01-04 NOTE — CONSULT NOTE ADULT - ASSESSMENT
64yoM w/ PMHx significant for lung cancer s/p B/L lobectomies (chemo/radiation in 2010), thyroid cancer now w/ hypothyroidism, COPD, LILY (non-compliant w/ CPAP), and BPH (s/p TURP in 2014 and 2017), who presents for planned prostate artery embolization given multiple recent episodes of urinary retention; procedure was performed on 1/2, and patient admitted for monitoring. 64yoM w/ PMHx significant for lung cancer s/p B/L lobectomies (chemo/radiation in 2010), thyroid cancer now w/ hypothyroidism, COPD, LILY (non-compliant w/ CPAP), and BPH (s/p TURP in 2014 and 2017), who presents for planned prostate artery embolization given multiple recent episodes of urinary retention; procedure was performed on 1/2, and patient admitted for monitoring.    Possible post procedure fevers vs infection  Monitor fever curve   Trend CBC with diff  Send Urine culture   f/u blood culture  continue Piperacillin/tazobactam 3.375 grams every 8 hours   plan to stop antibiotics if culture are negative     discussed with NP    Turner Huitron DO  Pager [phone]   ID fellow, PGY 5  After 5pm/weekends call [phone]

## 2020-01-04 NOTE — PROGRESS NOTE ADULT - SUBJECTIVE AND OBJECTIVE BOX
Patient is a 64y old  Male who presents with a chief complaint of Prostate artery embolization (2020 10:32)      SUBJECTIVE / OVERNIGHT EVENTS:    MEDICATIONS  (STANDING):  budesonide 160 MICROgram(s)/formoterol 4.5 MICROgram(s) Inhaler 2 Puff(s) Inhalation two times a day  lactated ringers. 1000 milliLiter(s) (100 mL/Hr) IV Continuous <Continuous>  levothyroxine 250 MICROGram(s) Oral daily  pantoprazole    Tablet 40 milliGRAM(s) Oral before breakfast  piperacillin/tazobactam IVPB.. 3.375 Gram(s) IV Intermittent every 8 hours  senna 2 Tablet(s) Oral at bedtime  tamsulosin 0.4 milliGRAM(s) Oral daily    MEDICATIONS  (PRN):  bisacodyl Suppository 10 milliGRAM(s) Rectal daily PRN Constipation  diazepam    Tablet 5 milliGRAM(s) Oral every 8 hours PRN bladder spasm  HYDROmorphone  Injectable 1 milliGRAM(s) IV Push every 10 minutes PRN Severe Pain (7 - 10)  naloxone Injectable 0.1 milliGRAM(s) IV Push every 3 minutes PRN For ANY of the following changes in patient status:  A. RR LESS THAN 10 breaths per minute, B. Oxygen saturation LESS THAN 90%, C. Sedation score of 6  ondansetron Injectable 4 milliGRAM(s) IV Push every 6 hours PRN Nausea  oxybutynin 5 milliGRAM(s) Oral every 8 hours PRN Bladder spasms  polyethylene glycol 3350 17 Gram(s) Oral daily PRN Constipation      Vital Signs Last 24 Hrs  T(C): 37.8 (2020 06:05), Max: 39.2 (2020 18:38)  T(F): 100.1 (2020 06:05), Max: 102.6 (2020 18:38)  HR: 93 (2020 06:05) (90 - 95)  BP: 111/77 (2020 06:05) (94/68 - 115/77)  BP(mean): --  RR: 17 (2020 06:05) (17 - 18)  SpO2: 94% (2020 06:05) (94% - 96%)  CAPILLARY BLOOD GLUCOSE        I&O's Summary    2020 07:01  -  2020 07:00  --------------------------------------------------------  IN: 3800 mL / OUT: 4025 mL / NET: -225 mL        PHYSICAL EXAM:  GENERAL: NAD, well-developed  HEAD:  Atraumatic, Normocephalic  EYES: EOMI, PERRLA, conjunctiva and sclera clear  NECK: Supple, No JVD  CHEST/LUNG: Clear to auscultation bilaterally; No wheeze  HEART: Regular rate and rhythm; No murmurs, rubs, or gallops  ABDOMEN: Soft, Nontender, Nondistended; Bowel sounds present  EXTREMITIES:  2+ Peripheral Pulses, No clubbing, cyanosis, or edema  PSYCH: AAOx3  NEUROLOGY: non-focal  SKIN: No rashes or lesions    LABS:                Urinalysis Basic - ( 2020 19:24 )    Color: Light Yellow / Appearance: Clear / S.010 / pH: x  Gluc: x / Ketone: Negative  / Bili: Negative / Urobili: Negative   Blood: x / Protein: 30 mg/dL / Nitrite: Negative   Leuk Esterase: Small / RBC: 66 /hpf / WBC 7 /HPF   Sq Epi: x / Non Sq Epi: 2 /hpf / Bacteria: Negative        RADIOLOGY & ADDITIONAL TESTS:    Imaging Personally Reviewed:    Consultant(s) Notes Reviewed:      Care Discussed with Consultants/Other Providers: Patient is a 64y old  Male who presents with a chief complaint of Prostate artery embolization (2020 10:32)      SUBJECTIVE / OVERNIGHT EVENTS: Pt seen and examined. Spiked fever last night and again this morning Tmax 102F. He c/o bladder spasms. Diluadid PCA d/renay. Gaitan catheter removed this AM for TOV per Nephrology reccs.     MEDICATIONS  (STANDING):  budesonide 160 MICROgram(s)/formoterol 4.5 MICROgram(s) Inhaler 2 Puff(s) Inhalation two times a day  lactated ringers. 1000 milliLiter(s) (100 mL/Hr) IV Continuous <Continuous>  levothyroxine 250 MICROGram(s) Oral daily  pantoprazole    Tablet 40 milliGRAM(s) Oral before breakfast  piperacillin/tazobactam IVPB.. 3.375 Gram(s) IV Intermittent every 8 hours  senna 2 Tablet(s) Oral at bedtime  tamsulosin 0.4 milliGRAM(s) Oral daily    MEDICATIONS  (PRN):  bisacodyl Suppository 10 milliGRAM(s) Rectal daily PRN Constipation  diazepam    Tablet 5 milliGRAM(s) Oral every 8 hours PRN bladder spasm  HYDROmorphone  Injectable 1 milliGRAM(s) IV Push every 10 minutes PRN Severe Pain (7 - 10)  naloxone Injectable 0.1 milliGRAM(s) IV Push every 3 minutes PRN For ANY of the following changes in patient status:  A. RR LESS THAN 10 breaths per minute, B. Oxygen saturation LESS THAN 90%, C. Sedation score of 6  ondansetron Injectable 4 milliGRAM(s) IV Push every 6 hours PRN Nausea  oxybutynin 5 milliGRAM(s) Oral every 8 hours PRN Bladder spasms  polyethylene glycol 3350 17 Gram(s) Oral daily PRN Constipation      Vital Signs Last 24 Hrs  T(C): 37.8 (2020 06:05), Max: 39.2 (2020 18:38)  T(F): 100.1 (2020 06:05), Max: 102.6 (2020 18:38)  HR: 93 (2020 06:05) (90 - 95)  BP: 111/77 (2020 06:05) (94/68 - 115/77)  BP(mean): --  RR: 17 (2020 06:05) (17 - 18)  SpO2: 94% (2020 06:05) (94% - 96%)  CAPILLARY BLOOD GLUCOSE        I&O's Summary    2020 07:01  -  2020 07:00  --------------------------------------------------------  IN: 3800 mL / OUT: 4025 mL / NET: -225 mL        PHYSICAL EXAM:  GENERAL: NAD, anicteric, up in chair  HEAD:  Atraumatic, Normocephalic  EYES: EOMI, PERRLA, conjunctiva and sclera clear  NECK: Supple, No JVD  CHEST/LUNG: Clear to auscultation bilaterally; No wheeze  HEART: Regular rate and rhythm; No murmurs, rubs, or gallops  ABDOMEN: Soft, Nontender, Nondistended; Bowel sounds present  EXTREMITIES:  2+ Peripheral Pulses, No clubbing, cyanosis, or edema  PSYCH: AAOx3  NEUROLOGY: non-focal  SKIN: No rashes or lesions    LABS:                            12.0   11.67 )-----------( 236      ( 2020 10:29 )             37.5             Urinalysis Basic - ( 2020 19:24 )    Color: Light Yellow / Appearance: Clear / S.010 / pH: x  Gluc: x / Ketone: Negative  / Bili: Negative / Urobili: Negative   Blood: x / Protein: 30 mg/dL / Nitrite: Negative   Leuk Esterase: Small / RBC: 66 /hpf / WBC 7 /HPF   Sq Epi: x / Non Sq Epi: 2 /hpf / Bacteria: Negative          Consultant(s) Notes Reviewed:  Urology

## 2020-01-04 NOTE — CONSULT NOTE ADULT - ATTENDING COMMENTS
possible post procedure infection, but I doubt it.  Far more likely that this is a post prostate artery embolization syndrome with fevers, spasms---described in med lit.  Can continue abx for 24 hours pending cultures, but hopefully stop soon.  Fevers from this may persist several days.  OK to give antipyretics.

## 2020-01-04 NOTE — PROGRESS NOTE ADULT - PROBLEM SELECTOR PLAN 1
- s/p prostate artery embolization performed by IR ; well tolerated  - bloom placed overnight for retention  - c/w ditropan for bladder spasms   -Urology reccs noted  - bowel regimen for constipation  - PCA dump d/renay ; will start Tylenol for pain control

## 2020-01-05 LAB
CULTURE RESULTS: NO GROWTH — SIGNIFICANT CHANGE UP
HCT VFR BLD CALC: 35.9 % — LOW (ref 39–50)
HGB BLD-MCNC: 11.5 G/DL — LOW (ref 13–17)
MCHC RBC-ENTMCNC: 27.8 PG — SIGNIFICANT CHANGE UP (ref 27–34)
MCHC RBC-ENTMCNC: 32 GM/DL — SIGNIFICANT CHANGE UP (ref 32–36)
MCV RBC AUTO: 86.7 FL — SIGNIFICANT CHANGE UP (ref 80–100)
NRBC # BLD: 0 /100 WBCS — SIGNIFICANT CHANGE UP (ref 0–0)
PLATELET # BLD AUTO: 233 K/UL — SIGNIFICANT CHANGE UP (ref 150–400)
RBC # BLD: 4.14 M/UL — LOW (ref 4.2–5.8)
RBC # FLD: 12.8 % — SIGNIFICANT CHANGE UP (ref 10.3–14.5)
SPECIMEN SOURCE: SIGNIFICANT CHANGE UP
WBC # BLD: 10.26 K/UL — SIGNIFICANT CHANGE UP (ref 3.8–10.5)
WBC # FLD AUTO: 10.26 K/UL — SIGNIFICANT CHANGE UP (ref 3.8–10.5)

## 2020-01-05 PROCEDURE — 99232 SBSQ HOSP IP/OBS MODERATE 35: CPT

## 2020-01-05 PROCEDURE — 99233 SBSQ HOSP IP/OBS HIGH 50: CPT

## 2020-01-05 RX ORDER — DIAZEPAM 5 MG
5 TABLET ORAL EVERY 12 HOURS
Refills: 0 | Status: DISCONTINUED | OUTPATIENT
Start: 2020-01-05 | End: 2020-01-06

## 2020-01-05 RX ADMIN — Medication 650 MILLIGRAM(S): at 14:00

## 2020-01-05 RX ADMIN — Medication 250 MICROGRAM(S): at 05:37

## 2020-01-05 RX ADMIN — TAMSULOSIN HYDROCHLORIDE 0.4 MILLIGRAM(S): 0.4 CAPSULE ORAL at 05:37

## 2020-01-05 RX ADMIN — BUDESONIDE AND FORMOTEROL FUMARATE DIHYDRATE 2 PUFF(S): 160; 4.5 AEROSOL RESPIRATORY (INHALATION) at 21:13

## 2020-01-05 RX ADMIN — Medication 5 MILLIGRAM(S): at 21:13

## 2020-01-05 RX ADMIN — PIPERACILLIN AND TAZOBACTAM 25 GRAM(S): 4; .5 INJECTION, POWDER, LYOPHILIZED, FOR SOLUTION INTRAVENOUS at 02:14

## 2020-01-05 RX ADMIN — SODIUM CHLORIDE 100 MILLILITER(S): 9 INJECTION, SOLUTION INTRAVENOUS at 02:13

## 2020-01-05 RX ADMIN — SENNA PLUS 2 TABLET(S): 8.6 TABLET ORAL at 21:13

## 2020-01-05 RX ADMIN — Medication 5 MILLIGRAM(S): at 05:36

## 2020-01-05 RX ADMIN — Medication 650 MILLIGRAM(S): at 21:13

## 2020-01-05 RX ADMIN — Medication 5 MILLIGRAM(S): at 02:13

## 2020-01-05 RX ADMIN — PANTOPRAZOLE SODIUM 40 MILLIGRAM(S): 20 TABLET, DELAYED RELEASE ORAL at 05:37

## 2020-01-05 RX ADMIN — Medication 650 MILLIGRAM(S): at 21:45

## 2020-01-05 RX ADMIN — BUDESONIDE AND FORMOTEROL FUMARATE DIHYDRATE 2 PUFF(S): 160; 4.5 AEROSOL RESPIRATORY (INHALATION) at 05:37

## 2020-01-05 RX ADMIN — Medication 650 MILLIGRAM(S): at 13:09

## 2020-01-05 RX ADMIN — TAMSULOSIN HYDROCHLORIDE 0.4 MILLIGRAM(S): 0.4 CAPSULE ORAL at 21:13

## 2020-01-05 NOTE — PROGRESS NOTE ADULT - PROBLEM SELECTOR PLAN 1
- s/p prostate artery embolization performed by IR ; well tolerated  - still in urinary retention ; second TOV in progress today  - will d/c  ditropan and taper valium per Urology reccs  -c/w Flomax 0.4 mg po bid  - bowel regimen for constipation  - c/w Tylenol prn

## 2020-01-05 NOTE — PROGRESS NOTE ADULT - SUBJECTIVE AND OBJECTIVE BOX
patient now comfortable and voided 200cc with < 299cc pvr clear urine   but yesterday without adequate flow and straight cath for 1000cc/    +bm  vss afeb  abd soft nd nt   urine clear ( urinal)

## 2020-01-05 NOTE — PROGRESS NOTE ADULT - SUBJECTIVE AND OBJECTIVE BOX
Patient is a 64y old  Male who presents with a chief complaint of elective prostate artery embolization (2020 09:03)      SUBJECTIVE / OVERNIGHT EVENTS:    MEDICATIONS  (STANDING):  budesonide 160 MICROgram(s)/formoterol 4.5 MICROgram(s) Inhaler 2 Puff(s) Inhalation two times a day  lactated ringers. 1000 milliLiter(s) (100 mL/Hr) IV Continuous <Continuous>  levothyroxine 250 MICROGram(s) Oral daily  pantoprazole    Tablet 40 milliGRAM(s) Oral before breakfast  senna 2 Tablet(s) Oral at bedtime  tamsulosin 0.4 milliGRAM(s) Oral two times a day    MEDICATIONS  (PRN):  acetaminophen   Tablet .. 650 milliGRAM(s) Oral every 6 hours PRN Mild Pain (1 - 3), Moderate Pain (4 - 6)  bisacodyl Suppository 10 milliGRAM(s) Rectal daily PRN Constipation  diazepam    Tablet 5 milliGRAM(s) Oral every 8 hours PRN bladder spasm  HYDROmorphone  Injectable 1 milliGRAM(s) IV Push every 10 minutes PRN Severe Pain (7 - 10)  naloxone Injectable 0.1 milliGRAM(s) IV Push every 3 minutes PRN For ANY of the following changes in patient status:  A. RR LESS THAN 10 breaths per minute, B. Oxygen saturation LESS THAN 90%, C. Sedation score of 6  ondansetron Injectable 4 milliGRAM(s) IV Push every 6 hours PRN Nausea  oxybutynin 5 milliGRAM(s) Oral every 8 hours PRN Bladder spasms  polyethylene glycol 3350 17 Gram(s) Oral daily PRN Constipation      Vital Signs Last 24 Hrs  T(C): 36.9 (2020 09:11), Max: 37.6 (2020 21:03)  T(F): 98.4 (2020 09:11), Max: 99.7 (2020 21:03)  HR: 82 (2020 09:11) (82 - 93)  BP: 102/62 (2020 09:11) (102/62 - 116/71)  BP(mean): --  RR: 18 (2020 09:11) (17 - 18)  SpO2: 94% (2020 09:11) (93% - 97%)  CAPILLARY BLOOD GLUCOSE        I&O's Summary    2020 07:01  -  2020 07:00  --------------------------------------------------------  IN: 3960 mL / OUT: 4435 mL / NET: -475 mL    2020 07:01  -  2020 09:14  --------------------------------------------------------  IN: 200 mL / OUT: 1000 mL / NET: -800 mL        PHYSICAL EXAM:  GENERAL: NAD, well-developed  HEAD:  Atraumatic, Normocephalic  EYES: EOMI, PERRLA, conjunctiva and sclera clear  NECK: Supple, No JVD  CHEST/LUNG: Clear to auscultation bilaterally; No wheeze  HEART: Regular rate and rhythm; No murmurs, rubs, or gallops  ABDOMEN: Soft, Nontender, Nondistended; Bowel sounds present  EXTREMITIES:  2+ Peripheral Pulses, No clubbing, cyanosis, or edema  PSYCH: AAOx3  NEUROLOGY: non-focal  SKIN: No rashes or lesions    LABS:                        11.5   10.26 )-----------( 233      ( 2020 06:52 )             35.9                 Urinalysis Basic - ( 2020 19:24 )    Color: Light Yellow / Appearance: Clear / S.010 / pH: x  Gluc: x / Ketone: Negative  / Bili: Negative / Urobili: Negative   Blood: x / Protein: 30 mg/dL / Nitrite: Negative   Leuk Esterase: Small / RBC: 66 /hpf / WBC 7 /HPF   Sq Epi: x / Non Sq Epi: 2 /hpf / Bacteria: Negative        RADIOLOGY & ADDITIONAL TESTS:    Imaging Personally Reviewed:    Consultant(s) Notes Reviewed:      Care Discussed with Consultants/Other Providers: Patient is a 64y old  Male who presents with a chief complaint of elective prostate artery embolization (2020 09:03)      SUBJECTIVE / OVERNIGHT EVENTS:  Pt seen and examined. Failed TOV yesterday ; PVR was 700cc this am with straight cath 1L. Pt reports improvement in bladder spasm.   No fever spikes overnight. Seen by ID , Urology ; reccs noted.    MEDICATIONS  (STANDING):  budesonide 160 MICROgram(s)/formoterol 4.5 MICROgram(s) Inhaler 2 Puff(s) Inhalation two times a day  lactated ringers. 1000 milliLiter(s) (100 mL/Hr) IV Continuous <Continuous>  levothyroxine 250 MICROGram(s) Oral daily  pantoprazole    Tablet 40 milliGRAM(s) Oral before breakfast  senna 2 Tablet(s) Oral at bedtime  tamsulosin 0.4 milliGRAM(s) Oral two times a day    MEDICATIONS  (PRN):  acetaminophen   Tablet .. 650 milliGRAM(s) Oral every 6 hours PRN Mild Pain (1 - 3), Moderate Pain (4 - 6)  bisacodyl Suppository 10 milliGRAM(s) Rectal daily PRN Constipation  diazepam    Tablet 5 milliGRAM(s) Oral every 8 hours PRN bladder spasm  HYDROmorphone  Injectable 1 milliGRAM(s) IV Push every 10 minutes PRN Severe Pain (7 - 10)  naloxone Injectable 0.1 milliGRAM(s) IV Push every 3 minutes PRN For ANY of the following changes in patient status:  A. RR LESS THAN 10 breaths per minute, B. Oxygen saturation LESS THAN 90%, C. Sedation score of 6  ondansetron Injectable 4 milliGRAM(s) IV Push every 6 hours PRN Nausea  oxybutynin 5 milliGRAM(s) Oral every 8 hours PRN Bladder spasms  polyethylene glycol 3350 17 Gram(s) Oral daily PRN Constipation      Vital Signs Last 24 Hrs  T(C): 36.9 (2020 09:11), Max: 37.6 (2020 21:03)  T(F): 98.4 (2020 09:11), Max: 99.7 (2020 21:03)  HR: 82 (2020 09:11) (82 - 93)  BP: 102/62 (2020 09:11) (102/62 - 116/71)  BP(mean): --  RR: 18 (2020 09:11) (17 - 18)  SpO2: 94% (2020 09:11) (93% - 97%)  CAPILLARY BLOOD GLUCOSE        I&O's Summary    2020 07:01  -  2020 07:00  --------------------------------------------------------  IN: 3960 mL / OUT: 4435 mL / NET: -475 mL    2020 07:01  -  2020 09:14  --------------------------------------------------------  IN: 200 mL / OUT: 1000 mL / NET: -800 mL        PHYSICAL EXAM:  GENERAL: NAD, anicteric, up in chair  HEAD:  Atraumatic, Normocephalic  EYES: EOMI, PERRLA, conjunctiva and sclera clear  NECK: Supple, No JVD  CHEST/LUNG: Clear to auscultation bilaterally; No wheeze  HEART: Regular rate and rhythm; No murmurs, rubs, or gallops  ABDOMEN: Soft, Nontender, Nondistended; Bowel sounds present  EXTREMITIES:  2+ Peripheral Pulses, No clubbing, cyanosis, or edema  PSYCH: AAOx3  NEUROLOGY: non-focal  SKIN: No rashes or lesions    LABS:                        11.5   10.26 )-----------( 233      ( 2020 06:52 )             35.9                 Urinalysis Basic - ( 2020 19:24 )    Color: Light Yellow / Appearance: Clear / S.010 / pH: x  Gluc: x / Ketone: Negative  / Bili: Negative / Urobili: Negative   Blood: x / Protein: 30 mg/dL / Nitrite: Negative   Leuk Esterase: Small / RBC: 66 /hpf / WBC 7 /HPF   Sq Epi: x / Non Sq Epi: 2 /hpf / Bacteria: Negative        Consultant(s) Notes Reviewed:  ID , Urology    Care Discussed with Consultants/Other Providers:  ID

## 2020-01-05 NOTE — PROGRESS NOTE ADULT - SUBJECTIVE AND OBJECTIVE BOX
INFECTIOUS DISEASES FOLLOW UP--Lonre Rangel MD  Pager [phone]    This is a follow up note for this  64y Male with  post prostate artery embolization- syndrome of fevers.  retention overnight.    tolerating zosyn  blood cx neg    Further ROS:  CONSTITUTIONAL:  No fever, good appetite  CARDIOVASCULAR:  No chest pain or palpitations  RESPIRATORY:  No dyspnea  GASTROINTESTINAL:  No nausea, vomiting, diarrhea, or abdominal pain  GENITOURINARY:  No dysuria  NEUROLOGIC:  No headache,     Allergies  No Known Allergies    ANTIBIOTICS/RELEVANT:  antimicrobials  piperacillin/tazobactam IVPB.. 3.375 Gram(s) IV Intermittent every 8 hours    OTHER:  acetaminophen   Tablet .. 650 milliGRAM(s) Oral every 6 hours PRN  bisacodyl Suppository 10 milliGRAM(s) Rectal daily PRN  budesonide 160 MICROgram(s)/formoterol 4.5 MICROgram(s) Inhaler 2 Puff(s) Inhalation two times a day  diazepam    Tablet 5 milliGRAM(s) Oral every 8 hours PRN  HYDROmorphone  Injectable 1 milliGRAM(s) IV Push every 10 minutes PRN  lactated ringers. 1000 milliLiter(s) IV Continuous <Continuous>  levothyroxine 250 MICROGram(s) Oral daily  naloxone Injectable 0.1 milliGRAM(s) IV Push every 3 minutes PRN  ondansetron Injectable 4 milliGRAM(s) IV Push every 6 hours PRN  oxybutynin 5 milliGRAM(s) Oral every 8 hours PRN  pantoprazole    Tablet 40 milliGRAM(s) Oral before breakfast  polyethylene glycol 3350 17 Gram(s) Oral daily PRN  senna 2 Tablet(s) Oral at bedtime  tamsulosin 0.4 milliGRAM(s) Oral two times a day    Objective:  Vital Signs Last 24 Hrs  T(C): 37 (2020 05:05), Max: 38.1 (2020 09:03)  T(F): 98.6 (2020 05:05), Max: 100.5 (2020 09:03)  HR: 88 (2020 05:05) (85 - 93)  BP: 105/68 (2020 05:05) (100/65 - 116/71)  BP(mean): --  RR: 18 (2020 05:05) (17 - 18)  SpO2: 93% (2020 05:05) (93% - 97%)    PHYSICAL EXAM:  Constitutional:no acute distress  Ear/Nose/Throat: no oral lesions, 	  Respiratory: clear BL  Cardiovascular: S1S2  Gastrointestinal:soft, (+) BS, no tenderness  Extremities:no e/e/c  No Lymphadenopathy  IV sites not inflammed.    LABS:                        11.5   10.26 )-----------( 233      ( 2020 06:52 )             35.9     Urinalysis Basic - ( 2020 19:24 )    Color: Light Yellow / Appearance: Clear / S.010 / pH: x  Gluc: x / Ketone: Negative  / Bili: Negative / Urobili: Negative   Blood: x / Protein: 30 mg/dL / Nitrite: Negative   Leuk Esterase: Small / RBC: 66 /hpf / WBC 7 /HPF   Sq Epi: x / Non Sq Epi: 2 /hpf / Bacteria: Negative    MICROBIOLOGY: bc negative    RADIOLOGY & ADDITIONAL STUDIES:    < from: Xray Chest 1 View- PORTABLE-Urgent (20 @ 21:08) >    IMPRESSION:   No focal consolidations.  No change from prior studies.      < end of copied text >

## 2020-01-06 ENCOUNTER — TRANSCRIPTION ENCOUNTER (OUTPATIENT)
Age: 65
End: 2020-01-06

## 2020-01-06 VITALS
SYSTOLIC BLOOD PRESSURE: 122 MMHG | DIASTOLIC BLOOD PRESSURE: 78 MMHG | OXYGEN SATURATION: 97 % | HEART RATE: 77 BPM | RESPIRATION RATE: 17 BRPM | TEMPERATURE: 98 F

## 2020-01-06 LAB
ANION GAP SERPL CALC-SCNC: 9 MMOL/L — SIGNIFICANT CHANGE UP (ref 5–17)
BUN SERPL-MCNC: 9 MG/DL — SIGNIFICANT CHANGE UP (ref 7–23)
CALCIUM SERPL-MCNC: 9.3 MG/DL — SIGNIFICANT CHANGE UP (ref 8.4–10.5)
CHLORIDE SERPL-SCNC: 102 MMOL/L — SIGNIFICANT CHANGE UP (ref 96–108)
CO2 SERPL-SCNC: 27 MMOL/L — SIGNIFICANT CHANGE UP (ref 22–31)
CREAT SERPL-MCNC: 0.78 MG/DL — SIGNIFICANT CHANGE UP (ref 0.5–1.3)
GLUCOSE SERPL-MCNC: 95 MG/DL — SIGNIFICANT CHANGE UP (ref 70–99)
HCT VFR BLD CALC: 37 % — LOW (ref 39–50)
HGB BLD-MCNC: 11.5 G/DL — LOW (ref 13–17)
MCHC RBC-ENTMCNC: 27.3 PG — SIGNIFICANT CHANGE UP (ref 27–34)
MCHC RBC-ENTMCNC: 31.1 GM/DL — LOW (ref 32–36)
MCV RBC AUTO: 87.7 FL — SIGNIFICANT CHANGE UP (ref 80–100)
NRBC # BLD: 0 /100 WBCS — SIGNIFICANT CHANGE UP (ref 0–0)
PLATELET # BLD AUTO: 251 K/UL — SIGNIFICANT CHANGE UP (ref 150–400)
POTASSIUM SERPL-MCNC: 3.6 MMOL/L — SIGNIFICANT CHANGE UP (ref 3.5–5.3)
POTASSIUM SERPL-SCNC: 3.6 MMOL/L — SIGNIFICANT CHANGE UP (ref 3.5–5.3)
RBC # BLD: 4.22 M/UL — SIGNIFICANT CHANGE UP (ref 4.2–5.8)
RBC # FLD: 12.8 % — SIGNIFICANT CHANGE UP (ref 10.3–14.5)
SODIUM SERPL-SCNC: 138 MMOL/L — SIGNIFICANT CHANGE UP (ref 135–145)
WBC # BLD: 6.82 K/UL — SIGNIFICANT CHANGE UP (ref 3.8–10.5)
WBC # FLD AUTO: 6.82 K/UL — SIGNIFICANT CHANGE UP (ref 3.8–10.5)

## 2020-01-06 PROCEDURE — 99232 SBSQ HOSP IP/OBS MODERATE 35: CPT

## 2020-01-06 PROCEDURE — 80048 BASIC METABOLIC PNL TOTAL CA: CPT

## 2020-01-06 PROCEDURE — 37243 VASC EMBOLIZE/OCCLUDE ORGAN: CPT

## 2020-01-06 PROCEDURE — C1889: CPT

## 2020-01-06 PROCEDURE — 36247 INS CATH ABD/L-EXT ART 3RD: CPT | Mod: 59

## 2020-01-06 PROCEDURE — 76380 CAT SCAN FOLLOW-UP STUDY: CPT | Mod: XU

## 2020-01-06 PROCEDURE — 85027 COMPLETE CBC AUTOMATED: CPT

## 2020-01-06 PROCEDURE — C1760: CPT

## 2020-01-06 PROCEDURE — 94640 AIRWAY INHALATION TREATMENT: CPT

## 2020-01-06 PROCEDURE — 86803 HEPATITIS C AB TEST: CPT

## 2020-01-06 PROCEDURE — 76937 US GUIDE VASCULAR ACCESS: CPT

## 2020-01-06 PROCEDURE — C1894: CPT

## 2020-01-06 PROCEDURE — 99239 HOSP IP/OBS DSCHRG MGMT >30: CPT

## 2020-01-06 PROCEDURE — 87040 BLOOD CULTURE FOR BACTERIA: CPT

## 2020-01-06 PROCEDURE — C1887: CPT

## 2020-01-06 PROCEDURE — C1769: CPT

## 2020-01-06 PROCEDURE — 81001 URINALYSIS AUTO W/SCOPE: CPT

## 2020-01-06 PROCEDURE — 71045 X-RAY EXAM CHEST 1 VIEW: CPT

## 2020-01-06 PROCEDURE — 87086 URINE CULTURE/COLONY COUNT: CPT

## 2020-01-06 RX ORDER — SENNA PLUS 8.6 MG/1
2 TABLET ORAL
Qty: 0 | Refills: 0 | DISCHARGE
Start: 2020-01-06

## 2020-01-06 RX ORDER — TAMSULOSIN HYDROCHLORIDE 0.4 MG/1
1 CAPSULE ORAL
Qty: 0 | Refills: 0 | DISCHARGE
Start: 2020-01-06

## 2020-01-06 RX ORDER — ALPRAZOLAM 0.25 MG
0 TABLET ORAL
Qty: 0 | Refills: 0 | DISCHARGE

## 2020-01-06 RX ADMIN — TAMSULOSIN HYDROCHLORIDE 0.4 MILLIGRAM(S): 0.4 CAPSULE ORAL at 05:06

## 2020-01-06 RX ADMIN — Medication 650 MILLIGRAM(S): at 05:06

## 2020-01-06 RX ADMIN — BUDESONIDE AND FORMOTEROL FUMARATE DIHYDRATE 2 PUFF(S): 160; 4.5 AEROSOL RESPIRATORY (INHALATION) at 05:06

## 2020-01-06 RX ADMIN — Medication 650 MILLIGRAM(S): at 05:51

## 2020-01-06 RX ADMIN — Medication 250 MICROGRAM(S): at 05:06

## 2020-01-06 RX ADMIN — PANTOPRAZOLE SODIUM 40 MILLIGRAM(S): 20 TABLET, DELAYED RELEASE ORAL at 05:06

## 2020-01-06 NOTE — DISCHARGE NOTE PROVIDER - HOSPITAL COURSE
64yoM w/ PMHx significant for lung cancer s/p B/L lobectomies (chemo/radiation in 2010), thyroid cancer now w/ hypothyroidism, COPD, LILY (non-compliant w/ CPAP), and BPH (s/p TURP in 2014 and 2017), who presents for planned prostate artery embolization given multiple recent episodes of urinary retention; procedure was performed on 1/2, and patient admitted for monitoring.            Benign prostatic hyperplasia with urinary retention s/p prostate artery embolization performed by IR and well tolerated. Required two TOV's with resolution of urinary retention prior to D/C. Ditropan D/C'd and Valium D'C prior to discharge with resolution of bladder spasms. c/w Flomax 0.4 mg po bid            Fever with no additional spikes, seen by ID with likely fevers post procedural    - bld cx NGTD, UA not suspicious for UTI, CXR negative    - Zosyn d/renay per ID reccs.          Stable for discharge home with Follow up with PMD Dr Pradeep Barajas within 1 week and Urologist Dr Gary Goldberg within 1 week.

## 2020-01-06 NOTE — PROGRESS NOTE ADULT - PROBLEM SELECTOR PLAN 5
- continue home Synthroid 250mcg daily

## 2020-01-06 NOTE — DISCHARGE NOTE PROVIDER - NSDCMRMEDTOKEN_GEN_ALL_CORE_FT
fluticasone:  inhaled 2 times a day  levothyroxine 125 mcg (0.125 mg) oral capsule: 2 cap(s) orally once a day  omeprazole 20 mg oral delayed release capsule: 1 cap(s) orally 2 times a day  senna oral tablet: 2 tab(s) orally once a day (at bedtime)  Symbicort 160 mcg-4.5 mcg/inh inhalation aerosol: 2 puff(s) inhaled 2 times a day  tamsulosin 0.4 mg oral capsule: 1 cap(s) orally 2 times a day  Vitamin D3 2000 intl units oral capsule: 2 tab(s) orally once a day

## 2020-01-06 NOTE — PROGRESS NOTE ADULT - PROBLEM SELECTOR PLAN 3
- pt is noncompliant w/ CPAP at night  - continue to monitor SpO2 (goal between 88-92%) overnight
- former smoker  - maintain SpO2 between 88-92% careful to avoid over-oxygenation

## 2020-01-06 NOTE — PROGRESS NOTE ADULT - PROBLEM SELECTOR PROBLEM 4
Hypothyroidism
LILY (obstructive sleep apnea)

## 2020-01-06 NOTE — PROGRESS NOTE ADULT - SUBJECTIVE AND OBJECTIVE BOX
64y Male s/p PAE on 1/2 in Interventional Radiology with Dr Ortiz.     Patient seen and examined @ bedside. Access site without bleeding, ecchymosis, hematoma. 2+ CFA/ PT / DP.  No complaints offered. The pt states he feels markedly better today, denies abdominal/pelvic pain, hematuria. Afebrile with stable vital signs, voiding qs - 820cc thus far today. The pt is anxious to go home today now that he feels better.    T(F): 98.4 (01-06-20 @ 08:48), Max: 98.5 (01-05-20 @ 18:46)  HR: 75 (01-06-20 @ 08:48) (72 - 86)  BP: 116/75 (01-06-20 @ 08:48) (103/70 - 121/81)  RR: 17 (01-06-20 @ 08:48) (16 - 18)  SpO2: 95% (01-06-20 @ 08:48) (95% - 97%)  Wt(kg): --    LABS:                        11.5   6.82  )-----------( 251      ( 06 Jan 2020 06:45 )             37.0     01-06    138  |  102  |  9   ----------------------------<  95  3.6   |  27  |  0.78    Ca    9.3      06 Jan 2020 06:44        I&O's Detail    05 Jan 2020 07:01  -  06 Jan 2020 07:00  --------------------------------------------------------  IN:    lactated ringers.: 2400 mL    Oral Fluid: 1080 mL  Total IN: 3480 mL    OUT:    Intermittent Catheterization - Urethral: 1000 mL    Voided: 2665 mL  Total OUT: 3665 mL    Total NET: -185 mL      06 Jan 2020 07:01  -  06 Jan 2020 10:14  --------------------------------------------------------  IN:  Total IN: 0 mL    OUT:    Voided: 300 mL  Total OUT: 300 mL    Total NET: -300 mL            PHYSICAL EXAM:  General: Nontoxic, in NAD  Neuro:  Alert & oriented x 3  Abdomen: soft, NTND.  Extremities: no pedal edema or calf tenderness noted.         Impression: 64y Male admitted with Hematuria, s/p PAE on 1/2/20 - stable s/p PAE - voiding qs, negative blood cxs x 2, stable vital signs, tolerating oral intake, without pain, labs wnl. Transitory fever last week was likely due to post embolization syndrome.    PMH Former smoker  History of colon polyps  Zamudio's esophagus without dysplasia  History of chemotherapy  Unspecified cholesteatoma, right ear  Thyroid nodule  Abdominal hernia  Umbilical hernia  Lung cancer, left  GERD (Gastroesophageal Reflux Disease)  Anxiety  BPH (Benign Prostatic Hyperplasia)  Pneumonia  LILY (Obstructive Sleep Apnea)  Lung Cancer  COPD (Chronic Obstructive Pulmonary Disease)      Plan:  -continue to monitor urinary output  -trend vitals, labs  -Discharge planning pending medicine & urology evaluation - from an IR standpoint, patient meets discharge criteria, as discussed with Dr Ortiz  -Patient to schedule post PAE consult with Dr Ortiz in 4 weeks - first week of February - via IR booking office @ [phone]  - the pt & wife requested to schedule appt once he is home as opposed to scheduling it today at bedside  -will update IR team re pt's current status     Please call IR at extension [phone] with any questions, concerns, or issues regarding above.      Sarah APARICIO BC  ext [phone]  # [phone]

## 2020-01-06 NOTE — PROGRESS NOTE ADULT - PROBLEM SELECTOR PROBLEM 2
COPD (chronic obstructive pulmonary disease)
Fever, unspecified fever cause

## 2020-01-06 NOTE — DISCHARGE NOTE NURSING/CASE MANAGEMENT/SOCIAL WORK - PATIENT PORTAL LINK FT
You can access the FollowMyHealth Patient Portal offered by [facility] by registering at the following website: [email or web address]. By joining [email or web address]’s FollowMyHealth portal, you will also be able to view your health information using other applications (apps) compatible with our system.

## 2020-01-06 NOTE — DISCHARGE NOTE PROVIDER - PROVIDER TOKENS
PROVIDER:[TOKEN:[[id number]],FOLLOWUP:[1 week]],PROVIDER:[TOKEN:[[id number]],FOLLOWUP:[1 week]]

## 2020-01-06 NOTE — DISCHARGE NOTE PROVIDER - NSDCCPCAREPLAN_GEN_ALL_CORE_FT
PRINCIPAL DISCHARGE DIAGNOSIS  Diagnosis: Enlarged prostate with urinary retention  Assessment and Plan of Treatment: You have had a prostate artery embolization due to an enlarged prostate causing urinary retention. You need to follow up with your Urologist Dr Goldberg within 1 week. Call the offcie for an appointment. Follow up with your Primary MD within 1 week also.

## 2020-01-06 NOTE — PROGRESS NOTE ADULT - PROBLEM SELECTOR PROBLEM 1
Benign prostatic hyperplasia with urinary retention

## 2020-01-06 NOTE — DISCHARGE NOTE PROVIDER - NSDCFUSCHEDAPPT_GEN_ALL_CORE_FT
MERA BARBOSA ; 01/13/2020 ; XIMENA Dacostaed [address] [facility]  MERA BARBOSA ; 01/13/2020 ; XIMENA Chan [address] [facility]

## 2020-01-06 NOTE — PROGRESS NOTE ADULT - SUBJECTIVE AND OBJECTIVE BOX
Patient is a 64y old  Male who presents with a chief complaint of retention (05 Jan 2020 12:20)      SUBJECTIVE / OVERNIGHT EVENTS:    MEDICATIONS  (STANDING):  budesonide 160 MICROgram(s)/formoterol 4.5 MICROgram(s) Inhaler 2 Puff(s) Inhalation two times a day  lactated ringers. 1000 milliLiter(s) (100 mL/Hr) IV Continuous <Continuous>  levothyroxine 250 MICROGram(s) Oral daily  pantoprazole    Tablet 40 milliGRAM(s) Oral before breakfast  senna 2 Tablet(s) Oral at bedtime  tamsulosin 0.4 milliGRAM(s) Oral two times a day    MEDICATIONS  (PRN):  acetaminophen   Tablet .. 650 milliGRAM(s) Oral every 6 hours PRN Mild Pain (1 - 3), Moderate Pain (4 - 6)  bisacodyl Suppository 10 milliGRAM(s) Rectal daily PRN Constipation  diazepam    Tablet 5 milliGRAM(s) Oral every 12 hours PRN bladder spasm  HYDROmorphone  Injectable 1 milliGRAM(s) IV Push every 10 minutes PRN Severe Pain (7 - 10)  naloxone Injectable 0.1 milliGRAM(s) IV Push every 3 minutes PRN For ANY of the following changes in patient status:  A. RR LESS THAN 10 breaths per minute, B. Oxygen saturation LESS THAN 90%, C. Sedation score of 6  ondansetron Injectable 4 milliGRAM(s) IV Push every 6 hours PRN Nausea  polyethylene glycol 3350 17 Gram(s) Oral daily PRN Constipation      Vital Signs Last 24 Hrs  T(C): 36.9 (06 Jan 2020 08:48), Max: 36.9 (05 Jan 2020 18:46)  T(F): 98.4 (06 Jan 2020 08:48), Max: 98.5 (05 Jan 2020 18:46)  HR: 75 (06 Jan 2020 08:48) (72 - 86)  BP: 116/75 (06 Jan 2020 08:48) (103/70 - 121/81)  BP(mean): --  RR: 17 (06 Jan 2020 08:48) (16 - 18)  SpO2: 95% (06 Jan 2020 08:48) (95% - 97%)  CAPILLARY BLOOD GLUCOSE        I&O's Summary    05 Jan 2020 07:01  -  06 Jan 2020 07:00  --------------------------------------------------------  IN: 3480 mL / OUT: 3665 mL / NET: -185 mL    06 Jan 2020 07:01  -  06 Jan 2020 09:11  --------------------------------------------------------  IN: 0 mL / OUT: 300 mL / NET: -300 mL        PHYSICAL EXAM:  GENERAL: NAD, well-developed  HEAD:  Atraumatic, Normocephalic  EYES: EOMI, PERRLA, conjunctiva and sclera clear  NECK: Supple, No JVD  CHEST/LUNG: Clear to auscultation bilaterally; No wheeze  HEART: Regular rate and rhythm; No murmurs, rubs, or gallops  ABDOMEN: Soft, Nontender, Nondistended; Bowel sounds present  EXTREMITIES:  2+ Peripheral Pulses, No clubbing, cyanosis, or edema  PSYCH: AAOx3  NEUROLOGY: non-focal  SKIN: No rashes or lesions    LABS:                        11.5   6.82  )-----------( 251      ( 06 Jan 2020 06:45 )             37.0     01-06    138  |  102  |  9   ----------------------------<  95  3.6   |  27  |  0.78    Ca    9.3      06 Jan 2020 06:44                RADIOLOGY & ADDITIONAL TESTS:    Imaging Personally Reviewed:    Consultant(s) Notes Reviewed:      Care Discussed with Consultants/Other Providers: Patient is a 64y old  Male who presents with a chief complaint of retention (05 Jan 2020 12:20)      SUBJECTIVE / OVERNIGHT EVENTS:  Pt seen and examined with family at bedside. No acute events overnight. Denies abd pain; notes improvement in bladder spasms. Bladder scan done this AM with PVR 134cc. Pt reports voiding well.    MEDICATIONS  (STANDING):  budesonide 160 MICROgram(s)/formoterol 4.5 MICROgram(s) Inhaler 2 Puff(s) Inhalation two times a day  lactated ringers. 1000 milliLiter(s) (100 mL/Hr) IV Continuous <Continuous>  levothyroxine 250 MICROGram(s) Oral daily  pantoprazole    Tablet 40 milliGRAM(s) Oral before breakfast  senna 2 Tablet(s) Oral at bedtime  tamsulosin 0.4 milliGRAM(s) Oral two times a day    MEDICATIONS  (PRN):  acetaminophen   Tablet .. 650 milliGRAM(s) Oral every 6 hours PRN Mild Pain (1 - 3), Moderate Pain (4 - 6)  bisacodyl Suppository 10 milliGRAM(s) Rectal daily PRN Constipation  diazepam    Tablet 5 milliGRAM(s) Oral every 12 hours PRN bladder spasm  HYDROmorphone  Injectable 1 milliGRAM(s) IV Push every 10 minutes PRN Severe Pain (7 - 10)  naloxone Injectable 0.1 milliGRAM(s) IV Push every 3 minutes PRN For ANY of the following changes in patient status:  A. RR LESS THAN 10 breaths per minute, B. Oxygen saturation LESS THAN 90%, C. Sedation score of 6  ondansetron Injectable 4 milliGRAM(s) IV Push every 6 hours PRN Nausea  polyethylene glycol 3350 17 Gram(s) Oral daily PRN Constipation      Vital Signs Last 24 Hrs  T(C): 36.9 (06 Jan 2020 08:48), Max: 36.9 (05 Jan 2020 18:46)  T(F): 98.4 (06 Jan 2020 08:48), Max: 98.5 (05 Jan 2020 18:46)  HR: 75 (06 Jan 2020 08:48) (72 - 86)  BP: 116/75 (06 Jan 2020 08:48) (103/70 - 121/81)  BP(mean): --  RR: 17 (06 Jan 2020 08:48) (16 - 18)  SpO2: 95% (06 Jan 2020 08:48) (95% - 97%)  CAPILLARY BLOOD GLUCOSE        I&O's Summary    05 Jan 2020 07:01  -  06 Jan 2020 07:00  --------------------------------------------------------  IN: 3480 mL / OUT: 3665 mL / NET: -185 mL    06 Jan 2020 07:01  -  06 Jan 2020 09:11  --------------------------------------------------------  IN: 0 mL / OUT: 300 mL / NET: -300 mL        PHYSICAL EXAM:  GENERAL: NAD, anicteric, afebrile  HEAD:  Atraumatic, Normocephalic  EYES: EOMI, PERRLA, conjunctiva and sclera clear  NECK: Supple, No JVD  CHEST/LUNG: Clear to auscultation bilaterally; No wheeze  HEART: Regular rate and rhythm; No murmurs, rubs, or gallops  ABDOMEN: Soft, Nontender, Nondistended; Bowel sounds present  EXTREMITIES:  2+ Peripheral Pulses, No clubbing, cyanosis, or edema  PSYCH: AAOx3  NEUROLOGY: non-focal  SKIN: No rashes or lesions    LABS:                        11.5   6.82  )-----------( 251      ( 06 Jan 2020 06:45 )             37.0     01-06    138  |  102  |  9   ----------------------------<  95  3.6   |  27  |  0.78    Ca    9.3      06 Jan 2020 06:44

## 2020-01-06 NOTE — PROGRESS NOTE ADULT - ASSESSMENT
64yM s/p prostate treatment    -Gaitan removed this AM  -Pain control  -Hydration  -Bowel regimen  -Abx  -Pyridium PRN dysuria
64yoM w/ PMHx significant for lung cancer s/p B/L lobectomies (chemo/radiation in 2010), thyroid cancer now w/ hypothyroidism, COPD, LILY (non-compliant w/ CPAP), and BPH (s/p TURP in 2014 and 2017), who presents for planned prostate artery embolization given multiple recent episodes of urinary retention; procedure was performed on 1/2, and patient admitted for monitoring.
imp/rx:  Blood cultures remain negative.  UA with only a few WBCs--not really typical of infection.  Not 'septic' appearing.  Has been on zosyn, but I am inclined to watch off abx at present.  Suspect fevers related to embolization--post embolization syndrome.
stable post pae but ? persistent retention    discontinue anticholinergics ( oxybutynin narcotics and taper valium)  continue flomax 2 x day  oob ambulate  check pvr with indwelling bloom if > 400 cc  outpatient gu management
64yoM w/ PMHx significant for lung cancer s/p B/L lobectomies (chemo/radiation in 2010), thyroid cancer now w/ hypothyroidism, COPD, LILY (non-compliant w/ CPAP), and BPH (s/p TURP in 2014 and 2017), who presents for planned prostate artery embolization given multiple recent episodes of urinary retention; procedure was performed on 1/2, and patient admitted for monitoring.

## 2020-01-06 NOTE — PROGRESS NOTE ADULT - PROBLEM SELECTOR PLAN 4
- continue home Synthroid 250mcg daily
- pt is noncompliant w/ CPAP at night  - continue to monitor SpO2 (goal between 88-92%) overnight

## 2020-01-06 NOTE — PROGRESS NOTE ADULT - REASON FOR ADMISSION
PAE
Urinary Retention. PAE
elective prostate artery embolization
Prostate artery embolization
retention

## 2020-01-06 NOTE — DISCHARGE NOTE PROVIDER - CARE PROVIDER_API CALL
Pradeep Barajas)  Medicine  [address] [place] [address]  Phone: [phone]  Fax: [phone]  Follow Up Time: 1 week    Goldberg, Gary D D (MD)  Urology  [address] [place] [address] [address]  [place] [address]  Phone: [phone]  Fax: [phone]  Follow Up Time: 1 week

## 2020-01-06 NOTE — PROGRESS NOTE ADULT - PROBLEM SELECTOR PROBLEM 3
LILY (obstructive sleep apnea)
COPD (chronic obstructive pulmonary disease)

## 2020-01-06 NOTE — PROGRESS NOTE ADULT - PROBLEM SELECTOR PLAN 1
- s/p prostate artery embolization performed by IR ; well tolerated  -  TOV successful ; PVR this am 134cc  -  can d/c valium   - c/w Flomax 0.4 mg po bid  - bowel regimen for constipation  - clinically stable for discharge to f/up with Urology within 1 week.

## 2020-01-06 NOTE — DISCHARGE NOTE PROVIDER - CARE PROVIDERS DIRECT ADDRESSES
,DirectAddress_Unknown,[email or web address]

## 2020-01-09 LAB
CULTURE RESULTS: SIGNIFICANT CHANGE UP
CULTURE RESULTS: SIGNIFICANT CHANGE UP
SPECIMEN SOURCE: SIGNIFICANT CHANGE UP
SPECIMEN SOURCE: SIGNIFICANT CHANGE UP

## 2020-01-13 ENCOUNTER — APPOINTMENT (OUTPATIENT)
Dept: PULMONOLOGY | Facility: CLINIC | Age: 65
End: 2020-01-13
Payer: MEDICARE

## 2020-01-13 ENCOUNTER — NON-APPOINTMENT (OUTPATIENT)
Age: 65
End: 2020-01-13

## 2020-01-13 VITALS
OXYGEN SATURATION: 98 % | WEIGHT: 215 LBS | HEIGHT: 76 IN | DIASTOLIC BLOOD PRESSURE: 65 MMHG | RESPIRATION RATE: 16 BRPM | SYSTOLIC BLOOD PRESSURE: 125 MMHG | BODY MASS INDEX: 26.18 KG/M2 | HEART RATE: 95 BPM

## 2020-01-13 PROCEDURE — 99214 OFFICE O/P EST MOD 30 MIN: CPT | Mod: 25

## 2020-01-13 PROCEDURE — 94010 BREATHING CAPACITY TEST: CPT

## 2020-01-13 RX ORDER — LEVOTHYROXINE SODIUM 0.12 MG/1
125 TABLET ORAL DAILY
Qty: 90 | Refills: 0 | Status: ACTIVE | COMMUNITY

## 2020-01-13 RX ORDER — FLUTICASONE PROPIONATE 50 UG/1
50 SPRAY, METERED NASAL TWICE DAILY
Qty: 3 | Refills: 1 | Status: ACTIVE | COMMUNITY
Start: 2018-07-19 | End: 1900-01-01

## 2020-01-13 NOTE — PHYSICAL EXAM
[Normal Appearance] : normal appearance [General Appearance - Well Developed] : well developed [Well Groomed] : well groomed [General Appearance - Well Nourished] : well nourished [No Deformities] : no deformities [General Appearance - In No Acute Distress] : no acute distress [Normal Conjunctiva] : the conjunctiva exhibited no abnormalities [Eyelids - No Xanthelasma] : the eyelids demonstrated no xanthelasmas [II] : II [Normal Oropharynx] : normal oropharynx [Neck Appearance] : the appearance of the neck was normal [Neck Cervical Mass (___cm)] : no neck mass was observed [Jugular Venous Distention Increased] : there was no jugular-venous distention [Thyroid Nodule] : there were no palpable thyroid nodules [Thyroid Diffuse Enlargement] : the thyroid was not enlarged [Heart Sounds] : normal S1 and S2 [Murmurs] : no murmurs present [Heart Rate And Rhythm] : heart rate and rhythm were normal [Respiration, Rhythm And Depth] : normal respiratory rhythm and effort [Exaggerated Use Of Accessory Muscles For Inspiration] : no accessory muscle use [Auscultation Breath Sounds / Voice Sounds] : lungs were clear to auscultation bilaterally [Abdomen Tenderness] : non-tender [Abdomen Soft] : soft [Abdomen Mass (___ Cm)] : no abdominal mass palpated [Abnormal Walk] : normal gait [Gait - Sufficient For Exercise Testing] : the gait was sufficient for exercise testing [Petechial Hemorrhages (___cm)] : no petechial hemorrhages [Cyanosis, Localized] : no localized cyanosis [Nail Clubbing] : no clubbing of the fingernails [Skin Color & Pigmentation] : normal skin color and pigmentation [No Venous Stasis] : no venous stasis [] : no rash [No Skin Ulcers] : no skin ulcer [Skin Lesions] : no skin lesions [No Xanthoma] : no  xanthoma was observed [No Focal Deficits] : no focal deficits [Deep Tendon Reflexes (DTR)] : deep tendon reflexes were 2+ and symmetric [Sensation] : the sensory exam was normal to light touch and pinprick [Oriented To Time, Place, And Person] : oriented to person, place, and time [Impaired Insight] : insight and judgment were intact [Affect] : the affect was normal [FreeTextEntry1] : I:E ratio 1:3; clear

## 2020-01-13 NOTE — REVIEW OF SYSTEMS
[Cough] : cough [Sputum] : sputum  [Dyspnea] : dyspnea [Itchy Eyes] : itching of ~T the eyes [As Noted in HPI] : as noted in HPI [Negative] : Sleep Disorder [FreeTextEntry2] : hoarseness

## 2020-01-13 NOTE — ADDENDUM
[FreeTextEntry1] : Documented by Chloe Moreno acting as a scribe for Dr. Cliff Marvin on 01/13/2020 \par \par All medical record entries made by the Scribe were at my, Dr. Cliff Marvin's, direction and personally dictated by me on 01/13/2020 . I have reviewed the chart and agree that the record accurately reflects my personal performance of the history, physical exam, assessment and plan. I have also personally directed, reviewed, and agree with the discharge instructions.\par

## 2020-01-13 NOTE — PROCEDURE
[FreeTextEntry1] : PFT revealed severe obstructive dysfunction, restrictive dysfunction, with a FEV1 of 1.60 L, which is 37% of predicted, with a normal flow volume loop. \par \par \par

## 2020-01-13 NOTE — HISTORY OF PRESENT ILLNESS
[FreeTextEntry1] : Mr. Caro is a 64 year old male presenting to the office for a follow up visit for abnormal PFTs, allergic rhinitis, adenocarcinoma of the lung, COPD, GERD, LILY and shortness of breath. His chief complaint is prostate. \par \par -Patient was recently hospitalized for prostate artery embolism \par - Currently c/o of urinary issues. \par - Occasionally has diarrhea\par - He has lost 10 lbs in the past 10 years\par - Occasionally gets dry eyes\par - States he has nocturia and has interrupted sleep \par - This week, currently he has more SOB s/p surgery \par - He was also vomiting since the surgery (Catherization) \par - He will be going to get hearing aids as his hearing is not that well\par - Every once in a while, he gets runny sinuses \par \par denies any headaches, nausea, fever, chills, sweats, chest pain, chest pressure, constipation, dysphagia, dizziness, sour taste in the mouth, leg swelling, leg pain, itchy eyes, itchy ears, heartburn, reflux, myalgias or arthralgias\par

## 2020-01-14 ENCOUNTER — FORM ENCOUNTER (OUTPATIENT)
Age: 65
End: 2020-01-14

## 2020-01-15 ENCOUNTER — APPOINTMENT (OUTPATIENT)
Dept: CT IMAGING | Facility: CLINIC | Age: 65
End: 2020-01-15
Payer: MEDICARE

## 2020-01-15 ENCOUNTER — OUTPATIENT (OUTPATIENT)
Dept: OUTPATIENT SERVICES | Facility: HOSPITAL | Age: 65
LOS: 1 days | End: 2020-01-15
Payer: MEDICARE

## 2020-01-15 DIAGNOSIS — Z98.890 OTHER SPECIFIED POSTPROCEDURAL STATES: Chronic | ICD-10-CM

## 2020-01-15 DIAGNOSIS — E89.0 POSTPROCEDURAL HYPOTHYROIDISM: Chronic | ICD-10-CM

## 2020-01-15 DIAGNOSIS — C34.92 MALIGNANT NEOPLASM OF UNSPECIFIED PART OF LEFT BRONCHUS OR LUNG: ICD-10-CM

## 2020-01-15 DIAGNOSIS — C34.92 MALIGNANT NEOPLASM OF UNSPECIFIED PART OF LEFT BRONCHUS OR LUNG: Chronic | ICD-10-CM

## 2020-01-15 PROCEDURE — 71250 CT THORAX DX C-: CPT | Mod: 26

## 2020-01-15 PROCEDURE — 71250 CT THORAX DX C-: CPT
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
## 2020-02-13 ENCOUNTER — APPOINTMENT (OUTPATIENT)
Dept: INTERVENTIONAL RADIOLOGY/VASCULAR | Facility: CLINIC | Age: 65
End: 2020-02-13
Payer: MEDICARE

## 2020-02-13 VITALS
OXYGEN SATURATION: 99 % | DIASTOLIC BLOOD PRESSURE: 83 MMHG | TEMPERATURE: 98.6 F | RESPIRATION RATE: 18 BRPM | HEART RATE: 102 BPM | SYSTOLIC BLOOD PRESSURE: 116 MMHG

## 2020-02-13 DIAGNOSIS — N40.1 BENIGN PROSTATIC HYPERPLASIA WITH LOWER URINARY TRACT SYMPMS: ICD-10-CM

## 2020-02-13 PROCEDURE — 99215 OFFICE O/P EST HI 40 MIN: CPT

## 2020-02-13 RX ORDER — UMECLIDINIUM 62.5 UG/1
62.5 AEROSOL, POWDER ORAL
Qty: 3 | Refills: 1 | Status: DISCONTINUED | COMMUNITY
Start: 2019-01-07 | End: 2020-02-13

## 2020-03-26 ENCOUNTER — TRANSCRIPTION ENCOUNTER (OUTPATIENT)
Age: 65
End: 2020-03-26

## 2020-04-02 ENCOUNTER — APPOINTMENT (OUTPATIENT)
Dept: INTERVENTIONAL RADIOLOGY/VASCULAR | Facility: CLINIC | Age: 65
End: 2020-04-02
Payer: MEDICARE

## 2020-04-02 PROCEDURE — 99448 NTRPROF PH1/NTRNET/EHR 21-30: CPT

## 2020-04-02 RX ORDER — CHOLECALCIFEROL (VITAMIN D3) 25 MCG
25 MCG TABLET ORAL DAILY
Refills: 0 | Status: ACTIVE | COMMUNITY
Start: 2020-04-02

## 2020-04-06 ENCOUNTER — TRANSCRIPTION ENCOUNTER (OUTPATIENT)
Age: 65
End: 2020-04-06

## 2020-04-07 ENCOUNTER — TRANSCRIPTION ENCOUNTER (OUTPATIENT)
Age: 65
End: 2020-04-07

## 2020-04-15 ENCOUNTER — TRANSCRIPTION ENCOUNTER (OUTPATIENT)
Age: 65
End: 2020-04-15

## 2020-04-28 ENCOUNTER — APPOINTMENT (OUTPATIENT)
Dept: OTOLARYNGOLOGY | Facility: CLINIC | Age: 65
End: 2020-04-28
Payer: MEDICARE

## 2020-04-28 VITALS
DIASTOLIC BLOOD PRESSURE: 76 MMHG | SYSTOLIC BLOOD PRESSURE: 113 MMHG | HEIGHT: 76 IN | HEART RATE: 100 BPM | BODY MASS INDEX: 25.57 KG/M2 | TEMPERATURE: 209.48 F | WEIGHT: 210 LBS

## 2020-04-28 DIAGNOSIS — H90.3 SENSORINEURAL HEARING LOSS, BILATERAL: ICD-10-CM

## 2020-04-28 DIAGNOSIS — H90.11 CONDUCTIVE HEARING LOSS, UNILATERAL, RIGHT EAR, WITH UNRESTRICTED HEARING ON THE CONTRALATERAL SIDE: ICD-10-CM

## 2020-04-28 DIAGNOSIS — H71.91 UNSPECIFIED CHOLESTEATOMA, RIGHT EAR: ICD-10-CM

## 2020-04-28 PROCEDURE — 92504 EAR MICROSCOPY EXAMINATION: CPT

## 2020-04-28 PROCEDURE — 92557 COMPREHENSIVE HEARING TEST: CPT

## 2020-04-28 PROCEDURE — 99214 OFFICE O/P EST MOD 30 MIN: CPT | Mod: 25

## 2020-04-28 PROCEDURE — 92567 TYMPANOMETRY: CPT

## 2020-04-28 NOTE — PROCEDURE
[] : Otitis Media w/Effusion [Same] : same as the Pre Op Dx. [FreeTextEntry4] : none [FreeTextEntry6] : Operative microscope was used to examine the ear canal, ear drum and visible middle ear landmarks. Adequate exam would not have been possible without the use of a microscope. Findings are described.\par \par  [FreeTextEntry1] : R mixed loss

## 2020-04-28 NOTE — PHYSICAL EXAM
[Binocular Microscopic Exam] : Binocular microscopic exam was performed [Hearing Loss Right Only] : normal [Hearing Loss Left Only] : normal [de-identified] : well healed TM, prosthesis medial to cartilage, no e/o cholesteatoma  [Normal] : no rashes

## 2020-04-28 NOTE — HISTORY OF PRESENT ILLNESS
[de-identified] : 63 y/o male with a history of b/l SNHL and R Cholesteatoma.  Was seen in January of 2019, cleared for HA's at that time. Pt. now reports recent increase in hearing loss, R>L.  Pt. saw ENT ( Dr. Moore) in March who referred Pt. to our office.   States constant bilateral tinnitus for years and is getting worse.

## 2020-06-16 ENCOUNTER — APPOINTMENT (OUTPATIENT)
Dept: PULMONOLOGY | Facility: CLINIC | Age: 65
End: 2020-06-16
Payer: MEDICARE

## 2020-06-16 VITALS
BODY MASS INDEX: 27.34 KG/M2 | HEART RATE: 107 BPM | DIASTOLIC BLOOD PRESSURE: 80 MMHG | SYSTOLIC BLOOD PRESSURE: 124 MMHG | HEIGHT: 74 IN | TEMPERATURE: 207.32 F | WEIGHT: 213 LBS | RESPIRATION RATE: 16 BRPM | OXYGEN SATURATION: 97 %

## 2020-06-16 PROCEDURE — 99214 OFFICE O/P EST MOD 30 MIN: CPT

## 2020-06-16 RX ORDER — AZELASTINE HYDROCHLORIDE 205.5 UG/1
0.15 SPRAY, METERED NASAL TWICE DAILY
Qty: 3 | Refills: 1 | Status: ACTIVE | COMMUNITY
Start: 2020-06-16 | End: 1900-01-01

## 2020-06-16 RX ORDER — ALPRAZOLAM 0.5 MG/1
0.5 TABLET ORAL
Qty: 120 | Refills: 0 | Status: ACTIVE | COMMUNITY
Start: 2018-07-19 | End: 1900-01-01

## 2020-07-10 ENCOUNTER — APPOINTMENT (OUTPATIENT)
Dept: CT IMAGING | Facility: CLINIC | Age: 65
End: 2020-07-10
Payer: MEDICARE

## 2020-07-10 ENCOUNTER — OUTPATIENT (OUTPATIENT)
Dept: OUTPATIENT SERVICES | Facility: HOSPITAL | Age: 65
LOS: 1 days | End: 2020-07-10
Payer: MEDICARE

## 2020-07-10 ENCOUNTER — RESULT REVIEW (OUTPATIENT)
Age: 65
End: 2020-07-10

## 2020-07-10 DIAGNOSIS — E89.0 POSTPROCEDURAL HYPOTHYROIDISM: Chronic | ICD-10-CM

## 2020-07-10 DIAGNOSIS — E66.3 OVERWEIGHT: ICD-10-CM

## 2020-07-10 DIAGNOSIS — Z98.890 OTHER SPECIFIED POSTPROCEDURAL STATES: Chronic | ICD-10-CM

## 2020-07-10 DIAGNOSIS — Z00.00 ENCOUNTER FOR GENERAL ADULT MEDICAL EXAMINATION WITHOUT ABNORMAL FINDINGS: ICD-10-CM

## 2020-07-10 DIAGNOSIS — C34.92 MALIGNANT NEOPLASM OF UNSPECIFIED PART OF LEFT BRONCHUS OR LUNG: Chronic | ICD-10-CM

## 2020-07-10 PROCEDURE — 71250 CT THORAX DX C-: CPT

## 2020-07-10 PROCEDURE — 71250 CT THORAX DX C-: CPT | Mod: 26

## 2020-07-21 NOTE — ASSESSMENT
[FreeTextEntry1] : Mr. Caro is a 64 year old male who has a history of BPH, hypothyroid, COPD, multiple lung cancers, OSAS, allergy, and GERD. He is doing well from a pulmonary perspective. His number one complaint is cough.\par \par *********************************************PULMONARY CLEARANCE 7/21/2020**********************************************\par His shortness of breath is multifactorial due to\par - history of COPD from prior smoking\par - restrictive disfunction due to multiple lung surgeries \par - obesity/out of shape \par - vocal cords\par - poor mechanics of breathing\par \par Problem 1: COPD\par -nebulizer with albuterol up to QID (0.83) \par - continue Symbicort 160 2 inhalations BID\par - continue Incruse 1 inhalation QD (after Symbicort )\par - follow up with pulmonary rehab\par \par -COPD is a progressive disease and although it can’t be cured , appropriate management can slow its progression, reduce frequency and severity of exacerbations, and improve symptoms and the patient quality of life. Hospitalizations are the greatest contributor to the total COPD costs and account for up to 87% of total COPD related costs. Exacerbations are the main cause of admissions and subsequently account for the 40-75% of COPD costs. Inhaled maintenance therapy reduces the incidence of exacerbations in patients with stable COPD. Incorrect inhaler use and nonadherence are major obstacles to achieving COPD treatment goals. Many COPD patients have challenges (impaired inhalation, limited dexterity, reduced cognition: that limit their ability to correctly use their COPD treatment devices resulting in reduced symptom control. Of most importance is smoking cessation and early intervention with respiratory illnesses and contemplation for pulmonary rehab to enhance quality of life. \par \par Problem 2: Obesity (improved)\par -Weight loss, exercise, and diet control were discussed and are highly encouraged. Treatment options were given such as, aqua therapy, and contacting a nutritionist. Recommended to use the elliptical, stationary bike, less use of treadmill. Obesity is associated with worsening asthma, shortness of breath, and potential for cardiac disease, diabetes, and other underlying medical conditions. \par \par Problem 3: allergies/sinuses\par - add Astelin 0.15% 1 sniff/nostril BID\par - continue Flonase 1 spray each nostril BID\par - Off Xyzal 5 mg QHS due to prostate issues. \par -Environmental measures for allergies were encouraged including mattress and pillow cover, air purifier, and environmental controls. \par \par Problem 4: GERD\par - continue Omeprazole 40 mg BID (pre-breakfast/pre-dinner) \par -Rule of 2s: avoid eating too much, eating too late, eating too spicy, eating two hours before bed\par -Things to avoid including overeating, spicy foods, tight clothing, eating within three hours of bed, this list is not all inclusive. \par -For treatment of reflux, possible options discussed including diet control, H2 blockers, PPIs, as well as coating motility agents discussed as treatment options. Timing of meals and proximity of last meal to sleep were discussed. If symptoms persist, a formal gastrointestinal evaluation is needed. \par \par problem 4A: Pulmonary Clearance for Endoscopy\par -at this point in time there are no absolute pulmonary contraindications to go forward with the planned procedure \par \par  \par Problem 5: sleep apnea,\par - side sleeping (positional change)\par -has refused treatment ; chinstrap\par -Sleep apnea is associated with adverse clinical consequences which an affect most organ systems. Cardiovascular disease risk includes arrhythmias, atrial fibrillation, hypertension, coronary artery disease, and stroke. Metabolic disorders include diabetes type 2, non-alcoholic fatty liver disease. Mood disorder especially depression; and cognitive decline especially in the elderly. Associations with chronic reflux/Zamudio’s esophagus some but not all inclusive. \par -Reasons to assess this include arousal consistent with hypopnea; respiratory events most prominent in REM sleep or supine position; therefore sleep staging and body position are important for accurate diagnosis and estimation of AHI. \par \par problem 6: lung cancer screening\par -recommended follow up chest CT yearly, next CT due in 7/2020\par - Lung cancer screening is recommended for people between the ages of 55 and 80 with prior 30+ pack year smoking histories. There is irrefutable evidence for realization of lung cancer screening based on two large randomized control trials demonstrating relative reduction in lung cancer mortality for patients undergoing low-dose CT scanning. Risks and benefits reviewed with the patient \par \par Problem 7a: COVID-19 precautionary Immune Support Recommendations:\par -OTC Vitamin C 500mg BID \par -OTC Quercetin 250-500mg BID \par -OTC Zinc 75-100mg per day \par -OTC Melatonin 1 or 2mg a night \par -OTC Vitamin D 1-4000mg per day \par -OTC Tonic Water 8oz per day\par -Water 0.5-1 gallon per day\par \par problem 7: health maintenance \par -recommended to go to pulmonary rehab\par -he is has Xanax 0.5 mg PRN for anxiety\par -received influenza vaccination 2019\par -recommended strep pneumonia vaccines: Prevnar-13 vaccine, followed by Pneumo vaccine 23 one year following\par -recommended early intervention for URIs\par -recommended regular osteoporosis evaluations\par -recommended early dermatological evaluations\par -recommended after the age of 50 to the age of 70, colonoscopy every 5 years \par  \par \par F/U in 4-6 months\par He is encouraged to call with any questions, concerns, and changes. \par

## 2020-07-21 NOTE — PROCEDURE
[FreeTextEntry1] : CT (1.2020) 1. Status post right upper lobe lobectomy and left upper lobe lobectomy. \par 2. Emphysema. \par 3. A left lower lobe 5 mm opacity is less conspicuous, however, the right lower lobe opacity not \par visualized.

## 2020-07-21 NOTE — ADDENDUM
[FreeTextEntry1] : ********amended by Regulo Adler on 7/21/2020******************************\par \par Documented by Anastasia Heller acting as a scribe for Dr. Cliff Marvin on (06/16/2020).\par \par All medical record entries made by the Scribe were at my, Dr. Cliff Marvin's, direction and personally dictated by me on (06/16/2020). I have reviewed the chart and agree that the record accurately reflects my personal performance of the history, physical exam, assessment and plan. I have also personally directed, reviewed, and agree with the discharge instructions. \par \par \par

## 2020-07-21 NOTE — PHYSICAL EXAM
[General Appearance - Well Developed] : well developed [Normal Appearance] : normal appearance [General Appearance - Well Nourished] : well nourished [Well Groomed] : well groomed [General Appearance - In No Acute Distress] : no acute distress [No Deformities] : no deformities [Eyelids - No Xanthelasma] : the eyelids demonstrated no xanthelasmas [Normal Conjunctiva] : the conjunctiva exhibited no abnormalities [Normal Oropharynx] : normal oropharynx [II] : II [Neck Cervical Mass (___cm)] : no neck mass was observed [Neck Appearance] : the appearance of the neck was normal [Jugular Venous Distention Increased] : there was no jugular-venous distention [Thyroid Nodule] : there were no palpable thyroid nodules [Thyroid Diffuse Enlargement] : the thyroid was not enlarged [Heart Rate And Rhythm] : heart rate and rhythm were normal [Heart Sounds] : normal S1 and S2 [Murmurs] : no murmurs present [Respiration, Rhythm And Depth] : normal respiratory rhythm and effort [Exaggerated Use Of Accessory Muscles For Inspiration] : no accessory muscle use [Auscultation Breath Sounds / Voice Sounds] : lungs were clear to auscultation bilaterally [Abdomen Tenderness] : non-tender [Abdomen Soft] : soft [Abdomen Mass (___ Cm)] : no abdominal mass palpated [Abnormal Walk] : normal gait [Gait - Sufficient For Exercise Testing] : the gait was sufficient for exercise testing [Nail Clubbing] : no clubbing of the fingernails [Petechial Hemorrhages (___cm)] : no petechial hemorrhages [Cyanosis, Localized] : no localized cyanosis [Skin Color & Pigmentation] : normal skin color and pigmentation [] : no rash [No Venous Stasis] : no venous stasis [Skin Lesions] : no skin lesions [No Xanthoma] : no  xanthoma was observed [No Skin Ulcers] : no skin ulcer [Deep Tendon Reflexes (DTR)] : deep tendon reflexes were 2+ and symmetric [Sensation] : the sensory exam was normal to light touch and pinprick [No Focal Deficits] : no focal deficits [Oriented To Time, Place, And Person] : oriented to person, place, and time [Impaired Insight] : insight and judgment were intact [Affect] : the affect was normal [FreeTextEntry1] : I:E ratio 1:3; clear

## 2020-07-21 NOTE — HISTORY OF PRESENT ILLNESS
[FreeTextEntry1] : Mr. Caro is a 64 year old male presenting to the office for a follow up visit for abnormal PFTs, allergic rhinitis, adenocarcinoma of the lung, COPD, GERD, LILY and shortness of breath. His chief complaint is prostate. \par - He has lost some weight \par - He is going for a hearing aid next week \par - His bowels are regular \par - His sense of smell and taste are good \par - He has been coughing a lot- blieves it is from PND \par - No visual issues\par - His energy level is good\par - He exercises occasionally \par - He notes some pain by his ribs\par - The cough is sometimes dry and sometimes he brings up mucus\par - He is getting an endoscopy soon \par - He is sleeping well \par - He is taking claritin for post nasal drip\par - denies any headaches, nausea, vomiting, fever, chills, sweats, chest pain, chest pressure, diarrhea, constipation, dysphagia, dizziness, leg swelling, leg pain, itchy eyes, itchy ears, heartburn, reflux, or sour taste in the mouth.\par \par \par

## 2020-07-29 ENCOUNTER — TRANSCRIPTION ENCOUNTER (OUTPATIENT)
Age: 65
End: 2020-07-29

## 2020-08-07 ENCOUNTER — APPOINTMENT (OUTPATIENT)
Dept: ULTRASOUND IMAGING | Facility: CLINIC | Age: 65
End: 2020-08-07
Payer: MEDICARE

## 2020-08-07 ENCOUNTER — OUTPATIENT (OUTPATIENT)
Dept: OUTPATIENT SERVICES | Facility: HOSPITAL | Age: 65
LOS: 1 days | End: 2020-08-07
Payer: MEDICARE

## 2020-08-07 DIAGNOSIS — Z00.8 ENCOUNTER FOR OTHER GENERAL EXAMINATION: ICD-10-CM

## 2020-08-07 DIAGNOSIS — Z98.890 OTHER SPECIFIED POSTPROCEDURAL STATES: Chronic | ICD-10-CM

## 2020-08-07 DIAGNOSIS — C34.92 MALIGNANT NEOPLASM OF UNSPECIFIED PART OF LEFT BRONCHUS OR LUNG: Chronic | ICD-10-CM

## 2020-08-07 DIAGNOSIS — E89.0 POSTPROCEDURAL HYPOTHYROIDISM: Chronic | ICD-10-CM

## 2020-08-07 PROCEDURE — 76536 US EXAM OF HEAD AND NECK: CPT | Mod: 26

## 2020-08-07 PROCEDURE — 76536 US EXAM OF HEAD AND NECK: CPT

## 2020-09-10 ENCOUNTER — OUTPATIENT (OUTPATIENT)
Dept: OUTPATIENT SERVICES | Facility: HOSPITAL | Age: 65
LOS: 1 days | End: 2020-09-10
Payer: MEDICARE

## 2020-09-10 ENCOUNTER — RESULT REVIEW (OUTPATIENT)
Age: 65
End: 2020-09-10

## 2020-09-10 VITALS
WEIGHT: 210.1 LBS | HEART RATE: 84 BPM | HEIGHT: 76 IN | DIASTOLIC BLOOD PRESSURE: 76 MMHG | RESPIRATION RATE: 24 BRPM | OXYGEN SATURATION: 98 % | TEMPERATURE: 97 F | SYSTOLIC BLOOD PRESSURE: 116 MMHG

## 2020-09-10 VITALS
DIASTOLIC BLOOD PRESSURE: 60 MMHG | SYSTOLIC BLOOD PRESSURE: 96 MMHG | OXYGEN SATURATION: 99 % | HEART RATE: 60 BPM | RESPIRATION RATE: 18 BRPM

## 2020-09-10 DIAGNOSIS — Z11.59 ENCOUNTER FOR SCREENING FOR OTHER VIRAL DISEASES: ICD-10-CM

## 2020-09-10 DIAGNOSIS — Z98.890 OTHER SPECIFIED POSTPROCEDURAL STATES: Chronic | ICD-10-CM

## 2020-09-10 DIAGNOSIS — E89.0 POSTPROCEDURAL HYPOTHYROIDISM: Chronic | ICD-10-CM

## 2020-09-10 DIAGNOSIS — C34.92 MALIGNANT NEOPLASM OF UNSPECIFIED PART OF LEFT BRONCHUS OR LUNG: Chronic | ICD-10-CM

## 2020-09-10 DIAGNOSIS — K22.70 BARRETT'S ESOPHAGUS WITHOUT DYSPLASIA: ICD-10-CM

## 2020-09-10 PROCEDURE — 88341 IMHCHEM/IMCYTCHM EA ADD ANTB: CPT

## 2020-09-10 PROCEDURE — 88305 TISSUE EXAM BY PATHOLOGIST: CPT | Mod: 26

## 2020-09-10 PROCEDURE — 43239 EGD BIOPSY SINGLE/MULTIPLE: CPT

## 2020-09-10 PROCEDURE — 88341 IMHCHEM/IMCYTCHM EA ADD ANTB: CPT | Mod: 26

## 2020-09-10 PROCEDURE — 88305 TISSUE EXAM BY PATHOLOGIST: CPT

## 2020-09-10 PROCEDURE — 88342 IMHCHEM/IMCYTCHM 1ST ANTB: CPT | Mod: 26

## 2020-09-10 PROCEDURE — 88342 IMHCHEM/IMCYTCHM 1ST ANTB: CPT

## 2020-09-10 RX ORDER — SODIUM CHLORIDE 9 MG/ML
500 INJECTION INTRAMUSCULAR; INTRAVENOUS; SUBCUTANEOUS
Refills: 0 | Status: DISCONTINUED | OUTPATIENT
Start: 2020-09-10 | End: 2020-09-24

## 2020-09-10 RX ADMIN — SODIUM CHLORIDE 30 MILLILITER(S): 9 INJECTION INTRAMUSCULAR; INTRAVENOUS; SUBCUTANEOUS at 07:49

## 2020-09-10 NOTE — PRE PROCEDURE NOTE - PRE PROCEDURE EVALUATION
Attending Physician:       Mike Lee                     Procedure: EGD with biopsy    Indication for Procedure: GERD, Zamudio's esophagus  ________________________________________________________  PAST MEDICAL & SURGICAL HISTORY:  Former smoker  History of colon polyps  Zamudio's esophagus without dysplasia  History of chemotherapy: and radiation 1998  Unspecified cholesteatoma, right ear: 12/2016  Thyroid nodule  Abdominal hernia  Umbilical hernia  Lung cancer, left: upper lobe 2010  GERD (Gastroesophageal Reflux Disease)  Anxiety  BPH (Benign Prostatic Hyperplasia)  Pneumonia: 1990  LILY (Obstructive Sleep Apnea): does not use CPAP, last used it 15 years ago  Lung Cancer: right upper lobe 1997  COPD (Chronic Obstructive Pulmonary Disease): treated with inhalers  S/P ear surgery: unspecified cholesteatoma - right tympanomastoidectomy 12/16/2016  History of lung surgery: 2015 benign lesion @ left lung  History of prostate surgery: TURP 2014 and 2017  H/O thyroidectomy: total 4/ 2016  Lung cancer, left: left upper lobe VATS 2010  Mediastinum Neoplasm: S/P mediastinoscopy 1998  S/P Lobectomy of Lung: right upper 1997, [facility]    ALLERGIES:  No Known Allergies    HOME MEDICATIONS:  fluticasone:  inhaled 2 times a day  levothyroxine 125 mcg (0.125 mg) oral capsule: 2 cap(s) orally once a day  omeprazole 20 mg oral delayed release capsule: 1 cap(s) orally 2 times a day  senna oral tablet: 2 tab(s) orally once a day (at bedtime)  Symbicort 160 mcg-4.5 mcg/inh inhalation aerosol: 2 puff(s) inhaled 2 times a day  tamsulosin 0.4 mg oral capsule: 1 cap(s) orally 2 times a day  Vitamin D3 2000 intl units oral capsule: 2 tab(s) orally once a day    AICD/PPM: [ ] yes   [x ] no    PERTINENT LAB DATA: COVID-19 ab neg                      PHYSICAL EXAMINATION:    T(C): -- 96.7  HR: -- 96  BP: -- 120/70  RR: --  SpO2: --    Constitutional: NAD  HEENT: PERRLA, EOMI,    Neck:  No JVD  Respiratory: CTAB/L except dec BS at right base  Cardiovascular: S1 and S2  Gastrointestinal: BS+, soft, NT, protuberant, diastasis recti  Extremities: No peripheral edema  Neurological: A/O x 3, no focal deficits  Psychiatric: Normal mood, normal affect  Skin: No rashes    ASA Class: I [ ]  II [ ]  III [X ]  IV [ ]    COMMENTS:    The patient is a suitable candidate for the planned procedure unless box checked [ ]  No, explain:

## 2020-09-10 NOTE — ASU DISCHARGE PLAN (ADULT/PEDIATRIC) - ASU DC SPECIAL INSTRUCTIONSFT
Office will call with biopsy results in 7-10 days, then will determine timing of next upper endoscopy.    Call for office visit in 6 months

## 2020-09-10 NOTE — ASU PATIENT PROFILE, ADULT - TEACHING/LEARNING DEVELOPMENTAL CONSIDERATIONS
Patient laying in bed comfortably. VS taken. Pt c/o of chest pain radiating to back with deep breaths. MD at bedside and aware. MD states pt needs pulmonary exercise and to give pt Incentive Spirometry. Incentive spirometry given and explained to pt. Pt verbalized understanding. Pt denies shortness of breath. Pt respirations even and unlabored. Pt oxygen sat 95% on room air. No acute distress noted. Will continue to monitor. none

## 2020-09-14 ENCOUNTER — RX RENEWAL (OUTPATIENT)
Age: 65
End: 2020-09-14

## 2020-09-15 LAB — SURGICAL PATHOLOGY STUDY: SIGNIFICANT CHANGE UP

## 2020-10-06 ENCOUNTER — TRANSCRIPTION ENCOUNTER (OUTPATIENT)
Age: 65
End: 2020-10-06

## 2020-10-14 ENCOUNTER — APPOINTMENT (OUTPATIENT)
Dept: PULMONOLOGY | Facility: CLINIC | Age: 65
End: 2020-10-14
Payer: MEDICARE

## 2020-10-14 VITALS
HEART RATE: 98 BPM | RESPIRATION RATE: 16 BRPM | TEMPERATURE: 208.04 F | OXYGEN SATURATION: 98 % | DIASTOLIC BLOOD PRESSURE: 65 MMHG | HEIGHT: 75 IN | WEIGHT: 210 LBS | SYSTOLIC BLOOD PRESSURE: 110 MMHG | BODY MASS INDEX: 26.11 KG/M2

## 2020-10-14 PROCEDURE — 99214 OFFICE O/P EST MOD 30 MIN: CPT

## 2020-10-14 NOTE — ADDENDUM
[FreeTextEntry1] : Documented by Sohan Damon acting as a scribe for Dr. Cliff Marvin on 10/14/2020 \par \par All medical record entries made by the Scribe were at my, Dr. Cliff Marvin's, direction and personally dictated by me on 10/14/2020 . I have reviewed the chart and agree that the record accurately reflects my personal performance of the history, physical exam, assessment and plan. I have also personally directed, reviewed, and agree with the discharge instructions. \par \par \par

## 2020-10-14 NOTE — PROCEDURE
[FreeTextEntry1] : CT (JUL.10.2020) reveals post right and left upper lobectomy with associated volume loss and architectural distortion. Bibasilar focal areas of nodular scarring are without change. No new nodules, consolidations, edema, effusion or pneumothorax.

## 2020-10-14 NOTE — ASSESSMENT
[FreeTextEntry1] : Mr. Caro is a 65 year old Male who has a history of BPH, hypothyroid, COPD, multiple lung cancers, OSAS, allergy, and GERD. He is doing well from a pulmonary perspective. His number one complaint is cough / [place]. \par \par His shortness of breath is multifactorial due to\par - history of COPD from prior smoking\par - restrictive disfunction due to multiple lung surgeries \par - obesity/out of shape \par - vocal cords\par - poor mechanics of breathing\par \par Problem 1: COPD\par -nebulizer with albuterol up to QID (0.83) \par - continue Symbicort 160 2 inhalations BID\par - continue Incruse 1 inhalation QD (after Symbicort )\par - follow up with pulmonary rehab\par \par -COPD is a progressive disease and although it can’t be cured , appropriate management can slow its progression, reduce frequency and severity of exacerbations, and improve symptoms and the patient quality of life. Hospitalizations are the greatest contributor to the total COPD costs and account for up to 87% of total COPD related costs. Exacerbations are the main cause of admissions and subsequently account for the 40-75% of COPD costs. Inhaled maintenance therapy reduces the incidence of exacerbations in patients with stable COPD. Incorrect inhaler use and nonadherence are major obstacles to achieving COPD treatment goals. Many COPD patients have challenges (impaired inhalation, limited dexterity, reduced cognition: that limit their ability to correctly use their COPD treatment devices resulting in reduced symptom control. Of most importance is smoking cessation and early intervention with respiratory illnesses and contemplation for pulmonary rehab to enhance quality of life. \par \par Problem 2: Obesity (improved)\par -Weight loss, exercise, and diet control were discussed and are highly encouraged. Treatment options were given such as, aqua therapy, and contacting a nutritionist. Recommended to use the elliptical, stationary bike, less use of treadmill. Obesity is associated with worsening asthma, shortness of breath, and potential for cardiac disease, diabetes, and other underlying medical conditions. \par \par Problem 3: allergies/sinuses\par - continue Astelin 0.15% 1 sniff/nostril BID\par - continue Flonase 1 spray each nostril BID\par - Off Xyzal 5 mg QHS due to prostate issues. \par -Environmental measures for allergies were encouraged including mattress and pillow cover, air purifier, and environmental controls. \par \par Problem 4: GERD\par - continue Omeprazole 40 mg BID (pre-breakfast/pre-dinner) \par -Add Pepcid 40mg QHS \par -Rule of 2s: avoid eating too much, eating too late, eating too spicy, eating two hours before bed\par -Things to avoid including overeating, spicy foods, tight clothing, eating within three hours of bed, this list is not all inclusive. \par -For treatment of reflux, possible options discussed including diet control, H2 blockers, PPIs, as well as coating motility agents discussed as treatment options. Timing of meals and proximity of last meal to sleep were discussed. If symptoms persist, a formal gastrointestinal evaluation is needed. \par \par Problem 5: sleep apnea,\par - side sleeping (positional change)\par -has refused treatment ; chinstrap\par -Sleep apnea is associated with adverse clinical consequences which an affect most organ systems. Cardiovascular disease risk includes arrhythmias, atrial fibrillation, hypertension, coronary artery disease, and stroke. Metabolic disorders include diabetes type 2, non-alcoholic fatty liver disease. Mood disorder especially depression; and cognitive decline especially in the elderly. Associations with chronic reflux/Zamudio’s esophagus some but not all inclusive. \par -Reasons to assess this include arousal consistent with hypopnea; respiratory events most prominent in REM sleep or supine position; therefore sleep staging and body position are important for accurate diagnosis and estimation of AHI. \par \par problem 6: lung cancer screening\par -recommended follow up chest CT yearly, next CT due in 11/2021 (next) \par - Lung cancer screening is recommended for people between the ages of 55 and 80 with prior 30+ pack year smoking histories. There is irrefutable evidence for realization of lung cancer screening based on two large randomized control trials demonstrating relative reduction in lung cancer mortality for patients undergoing low-dose CT scanning. Risks and benefits reviewed with the patient \par \par Problem 7a: COVID-19 precautionary Immune Support Recommendations:\par -OTC Vitamin C 500mg BID \par -OTC Quercetin 250-500mg BID \par -OTC Zinc 75-100mg per day \par -OTC Melatonin 1 or 2mg a night \par -OTC Vitamin D 1-4000mg per day \par -OTC Tonic Water 8oz per day\par -Water 0.5-1 gallon per day\par \par problem 7: health maintenance \par -recommended to go to pulmonary rehab\par -he is has Xanax 0.5 mg PRN for anxiety\par -received influenza vaccination 2019\par -recommended strep pneumonia vaccines: Prevnar-13 vaccine, followed by Pneumo vaccine 23 one year following\par -recommended early intervention for URIs\par -recommended regular osteoporosis evaluations\par -recommended early dermatological evaluations\par -recommended after the age of 50 to the age of 70, colonoscopy every 5 years \par  \par \par F/U in 4-6 months\par He is encouraged to call with any questions, concerns, and changes. \par

## 2020-11-09 NOTE — ED PROCEDURE NOTE - PROCEDURE SUPERVISED BY
[Normal Appearance] : normal appearance [General Appearance - In No Acute Distress] : no acute distress [Skin Color & Pigmentation] : normal skin color and pigmentation [FreeTextEntry1] : normal peripheral circulation  [] : no respiratory distress [Exaggerated Use Of Accessory Muscles For Inspiration] : no accessory muscle use [Oriented To Time, Place, And Person] : oriented to person, place, and time Dr. Cifuentes

## 2020-12-07 ENCOUNTER — TRANSCRIPTION ENCOUNTER (OUTPATIENT)
Age: 65
End: 2020-12-07

## 2020-12-08 ENCOUNTER — TRANSCRIPTION ENCOUNTER (OUTPATIENT)
Age: 65
End: 2020-12-08

## 2020-12-08 DIAGNOSIS — Z11.59 ENCOUNTER FOR SCREENING FOR OTHER VIRAL DISEASES: ICD-10-CM

## 2020-12-08 DIAGNOSIS — Z78.9 OTHER SPECIFIED HEALTH STATUS: ICD-10-CM

## 2020-12-20 LAB — SARS-COV-2 N GENE NPH QL NAA+PROBE: NOT DETECTED

## 2020-12-21 ENCOUNTER — TRANSCRIPTION ENCOUNTER (OUTPATIENT)
Age: 65
End: 2020-12-21

## 2021-01-07 ENCOUNTER — TRANSCRIPTION ENCOUNTER (OUTPATIENT)
Age: 66
End: 2021-01-07

## 2021-01-07 RX ORDER — FAMOTIDINE 40 MG/1
40 TABLET, FILM COATED ORAL
Qty: 90 | Refills: 1 | Status: ACTIVE | COMMUNITY
Start: 2020-10-14 | End: 1900-01-01

## 2021-01-19 ENCOUNTER — TRANSCRIPTION ENCOUNTER (OUTPATIENT)
Age: 66
End: 2021-01-19

## 2021-01-22 ENCOUNTER — OUTPATIENT (OUTPATIENT)
Dept: OUTPATIENT SERVICES | Facility: HOSPITAL | Age: 66
LOS: 1 days | End: 2021-01-22
Payer: MEDICARE

## 2021-01-22 ENCOUNTER — RESULT REVIEW (OUTPATIENT)
Age: 66
End: 2021-01-22

## 2021-01-22 ENCOUNTER — APPOINTMENT (OUTPATIENT)
Dept: CT IMAGING | Facility: CLINIC | Age: 66
End: 2021-01-22
Payer: MEDICARE

## 2021-01-22 DIAGNOSIS — Z98.890 OTHER SPECIFIED POSTPROCEDURAL STATES: Chronic | ICD-10-CM

## 2021-01-22 DIAGNOSIS — R93.89 ABNORMAL FINDINGS ON DIAGNOSTIC IMAGING OF OTHER SPECIFIED BODY STRUCTURES: ICD-10-CM

## 2021-01-22 DIAGNOSIS — C34.92 MALIGNANT NEOPLASM OF UNSPECIFIED PART OF LEFT BRONCHUS OR LUNG: Chronic | ICD-10-CM

## 2021-01-22 DIAGNOSIS — E89.0 POSTPROCEDURAL HYPOTHYROIDISM: Chronic | ICD-10-CM

## 2021-01-22 PROCEDURE — 71250 CT THORAX DX C-: CPT | Mod: 26,MH

## 2021-01-22 PROCEDURE — 71250 CT THORAX DX C-: CPT

## 2021-01-25 ENCOUNTER — NON-APPOINTMENT (OUTPATIENT)
Age: 66
End: 2021-01-25

## 2021-01-26 ENCOUNTER — TRANSCRIPTION ENCOUNTER (OUTPATIENT)
Age: 66
End: 2021-01-26

## 2021-02-03 ENCOUNTER — NON-APPOINTMENT (OUTPATIENT)
Age: 66
End: 2021-02-03

## 2021-02-09 NOTE — HISTORY OF PRESENT ILLNESS
[FreeTextEntry1] : Mr. Caro is a 65 year old male presenting to the office for a follow up visit for abnormal PFTs, allergic rhinitis, adenocarcinoma of the lung, COPD, GERD, LILY and shortness of breath. His chief complaint is SOB on stairs. \par -overall has been feeling well. \par -still reports feeling a little winded when he works too much. \par -he recently got a bike and will be starting to bike. \par -reports SOB on stairs. \par -he is not able to run \par -recently lost a few lbs. \par -had seen the cardiologist and he was cleared. \par -has been coughing. \par -reports the need to blow his nose and cough while eating. \par -There are no visual issues. \par -he had a COVID-19 test due to having to get an endoscopy. \par -reports that he does not sleep with his partner due to having trouble with what show to watch. \par \par -He denies any fever, chills, sweats, chest pain, chest pressure, diarrhea, constipation, dysphagia, dizziness, sour taste in the mouth, leg swelling, leg pain, itchy eyes, itchy ears. 
No

## 2021-02-10 ENCOUNTER — TRANSCRIPTION ENCOUNTER (OUTPATIENT)
Age: 66
End: 2021-02-10

## 2021-02-10 DIAGNOSIS — Z01.812 ENCOUNTER FOR PREPROCEDURAL LABORATORY EXAMINATION: ICD-10-CM

## 2021-02-17 ENCOUNTER — APPOINTMENT (OUTPATIENT)
Dept: PULMONOLOGY | Facility: CLINIC | Age: 66
End: 2021-02-17
Payer: MEDICARE

## 2021-02-17 ENCOUNTER — NON-APPOINTMENT (OUTPATIENT)
Age: 66
End: 2021-02-17

## 2021-02-17 ENCOUNTER — APPOINTMENT (OUTPATIENT)
Dept: PULMONOLOGY | Facility: CLINIC | Age: 66
End: 2021-02-17

## 2021-02-17 VITALS
HEIGHT: 75 IN | RESPIRATION RATE: 17 BRPM | SYSTOLIC BLOOD PRESSURE: 120 MMHG | TEMPERATURE: 207.14 F | DIASTOLIC BLOOD PRESSURE: 70 MMHG | HEART RATE: 70 BPM | BODY MASS INDEX: 26.36 KG/M2 | OXYGEN SATURATION: 98 % | WEIGHT: 212 LBS

## 2021-02-17 PROCEDURE — 99214 OFFICE O/P EST MOD 30 MIN: CPT | Mod: 25

## 2021-02-17 PROCEDURE — 94618 PULMONARY STRESS TESTING: CPT

## 2021-02-17 PROCEDURE — ZZZZZ: CPT

## 2021-02-17 PROCEDURE — 94010 BREATHING CAPACITY TEST: CPT

## 2021-02-17 PROCEDURE — 95012 NITRIC OXIDE EXP GAS DETER: CPT

## 2021-02-17 NOTE — REASON FOR VISIT
[Follow-Up] : a follow-up visit [FreeTextEntry1] : preop - abnormal PFTs, allergic rhinitis, adenocarcinoma of the lung, COPD, GERD, LILY and shortness of breath

## 2021-02-17 NOTE — HISTORY OF PRESENT ILLNESS
[FreeTextEntry1] : Mr. Caro is a 65 year old male presenting to the office for a follow up visit for abnormal PFTs, allergic rhinitis, adenocarcinoma of the lung, COPD, GERD, LILY and shortness of breath. He is preop for liver / gb resection. His chief complaint is awaiting surgery\par -he reports he has a planned gb and liver resection\par -he has seen Dr. Orville Lee, who sent him to Dr. Flores at [facility], and plans to do his surgery\par -he states he has been feeling generally well\par -he states had the possibility of having a bronchoscopy, but it would have been superfluous, and will continue with the surgery\par -He notes His bowels are regular \par -he notes his senses of smell and taste are good \par -he reports he is s/p negative Covid test. He is s/p the first Covid-19 vaccine\par -he states his back is itchy. He has a forced air heater at home, and uses a humidifier\par -he denies any headaches, nausea, vomiting, fever, chills, sweats, chest pain, chest pressure, diarrhea, constipation, dysphagia, dizziness, sour taste in the mouth, leg swelling, leg pain, itchy eyes, itchy ears, heartburn, reflux

## 2021-02-17 NOTE — REVIEW OF SYSTEMS
[Negative] : Endocrine [Fever] : no fever [Chills] : no chills [Chest Discomfort] : no chest discomfort [GERD] : no gerd [Nausea] : no nausea [Vomiting] : no vomiting [Diarrhea] : no diarrhea [Constipation] : no constipation [Dysphagia] : no dysphagia [Dizziness] : no dizziness

## 2021-02-17 NOTE — ADDENDUM
[FreeTextEntry1] : Documented by Turner Mari acting as a scribe for Dr. Cliff Marvin on 02/17/2021.\par \par All medical record entries made by the Scribe were at my, Dr. Cliff Marvin's, direction and personally dictated by me on 02/17/2021. I have reviewed the chart and agree that the record accurately reflects my personal performance of the history, physical exam, assessment and plan. I have also personally directed, reviewed, and agree with the discharge instructions. \par

## 2021-02-17 NOTE — PHYSICAL EXAM
[General Appearance - Well Developed] : well developed [Normal Appearance] : normal appearance [Well Groomed] : well groomed [General Appearance - Well Nourished] : well nourished [No Deformities] : no deformities [Normal Conjunctiva] : the conjunctiva exhibited no abnormalities [General Appearance - In No Acute Distress] : no acute distress [Eyelids - No Xanthelasma] : the eyelids demonstrated no xanthelasmas [Normal Oropharynx] : normal oropharynx [II] : II [Neck Appearance] : the appearance of the neck was normal [Neck Cervical Mass (___cm)] : no neck mass was observed [Jugular Venous Distention Increased] : there was no jugular-venous distention [Thyroid Nodule] : there were no palpable thyroid nodules [Thyroid Diffuse Enlargement] : the thyroid was not enlarged [Heart Rate And Rhythm] : heart rate and rhythm were normal [Heart Sounds] : normal S1 and S2 [Murmurs] : no murmurs present [Respiration, Rhythm And Depth] : normal respiratory rhythm and effort [Exaggerated Use Of Accessory Muscles For Inspiration] : no accessory muscle use [Auscultation Breath Sounds / Voice Sounds] : lungs were clear to auscultation bilaterally [Abdomen Soft] : soft [Abdomen Tenderness] : non-tender [Abdomen Mass (___ Cm)] : no abdominal mass palpated [Abnormal Walk] : normal gait [Gait - Sufficient For Exercise Testing] : the gait was sufficient for exercise testing [Nail Clubbing] : no clubbing of the fingernails [Cyanosis, Localized] : no localized cyanosis [Petechial Hemorrhages (___cm)] : no petechial hemorrhages [Skin Color & Pigmentation] : normal skin color and pigmentation [] : no rash [No Venous Stasis] : no venous stasis [Skin Lesions] : no skin lesions [No Skin Ulcers] : no skin ulcer [No Xanthoma] : no  xanthoma was observed [Deep Tendon Reflexes (DTR)] : deep tendon reflexes were 2+ and symmetric [Sensation] : the sensory exam was normal to light touch and pinprick [No Focal Deficits] : no focal deficits [Impaired Insight] : insight and judgment were intact [Oriented To Time, Place, And Person] : oriented to person, place, and time [Affect] : the affect was normal [FreeTextEntry1] : I:E ratio 1:3; clear

## 2021-02-18 ENCOUNTER — RX RENEWAL (OUTPATIENT)
Age: 66
End: 2021-02-18

## 2021-04-16 ENCOUNTER — RX RENEWAL (OUTPATIENT)
Age: 66
End: 2021-04-16

## 2021-06-11 NOTE — ASSESSMENT
[FreeTextEntry1] : Mr. Caro has a history of BPH, hypothyroid, COPD, multiple lung cancers, OSAS, allergy, and GERD. He is doing well from a pulmonary perspective. His number one complaint is prostate issues. \par \par His shortness of breath is multifactorial due to\par - history of COPD from prior smoking\par - restrictive disfunction due to multiple lung surgeries \par - obesity/out of shape \par - vocal cords\par - poor mechanics of breathing\par \par Problem 1: COPD\par -nebulizer with albuterol up to QID (0.83) \par - continue Symbicort 160 2 inhalations BID\par - continue Incruse 1 inhalation QD (after Symbicort )\par - follow up with pulmonary rehab\par \par -COPD is a progressive disease and although it can’t be cured , appropriate management can slow its progression, reduce frequency and severity of exacerbations, and improve symptoms and the patient quality of life. Hospitalizations are the greatest contributor to the total COPD costs and account for up to 87% of total COPD related costs. Exacerbations are the main cause of admissions and subsequently account for the 40-75% of COPD costs. Inhaled maintenance therapy reduces the incidence of exacerbations in patients with stable COPD. Incorrect inhaler use and nonadherence are major obstacles to achieving COPD treatment goals. Many COPD patients have challenges (impaired inhalation, limited dexterity, reduced cognition: that limit their ability to correctly use their COPD treatment devices resulting in reduced symptom control. Of most importance is smoking cessation and early intervention with respiratory illnesses and contemplation for pulmonary rehab to enhance quality of life. \par \par Problem 2: Obesity\par -Weight loss, exercise, and diet control were discussed and are highly encouraged. Treatment options were given such as, aqua therapy, and contacting a nutritionist. Recommended to use the elliptical, stationary bike, less use of treadmill. Obesity is associated with worsening asthma, shortness of breath, and potential for cardiac disease, diabetes, and other underlying medical conditions. \par \par Problem 3: allergies/sinuses\par - continue Flonase 1 spray each nostril BID\par - Off Xyzal 5 mg QHS due to prostate issues. \par \par -Environmental measures for allergies were encouraged including mattress and pillow cover, air purifier, and environmental controls. \par \par Problem 4: GERD\par - continue Omeprazole 40 mg BID (pre-breakfast/pre-dinner) \par -Rule of 2s: avoid eating too much, eating too late, eating too spicy, eating two hours before bed\par -Things to avoid including overeating, spicy foods, tight clothing, eating within three hours of bed, this list is not all inclusive. \par -For treatment of reflux, possible options discussed including diet control, H2 blockers, PPIs, as well as coating motility agents discussed as treatment options. Timing of meals and proximity of last meal to sleep were discussed. If symptoms persist, a formal gastrointestinal evaluation is needed. \par \par Problem 5: sleep apnea,\par - side sleeping (positional change)\par -has refused treatment ; chinstrap\par \par -Sleep apnea is associated with adverse clinical consequences which an affect most organ systems. Cardiovascular disease risk includes arrhythmias, atrial fibrillation, hypertension, coronary artery disease, and stroke. Metabolic disorders include diabetes type 2, non-alcoholic fatty liver disease. Mood disorder especially depression; and cognitive decline especially in the elderly. Associations with chronic reflux/Zamudio’s esophagus some but not all inclusive. \par -Reasons to assess this include arousal consistent with hypopnea; respiratory events most prominent in REM sleep or supine position; therefore sleep staging and body position are important for accurate diagnosis and estimation of AHI. \par \par problem 6: lung cancer screening\par -recommended follow up chest CT yearly, next CT due in Jan 2020\par \par Lung cancer screening is recommended for people between the ages of 55 and 80 with prior 30+ pack year smoking histories. There is irrefutable evidence for realization of lung cancer screening based on two large randomized control trials demonstrating relative reduction in lung cancer mortality for patients undergoing low-dose CT scanning. Risks and benefits reviewed with the patient \par \par problem 7: health maintenance \par -recommended to go to pulmonary rehab\par -he is has Xanax 0.5 mg PRN for anxiety\par -received influenza vaccination 2019\par -recommended strep pneumonia vaccines: Prevnar-13 vaccine, followed by Pneumo vaccine 23 one year following\par -recommended early intervention for URIs\par -recommended regular osteoporosis evaluations\par -recommended early dermatological evaluations\par -recommended after the age of 50 to the age of 70, colonoscopy every 5 years \par  \par \par F/U in 4-6 months\par He is encouraged to call with any questions, concerns, and changes. \par  - Mild hypokalemia present on admission, repleted with PO 40mEq tab  - Hypocalcemia present, however when corrected for hypoalbuminemia, Ca 8.7 WNL  - Monitor daily electrolytes - Mild hypokalemia present on admission, likely due to patient report of diarrhea, repleted with PO 40mEq tab  - Hypocalcemia present, however when corrected for hypoalbuminemia, Ca 8.7 WNL  - Monitor daily electrolytes

## 2021-07-16 ENCOUNTER — APPOINTMENT (OUTPATIENT)
Dept: CT IMAGING | Facility: CLINIC | Age: 66
End: 2021-07-16
Payer: MEDICARE

## 2021-07-16 ENCOUNTER — OUTPATIENT (OUTPATIENT)
Dept: OUTPATIENT SERVICES | Facility: HOSPITAL | Age: 66
LOS: 1 days | End: 2021-07-16
Payer: MEDICARE

## 2021-07-16 ENCOUNTER — APPOINTMENT (OUTPATIENT)
Dept: ULTRASOUND IMAGING | Facility: CLINIC | Age: 66
End: 2021-07-16
Payer: MEDICARE

## 2021-07-16 DIAGNOSIS — E89.0 POSTPROCEDURAL HYPOTHYROIDISM: Chronic | ICD-10-CM

## 2021-07-16 DIAGNOSIS — Z98.890 OTHER SPECIFIED POSTPROCEDURAL STATES: Chronic | ICD-10-CM

## 2021-07-16 DIAGNOSIS — R93.89 ABNORMAL FINDINGS ON DIAGNOSTIC IMAGING OF OTHER SPECIFIED BODY STRUCTURES: ICD-10-CM

## 2021-07-16 DIAGNOSIS — C34.92 MALIGNANT NEOPLASM OF UNSPECIFIED PART OF LEFT BRONCHUS OR LUNG: Chronic | ICD-10-CM

## 2021-07-16 PROCEDURE — 76536 US EXAM OF HEAD AND NECK: CPT

## 2021-07-16 PROCEDURE — 76536 US EXAM OF HEAD AND NECK: CPT | Mod: 26

## 2021-07-16 PROCEDURE — 71250 CT THORAX DX C-: CPT

## 2021-07-16 PROCEDURE — 71250 CT THORAX DX C-: CPT | Mod: 26,MH

## 2021-07-19 NOTE — H&P PST ADULT - TEMPERATURE IN FAHRENHEIT (DEGREES F)
LOS: 1 day   Patient Care Team:  Soumya Muñoz APRN as PCP - General (Family Medicine)      Chief Complaint: Possible abscess and cellulitis posterior neck      Interval History: Patient with no complaints, no significant pain in the neck and there is no progression of cellulitis in the neck region.  No fever or chills.  Ultrasound the area still pending.    Patient Complaints: None    History taken from: patient    Vital Signs  Temp:  [97.4 °F (36.3 °C)-98.2 °F (36.8 °C)] 97.4 °F (36.3 °C)  Heart Rate:  [53-67] 60  Resp:  [20] 20  BP: (114-144)/(58-79) 116/58    Physical Exam:     General Appearance:    Alert, cooperative, in no acute distress   Head:    Normocephalic, without obvious abnormality, atraumatic  Neck-posterior area with 12 x 12 cm area of ecchymosis and cellulitis.  No fluctuance and slightly warm to the touch.  No definite abscess palpated.   Lungs:     Clear to auscultation,respirations regular, even and                  unlabored    Heart:    Regular rhythm and normal rate, normal S1 and S2, no            murmur, no gallop, no rub, no click   Abdomen:     Normal bowel sounds, no masses, no organomegaly, soft        non-tender, non-distended, no guarding, no rebound                tenderness   Extremities:   Moves all extremities well, no edema, no cyanosis, no             redness   Pulses:   Pulses palpable and equal bilaterally   Skin:   No bleeding, bruising or rash        Results Review:       Lab Results (last 24 hours)     Procedure Component Value Units Date/Time    CBC & Differential [[id number]]  (Abnormal) Collected: 07/19/21 0558    Specimen: Blood Updated: 07/19/21 0647    Narrative:      The following orders were created for panel order CBC & Differential.  Procedure                               Abnormality         Status                     ---------                               -----------         ------                     CBC Auto Differential[[id number]]        Abnormal             Final result                 Please view results for these tests on the individual orders.    CBC Auto Differential [[id number]]  (Abnormal) Collected: 07/19/21 0558    Specimen: Blood Updated: 07/19/21 0647     WBC 11.41 10*3/mm3      RBC 4.81 10*6/mm3      Hemoglobin 14.6 g/dL      Hematocrit 46.6 %      MCV 96.9 fL      MCH 30.4 pg      MCHC 31.3 g/dL      RDW 15.6 %      RDW-SD 55.4 fl      MPV 11.5 fL      Platelets 215 10*3/mm3      Neutrophil % 76.5 %      Lymphocyte % 14.6 %      Monocyte % 8.3 %      Eosinophil % 0.0 %      Basophil % 0.2 %      Immature Grans % 0.4 %      Neutrophils, Absolute 8.72 10*3/mm3      Lymphocytes, Absolute 1.67 10*3/mm3      Monocytes, Absolute 0.95 10*3/mm3      Eosinophils, Absolute 0.00 10*3/mm3      Basophils, Absolute 0.02 10*3/mm3      Immature Grans, Absolute 0.05 10*3/mm3      nRBC 0.0 /100 WBC     Basic Metabolic Panel [[id number]]  (Abnormal) Collected: 07/19/21 0558    Specimen: Blood Updated: 07/19/21 0647     Glucose 97 mg/dL      BUN 37 mg/dL      Creatinine 1.56 mg/dL      Sodium 142 mmol/L      Potassium 4.1 mmol/L      Chloride 99 mmol/L      CO2 29.7 mmol/L      Calcium 9.9 mg/dL      eGFR Non African Amer 34 mL/min/1.73      BUN/Creatinine Ratio 23.7     Anion Gap 13.3 mmol/L     Narrative:      GFR Normal >60  Chronic Kidney Disease <60  Kidney Failure <15      POC Glucose Once [[id number]]  (Normal) Collected: 07/19/21 0604    Specimen: Blood Updated: 07/19/21 0607     Glucose 85 mg/dL      Comment: Serial Number: [id number]:  [id number]       Blood Culture With BASIL - Blood, Arm, Left [[id number]] Collected: 07/18/21 0157    Specimen: Blood from Arm, Left Updated: 07/19/21 0245     Blood Culture No growth at 24 hours    Blood Culture With BASIL - Blood, Hand, Left [[id number]] Collected: 07/18/21 0003    Specimen: Blood from Hand, Left Updated: 07/19/21 0030     Blood Culture No growth at 24 hours    MRSA Screen, PCR (Inpatient) - Swab, Nares [[id number]]   (Normal) Collected: 07/18/21 1256    Specimen: Swab from Nares Updated: 07/18/21 2211     MRSA PCR No MRSA Detected    POC Glucose Once [[id number]]  (Abnormal) Collected: 07/18/21 2021    Specimen: Blood Updated: 07/18/21 2127     Glucose 161 mg/dL      Comment: Serial Number: [id number]:  [id number]       POC Glucose Once [[id number]]  (Abnormal) Collected: 07/18/21 1636    Specimen: Blood Updated: 07/18/21 1647     Glucose 69 mg/dL      Comment: Serial Number: [id number]:  [id number]       Respiratory Culture - Sputum, Cough [[id number]] Collected: 07/18/21 1256    Specimen: Sputum from Cough Updated: 07/18/21 1340              Assessment/Plan       Acute on chronic respiratory failure with hypoxia and hypercapnia (CMS/HCC)    Obesity    Type 2 diabetes mellitus (CMS/HCC)    COPD (chronic obstructive pulmonary disease) (CMS/HCC)    Cor pulmonale (chronic)    ARIANNA (obstructive sleep apnea)    Cellulitis    History of deep vein thrombosis (DVT) of lower extremity    Diastolic CHF, acute (CMS/HCC)      Cellulitis and tissue edema posterior neck region, no evidence of fluctuance or definite abscess.  Awaiting ultrasound to confirm presence of fluid that needs to be drained.  Continue IV antibiotics and heat compresses.      Joseph Velásquez MD  07/19/21  09:19 EDT     98.1

## 2021-07-20 ENCOUNTER — TRANSCRIPTION ENCOUNTER (OUTPATIENT)
Age: 66
End: 2021-07-20

## 2021-07-20 DIAGNOSIS — R91.1 SOLITARY PULMONARY NODULE: ICD-10-CM

## 2021-07-22 ENCOUNTER — APPOINTMENT (OUTPATIENT)
Dept: PULMONOLOGY | Facility: CLINIC | Age: 66
End: 2021-07-22
Payer: MEDICARE

## 2021-07-22 VITALS
HEIGHT: 75 IN | SYSTOLIC BLOOD PRESSURE: 120 MMHG | DIASTOLIC BLOOD PRESSURE: 60 MMHG | WEIGHT: 202 LBS | HEART RATE: 102 BPM | TEMPERATURE: 207.86 F | BODY MASS INDEX: 25.12 KG/M2 | RESPIRATION RATE: 16 BRPM | OXYGEN SATURATION: 98 %

## 2021-07-22 PROCEDURE — 94618 PULMONARY STRESS TESTING: CPT

## 2021-07-22 PROCEDURE — 94727 GAS DIL/WSHOT DETER LNG VOL: CPT

## 2021-07-22 PROCEDURE — 94010 BREATHING CAPACITY TEST: CPT

## 2021-07-22 PROCEDURE — 99214 OFFICE O/P EST MOD 30 MIN: CPT | Mod: 25

## 2021-07-22 PROCEDURE — 94729 DIFFUSING CAPACITY: CPT

## 2021-07-22 PROCEDURE — ZZZZZ: CPT

## 2021-07-22 NOTE — HISTORY OF PRESENT ILLNESS
[FreeTextEntry1] : Mr. Caro is a 65 year old male presenting to the office for a follow up visit for abnormal PFTs, allergic rhinitis, adenocarcinoma of the lung, COPD, GERD, LILY and shortness of breath. He is preop for liver / gb resection. His chief complaint is awaiting surgery\par -he notes being s/p abdominal surgery\par -he notes feeling okay overall\par -he notes some pain in his upper back sometimes\par -he notes his jaw clicks sometimes\par -he notes he has been hoarse for years\par -he notes tolerating the abdominal surgery well\par -he notes they found liver stones during the surgery\par -he notes coughing every once in a while but this is regular for him\par -he notes going to a doctor who saw fluid in his ear and was given a ZPack\par \par patient denies any headaches, nausea, vomiting, fever, chills, sweats, chest pain, chest pressure, palpitations, wheezing, fatigue, diarrhea, constipation, dysphagia, myalgias, dizziness, leg swelling, leg pain, itchy eyes, itchy ears, heartburn, reflux or sour taste in the mouth

## 2021-07-22 NOTE — REASON FOR VISIT
[Follow-Up] : a follow-up visit [Spouse] : spouse [FreeTextEntry1] : preop - abnormal PFTs, allergic rhinitis, adenocarcinoma of the lung, COPD, GERD, LILY and shortness of breath

## 2021-07-22 NOTE — PHYSICAL EXAM
[No Acute Distress] : no acute distress [Normal Oropharynx] : normal oropharynx [Normal Appearance] : normal appearance [No Neck Mass] : no neck mass [Normal Rate/Rhythm] : normal rate/rhythm [Normal S1, S2] : normal s1, s2 [No Murmurs] : no murmurs [No Resp Distress] : no resp distress [Clear to Auscultation Bilaterally] : clear to auscultation bilaterally [No Abnormalities] : no abnormalities [Benign] : benign [Normal Gait] : normal gait [No Clubbing] : no clubbing [No Cyanosis] : no cyanosis [No Edema] : no edema [FROM] : FROM [Normal Color/ Pigmentation] : normal color/ pigmentation [No Focal Deficits] : no focal deficits [Oriented x3] : oriented x3 [Normal Affect] : normal affect [II] : Mallampati Class: II [Kyphosis] : kyphosis [TextBox_68] : I:E 1:3; Clear  [TextBox_89] : s/p abdominal surgery

## 2021-07-22 NOTE — ADDENDUM
[FreeTextEntry1] : Documented by Turner Elizabeth acting as a scribe for Dr. Cliff Marvin on (07/22/2021).\par \par All medical record entries made by the Scribe were at my, Dr. Cliff Marvin's, direction and personally dictated by me on (07/22/2021). I have reviewed the chart and agree that the record accurately reflects my personal performance of the history, physical exam, assessment and plan. I have also personally directed, reviewed, and agree with the discharge instructions.\par

## 2021-07-22 NOTE — PROCEDURE
[FreeTextEntry1] : CT (7/18/2021) reveals 1 cm left upper lobe nodularity medially along suture margin is minimally enlarged from 1/22/2021 but more enlarged from 2019. PET/CT recommended for complete evaluation.\par \par Full PFT revealed moderate restrictive and moderate obstructive dysfunction, with a FEV1 of 1.68L, which is 40% of predicted, moderately reduced lung volumes, and a diffusion of 22.8, which is 88% of predicted, with a normal flow volume loop \par \par 6 minute walk test reveals moderate dyspnea or fatigue; walked 586.8 meters\par  \par -Images and procedures reviewed in detail and discussed with patient.

## 2021-07-22 NOTE — ASSESSMENT
[FreeTextEntry1] : Mr. Caro is a 65 year old Male who has a history of BPH, hypothyroid, COPD, multiple lung cancers, OSAS, allergy, and GERD. He is doing well from a pulmonary perspective. His number one complaint is Abnormal CTL IMELDA 1 cm nodule - suture line: DDx CA/ \par \par ************************PREOP CLEARANCE FOR THORACIC SURGERY*********************\par -at this point in time there are no absolute pulmonary contraindications to go forward with the planned procedure \par -at the time of surgery s/he should have optimal pain control, incentive spirometry, early ambulation, DVT and GI prophylaxis.\par *************************************************************************************************************\par \par His shortness of breath is multifactorial due to\par - history of COPD from prior smoking\par - restrictive disfunction due to multiple lung surgeries \par - obesity/out of shape \par - vocal cords\par - poor mechanics of breathing\par \par Problem 1: COPD\par -nebulizer with albuterol up to QID (0.83) \par - continue Symbicort 160 2 inhalations BID\par - continue Incruse 1 inhalation QD (after Symbicort )\par - follow up with pulmonary rehab\par \par -COPD is a progressive disease and although it can’t be cured , appropriate management can slow its progression, reduce frequency and severity of exacerbations, and improve symptoms and the patient quality of life. Hospitalizations are the greatest contributor to the total COPD costs and account for up to 87% of total COPD related costs. Exacerbations are the main cause of admissions and subsequently account for the 40-75% of COPD costs. Inhaled maintenance therapy reduces the incidence of exacerbations in patients with stable COPD. Incorrect inhaler use and nonadherence are major obstacles to achieving COPD treatment goals. Many COPD patients have challenges (impaired inhalation, limited dexterity, reduced cognition: that limit their ability to correctly use their COPD treatment devices resulting in reduced symptom control. Of most importance is smoking cessation and early intervention with respiratory illnesses and contemplation for pulmonary rehab to enhance quality of life. \par \par Problem 2: Obesity (improved)\par -Weight loss, exercise, and diet control were discussed and are highly encouraged. Treatment options were given such as, aqua therapy, and contacting a nutritionist. Recommended to use the elliptical, stationary bike, less use of treadmill. Obesity is associated with worsening asthma, shortness of breath, and potential for cardiac disease, diabetes, and other underlying medical conditions. \par \par Problem 3: allergies/sinuses\par - continue Astelin 0.15% 1 sniff/nostril BID\par - continue Flonase 1 spray each nostril BID\par - Off Xyzal 5 mg QHS due to prostate issues. \par -Environmental measures for allergies were encouraged including mattress and pillow cover, air purifier, and environmental controls. \par \par Problem 4: GERD\par - continue Omeprazole 40 mg BID (pre-breakfast/pre-dinner) \par -Add Pepcid 40mg QHS \par -Rule of 2s: avoid eating too much, eating too late, eating too spicy, eating two hours before bed\par -Things to avoid including overeating, spicy foods, tight clothing, eating within three hours of bed, this list is not all inclusive. \par -For treatment of reflux, possible options discussed including diet control, H2 blockers, PPIs, as well as coating motility agents discussed as treatment options. Timing of meals and proximity of last meal to sleep were discussed. If symptoms persist, a formal gastrointestinal evaluation is needed. \par \par Problem 5: sleep apnea,\par - side sleeping (positional change)\par -has refused treatment ; chinstrap\par -Sleep apnea is associated with adverse clinical consequences which an affect most organ systems. Cardiovascular disease risk includes arrhythmias, atrial fibrillation, hypertension, coronary artery disease, and stroke. Metabolic disorders include diabetes type 2, non-alcoholic fatty liver disease. Mood disorder especially depression; and cognitive decline especially in the elderly. Associations with chronic reflux/Zamudio’s esophagus some but not all inclusive. \par -Reasons to assess this include arousal consistent with hypopnea; respiratory events most prominent in REM sleep or supine position; therefore sleep staging and body position are important for accurate diagnosis and estimation of AHI. \par \par problem 6: lung cancer screening\par -recommended follow up chest CT yearly, s/p CT 7/18/2021 . PET-CT pending\par -Recommend CTS evaluation with Dr. Hansen\par - Lung cancer screening is recommended for people between the ages of 55 and 80 with prior 30+ pack year smoking histories. There is irrefutable evidence for realization of lung cancer screening based on two large randomized control trials demonstrating relative reduction in lung cancer mortality for patients undergoing low-dose CT scanning. Risks and benefits reviewed with the patient \par \par Problem 7a: COVID-19 precautionary Immune Support Recommendations:\par -OTC Vitamin C 500mg BID \par -OTC Quercetin 250-500mg BID \par -OTC Zinc 75-100mg per day \par -OTC Melatonin 1 or 2mg a night \par -OTC Vitamin D 1-4000mg per day \par -OTC Tonic Water 8oz per day\par -Water 0.5-1 gallon per day\par \par problem 7: health maintenance \par -recommended to go to pulmonary rehab\par -he is has Xanax 0.5 mg PRN for anxiety\par -received influenza vaccination 2019\par -recommended strep pneumonia vaccines: Prevnar-13 vaccine, followed by Pneumo vaccine 23 one year following\par -recommended early intervention for URIs\par -recommended regular osteoporosis evaluations\par -recommended early dermatological evaluations\par -recommended after the age of 50 to the age of 70, colonoscopy every 5 years \par  \par \par F/U in 4-6 months\par He is encouraged to call with any questions, concerns, and changes. \par

## 2021-07-26 NOTE — PRE PROCEDURE NOTE - PROCEDURE CANDIDATE
07/26/21 1300   General Information   Type of Visit Initial OP Ortho PT Evaluation   Start of Care Date 07/26/21   Referring Physician Dr. Nguyen   Patient/Family Goals Statement pt would like to progress to going up/down stairs with SEC and walking with SEC   Orders Evaluate and Treat   Date of Order 07/06/21   Certification Required? Yes   Medical Diagnosis S72.22XD   Surgical/Medical history reviewed Yes   Precautions/Limitations no known precautions/limitations   General Information Comments Pt is a 71 year female referred to skilled PT services following a fall in the kitchen resulting in a L femur fracture around 5/14/21 and surgical fixation on 5/17. Pt then went to TCU for 1-2 weeks, from there discharged to home with homecare therapy until this last week. Pt is currently walking with 4WW, was previously walking with SEC and wants to get back to that level. Per pt hip does not bother her, it is her knee that is most painful and has only been bothering her since the fracture.    Presentation and Etiology   Pertinent history of current problem (include personal factors and/or comorbidities that impact the POC) PMH: stroke in 2019, heart problems, bladder control, arthritis, smoking, bladder stimulator.    Impairments A. Pain;C. Swelling;E. Decreased flexibility;Q. Dizziness   Functional Limitations perform activities of daily living;perform desired leisure / sports activities   Symptom Location L knee   How/Where did it occur With a fall   Onset date of current episode/exacerbation 05/14/21   Chronicity New   Pain rating (0-10 point scale) Best (/10);Worst (/10)   Best (/10) 0/10   Worst (/10) 10/10  (likely is a 6-7/10 as pt can return to sleep)   Pain quality C. Aching   Frequency of pain/symptoms B. Intermittent   Pain/symptoms are: Worse during the night   Pain/symptoms exacerbated by B. Walking;G. Certain positions;M. Other  (sleeping)   Pain/symptoms eased by E. Changing positions;H. Cold;I. OTC  medication(s)   Progression of symptoms since onset: Improved   Prior Level of Function   Prior Level of Function-Mobility independent with SEC   Prior Level of Function-ADLs independent   Functional Level Prior Comment pt using 4WW now, was using SEC intermittently since stroke but has a hard time recalling for how long.    Current Level of Function   Current Community Support Family/friend caregiver   Patient role/employment history F. Retired   Living environment House/townhome   Home/community accessibility has stairs to get to outdoors    Current equipment-Gait/Locomotion Walker   Fall Risk Screen   Fall screen completed by PT   Have you fallen 2 or more times in the past year? Yes   Have you fallen and had an injury in the past year? Yes   Is patient a fall risk? Yes;Department fall risk interventions implemented   Fall screen comments Pt has fallen 3 times in the last year   Abuse Screen (yes response referral indicated)   Feels Unsafe at Home or Work/School no   Feels Threatened by Someone no   Does Anyone Try to Keep You From Having Contact with Others or Doing Things Outside Your Home? no   Physical Signs of Abuse Present no   System Outcome Measures   Outcome Measures   (30/56 on Coyle, 25/80 on LEFI, 8 STS in 30 sec/UE assist)   Hip Objective Findings   Side (if bilateral, select both right and left) Left   Observation forward flexion, poor control of body in motion.    Posture slumped, heavy forwar lean in standing   Gait/Locomotion with 4WW gait with heavy leaning on walker, increased lateral sway and less stance time on L. SEC: heavy lean on SEC, heavy lean towards R side, unstable and required CGA.    Balance/Proprioception (Single Leg Stance) unable to stand on L at all due to pain, R LE able to attempt but unable to complete SLS   Hip ROM Comments no pain in supine with ROM   Hip/Knee Strength Comments R hip flexion 3/5, R hip ABD/ADD 4/5, R knee flexion/ext 4+/5.    Straight Leg Raise Test equal B    Hip Special Tests Comments ligament assessment on L, no noted issues.    Palpation overall stiff L side and very weak. increased tissue tension in L hamstring/glut/quads and increased edema around L knee joint diffusely.    Accessory Motion/Joint Mobility limited in L hip but not painfu/just stiff.    Left Hip Flexion PROM limited by adipose tissue   Left Hip Ext PROM limited   Left Hip Flexion Strength 3-/5   Left Hip Abduction Strength 4/5   Left Hip Extension Strength 4/5   Left Knee Flexion Strength 4/5   Left Knee Extension Strength 4/5   Left Hamstring Flexibility limited   Planned Therapy Interventions   Planned Therapy Interventions gait training;joint mobilization;balance training;manual therapy;motor coordination training;neuromuscular re-education;ROM;strengthening;stretching;transfer training   Planned Modality Interventions   Planned Modality Interventions Cryotherapy;Hot packs;Ultrasound   Planned Modality Interventions Comments vasopneumatic compression   Clinical Impression   Criteria for Skilled Therapeutic Interventions Met yes, treatment indicated   PT Diagnosis decline in functional mobility, generalized LE weakness/instability.    Influenced by the following impairments weakness, pain, swelling, decreased ROM, poor motor control and high falls risk   Functional limitations due to impairments increased reliance on 4WW, pain with stairs, instability and increased falls risk during daily mobility.    Clinical Presentation Stable/Uncomplicated   Clinical Presentation Rationale pt symptoms consistent with diagnosis.    Clinical Decision Making (Complexity) Low complexity   Therapy Frequency other (see comments)   Predicted Duration of Therapy Intervention (days/wks) 1-2x/week x 10 weeks   Risk & Benefits of therapy have been explained Yes   Patient, Family & other staff in agreement with plan of care Yes   Clinical Impression Comments Pt ia a 71 year old female referred to skilled PT services from  Yes homecare therapy following a fall resulting in a L hip fracture and fixation in May with TCU stay after. Pt reports a significant increase in reliance on her 4WW and L knee pain following fall is more of an issue than the L hip is. Pt currently has had a significant decline in independence since her fall and would like to return to using SEC and lower her falls risk. Pt can benefit from skilled PT services to return to baseline function.    Education Assessment   Preferred Learning Style Listening;Demonstration;Reading   Barriers to Learning Cognitive  (poor memory)   ORTHO GOALS   PT Ortho Eval Goals 1;2;3;4   Ortho Goal 1   Goal Identifier balance   Goal Description pt will complete B single leg stance for 3 sec without UE assist in order to improve stability for gait with SEC.    Target Date 09/06/21   Ortho Goal 2   Goal Identifier Strength   Goal Description Pt will complete 4 sit<>stands from average height chair without UE assist in 30 sec to progress functional stability for gait and transfers.    Target Date 09/20/21   Ortho Goal 3   Goal Identifier stairs   Goal Description Pt will ascend/descend 12 stairs with railing and SEC with SBA to progress towards ability to get in/out of home safely.    Target Date 09/06/21   Ortho Goal 4   Goal Identifier Gait   Goal Description Pt will ambulate with SEC independently across 200 feet of outdoor terrain to progress towards accessing the community and returning to prior function.    Target Date 10/04/21   Total Evaluation Time   PT Eval, Low Complexity Minutes (80397) 30   Therapy Certification   Certification date from 07/26/21   Certification date to 10/04/21   Medical Diagnosis S72.22XD

## 2021-08-03 ENCOUNTER — OUTPATIENT (OUTPATIENT)
Dept: OUTPATIENT SERVICES | Facility: HOSPITAL | Age: 66
LOS: 1 days | End: 2021-08-03

## 2021-08-03 ENCOUNTER — APPOINTMENT (OUTPATIENT)
Dept: NUCLEAR MEDICINE | Facility: CLINIC | Age: 66
End: 2021-08-03
Payer: MEDICARE

## 2021-08-03 DIAGNOSIS — C34.92 MALIGNANT NEOPLASM OF UNSPECIFIED PART OF LEFT BRONCHUS OR LUNG: Chronic | ICD-10-CM

## 2021-08-03 DIAGNOSIS — Z98.890 OTHER SPECIFIED POSTPROCEDURAL STATES: Chronic | ICD-10-CM

## 2021-08-03 DIAGNOSIS — C34.90 MALIGNANT NEOPLASM OF UNSPECIFIED PART OF UNSPECIFIED BRONCHUS OR LUNG: ICD-10-CM

## 2021-08-03 DIAGNOSIS — E89.0 POSTPROCEDURAL HYPOTHYROIDISM: Chronic | ICD-10-CM

## 2021-08-03 PROCEDURE — 78815 PET IMAGE W/CT SKULL-THIGH: CPT | Mod: 26,PI

## 2021-08-05 ENCOUNTER — NON-APPOINTMENT (OUTPATIENT)
Age: 66
End: 2021-08-05

## 2021-08-06 ENCOUNTER — TRANSCRIPTION ENCOUNTER (OUTPATIENT)
Age: 66
End: 2021-08-06

## 2021-08-20 ENCOUNTER — TRANSCRIPTION ENCOUNTER (OUTPATIENT)
Age: 66
End: 2021-08-20

## 2021-09-22 ENCOUNTER — TRANSCRIPTION ENCOUNTER (OUTPATIENT)
Age: 66
End: 2021-09-22

## 2021-09-22 RX ORDER — LEVOCETIRIZINE DIHYDROCHLORIDE 5 MG/1
5 TABLET ORAL
Qty: 90 | Refills: 1 | Status: ACTIVE | COMMUNITY
Start: 2018-07-19 | End: 1900-01-01

## 2021-09-28 NOTE — H&P PST ADULT - NS MD HP HEP C STATUS
Implemented All Universal Safety Interventions:  [place] to call system. Call bell, personal items and telephone within reach. Instruct patient to call for assistance. Room bathroom lighting operational. Non-slip footwear when patient is off stretcher. Physically safe environment: no spills, clutter or unnecessary equipment. Stretcher in lowest position, wheels locked, appropriate side rails in place.
Negative

## 2021-10-11 ENCOUNTER — APPOINTMENT (OUTPATIENT)
Dept: DISASTER EMERGENCY | Facility: CLINIC | Age: 66
End: 2021-10-11

## 2021-10-11 DIAGNOSIS — Z01.818 ENCOUNTER FOR OTHER PREPROCEDURAL EXAMINATION: ICD-10-CM

## 2021-10-12 LAB — SARS-COV-2 N GENE NPH QL NAA+PROBE: NOT DETECTED

## 2021-10-13 NOTE — PRE PROCEDURE NOTE - PRE PROCEDURE EVALUATION
Attending Physician:       Mike Lee                     Procedure: Colonoscopy    Indication for Procedure: History of colon polyps  ________________________________________________________  PAST MEDICAL & SURGICAL HISTORY:  COPD (Chronic Obstructive Pulmonary Disease)  treated with inhalers    Lung Cancer  right upper lobe 1997    LILY (Obstructive Sleep Apnea)  does not use CPAP, last used it 15 years ago    Pneumonia  1990    BPH (Benign Prostatic Hyperplasia)    Anxiety    GERD (Gastroesophageal Reflux Disease)    Lung cancer, left  upper lobe 2010    Umbilical hernia    Abdominal hernia    Thyroid nodule    Unspecified cholesteatoma, right ear  12/2016    History of chemotherapy  and radiation 1998    Zamudio's esophagus without dysplasia    History of colon polyps    Former smoker    S/P Lobectomy of Lung  right upper 1997, [facility]    Mediastinum Neoplasm  S/P mediastinoscopy 1998    Lung cancer, left  left upper lobe VATS 2010    H/O thyroidectomy  total 4/ 2016    History of prostate surgery  TURP 2014 and 2017    History of lung surgery  2015 benign lesion @ left lung    S/P ear surgery  unspecified cholesteatoma - right tympanomastoidectomy 12/16/2016    Partial liver resection 2/2021 for suspicious abnormal intrahepatic bile duct (pathology = benign)      ALLERGIES:  Clindamycin (SOB)  Silk type surgical tape (hives)    HOME MEDICATIONS:  levothyroxine 200 mcg orally once a day  omeprazole 20 mg oral delayed release capsule: 1 cap(s) orally 2 times a day  Symbicort 160 mcg-4.5 mcg/inh inhalation aerosol: 2 puff(s) inhaled 2 times a day  Vitamin D3 2000 intl units oral capsule: 2 tab(s) orally once a day  Xanax 0.5 mg oral tablet: 1 tab(s) orally , As Needed  Claritin daily  Famotidine 40 mg   Xyzal 2.5 mg daily    AICD/PPM: [ ] yes   [X ] no    PERTINENT LAB DATA:                      PHYSICAL EXAMINATION:    T(C): -- 97.2 F  HR: -- 102  BP: -- 110/78  SpO2: -- 97%    Constitutional: NAD  Respiratory: CTAB/L  Cardiovascular: S1 and S2  Gastrointestinal: BS+, soft, NT/ND except diastasis recti  Extremities: No peripheral edema      ASA Class: I [ ]  II [ ]  III [X ]  IV [ ]    COMMENTS:    The patient is a suitable candidate for the planned procedure unless box checked [ ]  No, explain:

## 2021-10-14 ENCOUNTER — OUTPATIENT (OUTPATIENT)
Dept: OUTPATIENT SERVICES | Facility: HOSPITAL | Age: 66
LOS: 1 days | End: 2021-10-14
Payer: MEDICARE

## 2021-10-14 ENCOUNTER — RESULT REVIEW (OUTPATIENT)
Age: 66
End: 2021-10-14

## 2021-10-14 VITALS
SYSTOLIC BLOOD PRESSURE: 117 MMHG | HEART RATE: 85 BPM | RESPIRATION RATE: 20 BRPM | DIASTOLIC BLOOD PRESSURE: 87 MMHG | OXYGEN SATURATION: 97 %

## 2021-10-14 VITALS
TEMPERATURE: 97 F | SYSTOLIC BLOOD PRESSURE: 126 MMHG | RESPIRATION RATE: 17 BRPM | HEART RATE: 91 BPM | OXYGEN SATURATION: 98 % | WEIGHT: 205.03 LBS | HEIGHT: 76 IN | DIASTOLIC BLOOD PRESSURE: 87 MMHG

## 2021-10-14 DIAGNOSIS — E89.0 POSTPROCEDURAL HYPOTHYROIDISM: Chronic | ICD-10-CM

## 2021-10-14 DIAGNOSIS — Z98.890 OTHER SPECIFIED POSTPROCEDURAL STATES: Chronic | ICD-10-CM

## 2021-10-14 DIAGNOSIS — Z11.59 ENCOUNTER FOR SCREENING FOR OTHER VIRAL DISEASES: ICD-10-CM

## 2021-10-14 DIAGNOSIS — K22.70 BARRETT'S ESOPHAGUS WITHOUT DYSPLASIA: ICD-10-CM

## 2021-10-14 DIAGNOSIS — C34.92 MALIGNANT NEOPLASM OF UNSPECIFIED PART OF LEFT BRONCHUS OR LUNG: Chronic | ICD-10-CM

## 2021-10-14 DIAGNOSIS — Z86.010 PERSONAL HISTORY OF COLONIC POLYPS: ICD-10-CM

## 2021-10-14 PROCEDURE — 45380 COLONOSCOPY AND BIOPSY: CPT | Mod: PT

## 2021-10-14 RX ORDER — LEVOTHYROXINE SODIUM 125 MCG
2 TABLET ORAL
Qty: 0 | Refills: 0 | DISCHARGE

## 2021-10-14 RX ORDER — SODIUM CHLORIDE 9 MG/ML
500 INJECTION INTRAMUSCULAR; INTRAVENOUS; SUBCUTANEOUS
Refills: 0 | Status: COMPLETED | OUTPATIENT
Start: 2021-10-14 | End: 2021-10-14

## 2021-10-14 RX ADMIN — SODIUM CHLORIDE 30 MILLILITER(S): 9 INJECTION INTRAMUSCULAR; INTRAVENOUS; SUBCUTANEOUS at 07:58

## 2021-10-14 NOTE — PRE-ANESTHESIA EVALUATION ADULT - NSANTHAIRWAYFT_ENT_ALL_CORE
FROM of neck, TMD>3FB, Good mouth opening, no tracheal deviation, right neck soft tissue more pronounced than left side

## 2021-10-14 NOTE — ASU PATIENT PROFILE, ADULT - NSICDXPASTSURGICALHX_GEN_ALL_CORE_FT
PAST SURGICAL HISTORY:  H/O thyroidectomy total 4/ 2016    History of lung surgery 2015 benign lesion @ left lung    History of prostate surgery TURP 2014 and 2017    Lung cancer, left left upper lobe VATS 2010    Mediastinum Neoplasm S/P mediastinoscopy 1998    S/P ear surgery unspecified cholesteatoma - right tympanomastoidectomy 12/16/2016    S/P Lobectomy of Lung right upper [facility]

## 2021-10-18 LAB — SURGICAL PATHOLOGY STUDY: SIGNIFICANT CHANGE UP

## 2021-11-12 ENCOUNTER — NON-APPOINTMENT (OUTPATIENT)
Age: 66
End: 2021-11-12

## 2021-12-14 ENCOUNTER — APPOINTMENT (OUTPATIENT)
Dept: CT IMAGING | Facility: CLINIC | Age: 66
End: 2021-12-14
Payer: MEDICARE

## 2021-12-14 ENCOUNTER — APPOINTMENT (OUTPATIENT)
Dept: DERMATOLOGY | Facility: CLINIC | Age: 66
End: 2021-12-14
Payer: MEDICARE

## 2021-12-14 ENCOUNTER — OUTPATIENT (OUTPATIENT)
Dept: OUTPATIENT SERVICES | Facility: HOSPITAL | Age: 66
LOS: 1 days | End: 2021-12-14
Payer: MEDICARE

## 2021-12-14 DIAGNOSIS — E89.0 POSTPROCEDURAL HYPOTHYROIDISM: Chronic | ICD-10-CM

## 2021-12-14 DIAGNOSIS — Z98.890 OTHER SPECIFIED POSTPROCEDURAL STATES: Chronic | ICD-10-CM

## 2021-12-14 DIAGNOSIS — C34.92 MALIGNANT NEOPLASM OF UNSPECIFIED PART OF LEFT BRONCHUS OR LUNG: Chronic | ICD-10-CM

## 2021-12-14 DIAGNOSIS — Z00.00 ENCOUNTER FOR GENERAL ADULT MEDICAL EXAMINATION WITHOUT ABNORMAL FINDINGS: ICD-10-CM

## 2021-12-14 PROCEDURE — 99203 OFFICE O/P NEW LOW 30 MIN: CPT

## 2021-12-14 PROCEDURE — 71250 CT THORAX DX C-: CPT | Mod: MH

## 2021-12-14 PROCEDURE — 71250 CT THORAX DX C-: CPT | Mod: 26,MH

## 2022-01-07 ENCOUNTER — APPOINTMENT (OUTPATIENT)
Dept: PULMONOLOGY | Facility: CLINIC | Age: 67
End: 2022-01-07

## 2022-01-15 ENCOUNTER — APPOINTMENT (OUTPATIENT)
Dept: DISASTER EMERGENCY | Facility: CLINIC | Age: 67
End: 2022-01-15

## 2022-01-18 ENCOUNTER — APPOINTMENT (OUTPATIENT)
Dept: PULMONOLOGY | Facility: CLINIC | Age: 67
End: 2022-01-18
Payer: MEDICARE

## 2022-01-18 VITALS
HEART RATE: 93 BPM | SYSTOLIC BLOOD PRESSURE: 114 MMHG | TEMPERATURE: 98 F | OXYGEN SATURATION: 97 % | RESPIRATION RATE: 16 BRPM | BODY MASS INDEX: 25.09 KG/M2 | DIASTOLIC BLOOD PRESSURE: 66 MMHG | HEIGHT: 76 IN | WEIGHT: 206 LBS

## 2022-01-18 DIAGNOSIS — Z01.811 ENCOUNTER FOR PREPROCEDURAL RESPIRATORY EXAMINATION: ICD-10-CM

## 2022-01-18 LAB — SARS-COV-2 N GENE NPH QL NAA+PROBE: NOT DETECTED

## 2022-01-18 PROCEDURE — 94010 BREATHING CAPACITY TEST: CPT

## 2022-01-18 PROCEDURE — 94727 GAS DIL/WSHOT DETER LNG VOL: CPT

## 2022-01-18 PROCEDURE — 99213 OFFICE O/P EST LOW 20 MIN: CPT | Mod: 25

## 2022-01-18 PROCEDURE — 94729 DIFFUSING CAPACITY: CPT

## 2022-01-18 PROCEDURE — 71046 X-RAY EXAM CHEST 2 VIEWS: CPT | Mod: 26

## 2022-01-18 RX ORDER — ALBUTEROL SULFATE 90 UG/1
108 (90 BASE) INHALANT RESPIRATORY (INHALATION)
Qty: 1 | Refills: 1 | Status: ACTIVE | COMMUNITY
Start: 2022-01-18 | End: 1900-01-01

## 2022-01-18 NOTE — HISTORY OF PRESENT ILLNESS
[Former] : former [>= 30 pack years] : >= 30 pack years [Never] : never [TextBox_4] : Mr. Caro is a 66 year old, former smoking, male. He has past medical history of BPH, hypothyroid, COPD, multiple lung cancers, OSAS, allergy, and GERD. He presents for a follow up visit to obtain pre-procedural respiratory clearance for upcoming colonoscopy on Friday, 1/21/22 with GI Interventional Radiology, Dr. Lorne Hernandez of [facility].\par \par Patient notes he is followed by Dr. Lee and recent had a S/P Colonoscopy where a benign polyp was seen.  He notes Dr. Lee was unable to visualize another abnormality during colonoscopy and referred him Dr. Hernandez. \par \par Patient notes he is currently at his baseline breathing. He does admit to SOB on exertion when walking up flights of stairs, but notes this is his norm. \par \par He denies fever/chills, decreased appetite, fatigue, wheezing, chest tightness, SOB @ rest or any other pulmonary concerns at this time. \par  [TextBox_11] : 2 [TextBox_13] : 25 [YearQuit] : 1997

## 2022-01-18 NOTE — REASON FOR VISIT
[Follow-Up] : a follow-up visit [Pre-op Risk Stratification] : pre-op risk stratification [COPD] : COPD [Shortness of Breath] : shortness of breath

## 2022-01-18 NOTE — ASSESSMENT
[FreeTextEntry1] : Mr. Caro is a 66 year old, former smoking, male. He has past medical history of BPH, hypothyroid, COPD, multiple lung cancers, OSAS, allergy, and GERD. He presents for a follow up visit to obtain pre-procedural respiratory clearance for upcoming colonoscopy on Friday, 1/21/22 with GI Interventional Radiology, Dr. Lorne Hernandez of [facility] [place].\par \par 1. Pre-Procedural Resp. Exam:\par - Patient's CXR and PFTs c/w baseline. Patient is asymptomatic of pulmonary concerns at this time. Patient's VS WNL at today's visit.\par - There are no absolute pulmonary contraindications to move forward with planned procedure at this time. \par \par 2. COPD:\par - Add Albuterol 2 puffs Q4-6 H PRN.\par - Continue Symbicort 2 puffs BID; rinse and gargle post use.\par \par Patient to follow up with Dr. Marvin as scheduled.\par Patient to call with further questions and concerns.\par Patient verbalizes understanding of care plan and is agreeable.\par

## 2022-01-18 NOTE — PROCEDURE
[FreeTextEntry1] : PFT's performed in office show severe obstructive lung defect. \par FEV1: 39% \par FEV1/FVC Ratio: 65% \par SSJ54-20%: 22% \par DLCO: 80% \par These numbers are consistent with prior PFTs from 9/2019. \par \par CXR in office; Read by Dr. Marvin. \par Impression: Median Sternotomy appreciated. Normal size heart. Pleural changes.  No infiltrates, effusion or opacities noted.\par

## 2022-01-27 ENCOUNTER — TRANSCRIPTION ENCOUNTER (OUTPATIENT)
Age: 67
End: 2022-01-27

## 2022-01-28 ENCOUNTER — NON-APPOINTMENT (OUTPATIENT)
Age: 67
End: 2022-01-28

## 2022-01-28 ENCOUNTER — APPOINTMENT (OUTPATIENT)
Dept: PULMONOLOGY | Facility: CLINIC | Age: 67
End: 2022-01-28
Payer: MEDICARE

## 2022-01-28 VITALS — WEIGHT: 206 LBS | BODY MASS INDEX: 25.09 KG/M2 | HEIGHT: 76 IN

## 2022-01-28 PROCEDURE — 99214 OFFICE O/P EST MOD 30 MIN: CPT | Mod: 95

## 2022-01-28 NOTE — HISTORY OF PRESENT ILLNESS
[Home] : at home, [unfilled] , at the time of the visit. [Medical Office: ([facility])___] : at the medical office located in  [Verbal consent obtained from patient] : the patient, [unfilled] [FreeTextEntry1] : Mr. Caro is a 66 year old male presenting to the office via video call for a follow up visit for abnormal PFTs, allergic rhinitis, adenocarcinoma of the lung, COPD, GERD, LILY and shortness of breath. He is preop for liver / gb resection. His chief complaint is awaiting surgery\par -s/p colonoscopy that tried to remove a polyp near the ileocecal valve but it was unsuccessful \par -he notes spending two nights in the hospital at [place]\par -he notes he is feeling fine now\par -he notes he was having lower stomach issues but not now\par -he notes the polyp is not cancerous now and he was recommended to remove it\par -he note Dr. Lee recommended the polyp be removed surgically\par -he notes he is seeing Dr. Flores regarding the polyp soon\par -\par \par -patient denies any headaches, nausea, vomiting, fever, chills, sweats, chest pain, chest pressure, palpitations, coughing, wheezing, fatigue, diarrhea, constipation, dysphagia, myalgias, dizziness, leg swelling, leg pain, itchy eyes, itchy ears, heartburn, reflux or sour taste in the mouth

## 2022-01-28 NOTE — ADDENDUM
[FreeTextEntry1] : Documented by Turner Elizabeth acting as a scribe for Dr. Cliff Marvin on 01/28/2022\par \par All medical record entries made by the scribe were at my, Dr. Cliff Marvin's, direction and personally dictated by me on 01/28/2022. I have reviewed the chart and agree that the record accurately reflects my personal performance of the history, physical exam, assessment, and plan. I have also personally directed, reviewed, and agree with the discharge instructions.

## 2022-01-28 NOTE — REASON FOR VISIT
[Follow-Up] : a follow-up visit [Spouse] : spouse [FreeTextEntry1] : video call - preop - abnormal PFTs, allergic rhinitis, adenocarcinoma of the lung, COPD, GERD, LILY and shortness of breath

## 2022-01-28 NOTE — ASSESSMENT
[FreeTextEntry1] : Mr. Caro is a 66 year old Male who has a history of BPH, hypothyroid, COPD, multiple lung cancers, OSAS, allergy, and GERD. He presents via video call. He is doing well from a pulmonary perspective. His number one complaint is Abnormal CTL IMELDA 1 cm nodule - suture line: DDx CA/ awaiting CRS evaluation for colonic polyp\par \par \par His shortness of breath is multifactorial due to\par - history of COPD from prior smoking\par - restrictive disfunction due to multiple lung surgeries \par - obesity/out of shape \par - vocal cords\par - poor mechanics of breathing\par \par Problem 1: COPD\par -nebulizer with albuterol up to QID (0.83) \par - continue Symbicort 160 2 inhalations BID\par - continue Incruse 1 inhalation QD (after Symbicort )\par - follow up with pulmonary rehab\par \par -COPD is a progressive disease and although it can’t be cured , appropriate management can slow its progression, reduce frequency and severity of exacerbations, and improve symptoms and the patient quality of life. Hospitalizations are the greatest contributor to the total COPD costs and account for up to 87% of total COPD related costs. Exacerbations are the main cause of admissions and subsequently account for the 40-75% of COPD costs. Inhaled maintenance therapy reduces the incidence of exacerbations in patients with stable COPD. Incorrect inhaler use and nonadherence are major obstacles to achieving COPD treatment goals. Many COPD patients have challenges (impaired inhalation, limited dexterity, reduced cognition: that limit their ability to correctly use their COPD treatment devices resulting in reduced symptom control. Of most importance is smoking cessation and early intervention with respiratory illnesses and contemplation for pulmonary rehab to enhance quality of life. \par \par Problem 2: Obesity (improved)\par -Weight loss, exercise, and diet control were discussed and are highly encouraged. Treatment options were given such as, aqua therapy, and contacting a nutritionist. Recommended to use the elliptical, stationary bike, less use of treadmill. Obesity is associated with worsening asthma, shortness of breath, and potential for cardiac disease, diabetes, and other underlying medical conditions. \par \par Problem 3: allergies/sinuses\par - continue Astelin 0.15% 1 sniff/nostril BID\par - continue Flonase 1 spray each nostril BID\par - Off Xyzal 5 mg QHS due to prostate issues. \par -Environmental measures for allergies were encouraged including mattress and pillow cover, air purifier, and environmental controls. \par \par Problem 4: GERD\par - continue Omeprazole 40 mg BID (pre-breakfast/pre-dinner) \par -continue Pepcid 40mg QHS \par -Rule of 2s: avoid eating too much, eating too late, eating too spicy, eating two hours before bed\par -Things to avoid including overeating, spicy foods, tight clothing, eating within three hours of bed, this list is not all inclusive. \par -For treatment of reflux, possible options discussed including diet control, H2 blockers, PPIs, as well as coating motility agents discussed as treatment options. Timing of meals and proximity of last meal to sleep were discussed. If symptoms persist, a formal gastrointestinal evaluation is needed. \par \par Problem 5: sleep apnea,\par - side sleeping (positional change)\par -has refused treatment ; chinstrap\par -Sleep apnea is associated with adverse clinical consequences which an affect most organ systems. Cardiovascular disease risk includes arrhythmias, atrial fibrillation, hypertension, coronary artery disease, and stroke. Metabolic disorders include diabetes type 2, non-alcoholic fatty liver disease. Mood disorder especially depression; and cognitive decline especially in the elderly. Associations with chronic reflux/Zamudio’s esophagus some but not all inclusive. \par -Reasons to assess this include arousal consistent with hypopnea; respiratory events most prominent in REM sleep or supine position; therefore sleep staging and body position are important for accurate diagnosis and estimation of AHI. \par \par problem 6: lung cancer screening\par -recommended follow up chest CT yearly, s/p CT 7/18/2021\par -Recommend CTS evaluation with Dr. Domingo\par - Lung cancer screening is recommended for people between the ages of 55 and 80 with prior 30+ pack year smoking histories. There is irrefutable evidence for realization of lung cancer screening based on two large randomized control trials demonstrating relative reduction in lung cancer mortality for patients undergoing low-dose CT scanning. Risks and benefits reviewed with the patient \par \par Problem 7a: COVID-19 precautionary Immune Support Recommendations:\par -OTC Vitamin C 500mg BID \par -OTC Quercetin 250-500mg BID \par -OTC Zinc 75-100mg per day \par -OTC Melatonin 1 or 2mg a night \par -OTC Vitamin D 1-4000mg per day \par -OTC Tonic Water 8oz per day\par -Water 0.5-1 gallon per day\par \par problem 7: health maintenance \par -Colonic polyp - evaluation with Dr. Flores ([facility]) pending\par -recommended to go to pulmonary rehab\par -he is has Xanax 0.5 mg PRN for anxiety\par -received influenza vaccination 2019\par -recommended strep pneumonia vaccines: Prevnar-13 vaccine, followed by Pneumo vaccine 23 one year following\par -recommended early intervention for URIs\par -recommended regular osteoporosis evaluations\par -recommended early dermatological evaluations\par -recommended after the age of 50 to the age of 70, colonoscopy every 5 years \par  \par \par F/U in 4-6 months\par He is encouraged to call with any questions, concerns, and changes. \par

## 2022-02-08 PROBLEM — Z00.00 ENCOUNTER FOR PREVENTIVE HEALTH EXAMINATION: Status: ACTIVE | Noted: 2022-02-08

## 2022-02-18 ENCOUNTER — TRANSCRIPTION ENCOUNTER (OUTPATIENT)
Age: 67
End: 2022-02-18

## 2022-02-22 ENCOUNTER — TRANSCRIPTION ENCOUNTER (OUTPATIENT)
Age: 67
End: 2022-02-22

## 2022-03-09 NOTE — ED PROCEDURE NOTE - CPROC ED FINDINGS1
positive return of urine in the collection bag [Follow-Up] : a follow-up visit [FreeTextEntry1] : CKD

## 2022-04-11 PROBLEM — Z11.59 SCREENING FOR VIRAL DISEASE: Status: ACTIVE | Noted: 2020-12-08

## 2022-06-09 ENCOUNTER — APPOINTMENT (OUTPATIENT)
Dept: PULMONOLOGY | Facility: CLINIC | Age: 67
End: 2022-06-09
Payer: MEDICARE

## 2022-06-09 VITALS
DIASTOLIC BLOOD PRESSURE: 60 MMHG | TEMPERATURE: 97.7 F | OXYGEN SATURATION: 98 % | HEART RATE: 91 BPM | BODY MASS INDEX: 25.21 KG/M2 | WEIGHT: 207 LBS | RESPIRATION RATE: 16 BRPM | HEIGHT: 76 IN | SYSTOLIC BLOOD PRESSURE: 120 MMHG

## 2022-06-09 PROCEDURE — 94729 DIFFUSING CAPACITY: CPT

## 2022-06-09 PROCEDURE — 94010 BREATHING CAPACITY TEST: CPT

## 2022-06-09 PROCEDURE — 95012 NITRIC OXIDE EXP GAS DETER: CPT

## 2022-06-09 PROCEDURE — 99214 OFFICE O/P EST MOD 30 MIN: CPT | Mod: 25

## 2022-06-09 PROCEDURE — 94727 GAS DIL/WSHOT DETER LNG VOL: CPT

## 2022-06-09 NOTE — ADDENDUM
[FreeTextEntry1] : Documented by Omaira Sanchez acting as a scribe for Dr. Cliff Marvin on 06/09/2022 .\par \par All medical record entries made by the Scribe were at my, Dr. Cliff Marvin's, direction and personally dictated by me on. I have reviewed the chart and agree that the record accurately reflects my personal performance of the history, physical exam, assessment and plan. I have also personally directed, reviewed, and agree with the discharge instructions.

## 2022-06-09 NOTE — ASSESSMENT
[FreeTextEntry1] : Mr. Caro is a 66 year old Male who has a history of BPH, hypothyroid, COPD, multiple lung cancers, OSAS, allergy, and GERD. He presents via video call. He is doing well from a pulmonary perspective. His number one complaint is Abnormal CTL IMELDA 1 cm nodule - suture line: DDx CA/ awaiting CRS evaluation c/w colonic polyp- stable- exception [facility] /energy \par \par \par His shortness of breath is multifactorial due to\par - history of COPD from prior smoking\par - restrictive disfunction due to multiple lung surgeries \par - obesity/out of shape \par - vocal cords\par - poor mechanics of breathing\par \par Problem 1: COPD\par -nebulizer with albuterol up to QID (0.83) \par - continue Symbicort 160 2 inhalations BID\par - continue Incruse 1 inhalation QD (after Symbicort )\par - follow up with pulmonary rehab\par \par -COPD is a progressive disease and although it can’t be cured , appropriate management can slow its progression, reduce frequency and severity of exacerbations, and improve symptoms and the patient quality of life. Hospitalizations are the greatest contributor to the total COPD costs and account for up to 87% of total COPD related costs. Exacerbations are the main cause of admissions and subsequently account for the 40-75% of COPD costs. Inhaled maintenance therapy reduces the incidence of exacerbations in patients with stable COPD. Incorrect inhaler use and nonadherence are major obstacles to achieving COPD treatment goals. Many COPD patients have challenges (impaired inhalation, limited dexterity, reduced cognition: that limit their ability to correctly use their COPD treatment devices resulting in reduced symptom control. Of most importance is smoking cessation and early intervention with respiratory illnesses and contemplation for pulmonary rehab to enhance quality of life. \par \par Problem 1A: Cardiac \par -Dubowsky \par -Coronary CT pending \par \par Problem 2: Obesity (improved)\par -Weight loss, exercise, and diet control were discussed and are highly encouraged. Treatment options were given such as, aqua therapy, and contacting a nutritionist. Recommended to use the elliptical, stationary bike, less use of treadmill. Obesity is associated with worsening asthma, shortness of breath, and potential for cardiac disease, diabetes, and other underlying medical conditions. \par \par Problem 3: allergies/sinuses\par - continue Astelin 0.15% 1 sniff/nostril BID\par - continue Flonase 1 spray each nostril BID\par - Off Xyzal 5 mg QHS due to prostate issues. \par -Environmental measures for allergies were encouraged including mattress and pillow cover, air purifier, and environmental controls. \par \par Problem 4: GERD\par - continue Omeprazole 40 mg BID (pre-breakfast/pre-dinner) \par -continue Pepcid 40mg QHS \par -Rule of 2s: avoid eating too much, eating too late, eating too spicy, eating two hours before bed\par -Things to avoid including overeating, spicy foods, tight clothing, eating within three hours of bed, this list is not all inclusive. \par -For treatment of reflux, possible options discussed including diet control, H2 blockers, PPIs, as well as coating motility agents discussed as treatment options. Timing of meals and proximity of last meal to sleep were discussed. If symptoms persist, a formal gastrointestinal evaluation is needed. \par \par Problem 5: sleep apnea,\par - side sleeping (positional change)\par -has refused treatment ; chinstrap\par -Sleep apnea is associated with adverse clinical consequences which an affect most organ systems. Cardiovascular disease risk includes arrhythmias, atrial fibrillation, hypertension, coronary artery disease, and stroke. Metabolic disorders include diabetes type 2, non-alcoholic fatty liver disease. Mood disorder especially depression; and cognitive decline especially in the elderly. Associations with chronic reflux/Zamudio’s esophagus some but not all inclusive. \par -Reasons to assess this include arousal consistent with hypopnea; respiratory events most prominent in REM sleep or supine position; therefore sleep staging and body position are important for accurate diagnosis and estimation of AHI. \par \par problem 6: lung cancer screening\par -recommended follow up chest CT yearly, s/p CT 7/18/2021 - next 7/2022 \par -Recommend CTS evaluation with Dr. Domingo\par - Lung cancer screening is recommended for people between the ages of 55 and 80 with prior 30+ pack year smoking histories. There is irrefutable evidence for realization of lung cancer screening based on two large randomized control trials demonstrating relative reduction in lung cancer mortality for patients undergoing low-dose CT scanning. Risks and benefits reviewed with the patient \par \par Problem 7a: COVID-19 precautionary Immune Support Recommendations:\par -Covid-19 vaccine x3 \par -OTC Vitamin C 500mg BID \par -OTC Quercetin 250-500mg BID \par -OTC Zinc 75-100mg per day \par -OTC Melatonin 1 or 2mg a night \par -OTC Vitamin D 1-4000mg per day \par -OTC Tonic Water 8oz per day\par -Water 0.5-1 gallon per day\par \par problem 7: health maintenance \par -Colonic polyp - evaluation with Dr. Flores ([facility]) pending\par -recommended to go to pulmonary rehab\par -he is has Xanax 0.5 mg PRN for anxiety\par -received influenza vaccination 2019\par -recommended strep pneumonia vaccines: Prevnar-13 vaccine, followed by Pneumo vaccine 23 one year following\par -recommended early intervention for URIs\par -recommended regular osteoporosis evaluations\par -recommended early dermatological evaluations\par -recommended after the age of 50 to the age of 70, colonoscopy every 5 years \par  \par \par F/U in 4-6 months\par He is encouraged to call with any questions, concerns, and changes. \par

## 2022-06-09 NOTE — PROCEDURE
[FreeTextEntry1] : Full PFT revealed moderate restrictive and severe obstructive dysfunction,  normal flows, with a FEV1 of 1.68L,which is 39% of predicted,  moderate decreased lung volumes, and a diffusion of 4.45, which is 123% of predicted with a normal flow volume loop \par \par FENO was 9      ; a normal value being less than 25\par Fractional exhaled nitric oxide (FENO) is regarded as a simple, noninvasive method for assessing eosinophilic airway inflammation. Produced by a variety of cells within the lung, nitric oxide (NO) concentrations are generally low in healthy individuals. However, high concentrations of NO appear to be involved in nonspecific host defense mechanisms and chronic inflammatory diseases such as asthma. The American Thoracic Society (ATS) therefore has recommended using FENO to aid in the diagnosis and monitoring of eosinophilic airway inflammation and asthma, and for identifying steroid responsive individuals whose chronic respiratory symptoms may be caused by airway inflammation.

## 2022-06-09 NOTE — HISTORY OF PRESENT ILLNESS
[FreeTextEntry1] : Mr. Caro is a 66 year old male presenting to the office via video call for a follow up visit for abnormal PFTs, allergic rhinitis, adenocarcinoma of the lung, COPD, GERD, LILY and shortness of breath. He is preop for liver / gb resection. His chief complaint is awaiting surgery\par -he notes being sick a month ago \par -he notes going to the doctor regarding his sickness\par -he was told he had bronchitis \par -he notes taking steroids and antibiotics and felt better \par -he notes having had colonoscopy \par -he notes seeing [facility] next week \par -he notes sleeping well\par -he notes using his nebulizer \par -he notes less occurrence of nocturia \par -he was hoarse when he was sick \par patient denies any headaches, nausea, vomiting, fever, chills, sweats, chest pain, chest pressure, palpitations, coughing, wheezing, fatigue, diarrhea, constipation, dysphagia, myalgias, dizziness, leg swelling, leg pain, itchy eyes, itchy ears, heartburn, reflux or sour taste in the mouth

## 2022-06-13 ENCOUNTER — APPOINTMENT (OUTPATIENT)
Dept: CT IMAGING | Facility: CLINIC | Age: 67
End: 2022-06-13

## 2022-06-21 ENCOUNTER — APPOINTMENT (OUTPATIENT)
Dept: NUCLEAR MEDICINE | Facility: CLINIC | Age: 67
End: 2022-06-21
Payer: MEDICARE

## 2022-06-21 ENCOUNTER — OUTPATIENT (OUTPATIENT)
Dept: OUTPATIENT SERVICES | Facility: HOSPITAL | Age: 67
LOS: 1 days | End: 2022-06-21

## 2022-06-21 DIAGNOSIS — Z98.890 OTHER SPECIFIED POSTPROCEDURAL STATES: Chronic | ICD-10-CM

## 2022-06-21 DIAGNOSIS — C34.92 MALIGNANT NEOPLASM OF UNSPECIFIED PART OF LEFT BRONCHUS OR LUNG: ICD-10-CM

## 2022-06-21 DIAGNOSIS — C34.92 MALIGNANT NEOPLASM OF UNSPECIFIED PART OF LEFT BRONCHUS OR LUNG: Chronic | ICD-10-CM

## 2022-06-21 DIAGNOSIS — E89.0 POSTPROCEDURAL HYPOTHYROIDISM: Chronic | ICD-10-CM

## 2022-06-21 PROCEDURE — 78815 PET IMAGE W/CT SKULL-THIGH: CPT | Mod: 26,PS,MH

## 2022-06-29 ENCOUNTER — TRANSCRIPTION ENCOUNTER (OUTPATIENT)
Age: 67
End: 2022-06-29

## 2022-07-11 ENCOUNTER — NON-APPOINTMENT (OUTPATIENT)
Age: 67
End: 2022-07-11

## 2022-07-11 ENCOUNTER — APPOINTMENT (OUTPATIENT)
Dept: PULMONOLOGY | Facility: CLINIC | Age: 67
End: 2022-07-11

## 2022-07-11 VITALS — WEIGHT: 208 LBS | HEIGHT: 76 IN | BODY MASS INDEX: 25.33 KG/M2

## 2022-07-11 PROCEDURE — 99213 OFFICE O/P EST LOW 20 MIN: CPT | Mod: CS,95

## 2022-07-11 RX ORDER — NIRMATRELVIR AND RITONAVIR 300-100 MG
20 X 150 MG & KIT ORAL
Qty: 1 | Refills: 0 | Status: ACTIVE | COMMUNITY
Start: 2022-07-11 | End: 1900-01-01

## 2022-07-12 NOTE — REVIEW OF SYSTEMS
[Fatigue] : fatigue [Chills] : chills [Nasal Congestion] : nasal congestion [Cough] : cough [Sputum] : sputum [Seasonal Allergies] : seasonal allergies [GERD] : gerd [Negative] : Psychiatric [Fever] : no fever [Recent Wt Gain (___ Lbs)] : ~T no recent weight gain [EDS] : no eds [Poor Appetite] : no poor appetite [Recent Wt Loss (___ Lbs)] : ~T no recent weight loss [Dry Eyes] : no dry eyes [Ear Disturbance] : no ear disturbance [Epistaxis] : no epistaxis [Sore Throat] : no sore throat [Eye Irritation] : no eye irritation [Postnasal Drip] : no postnasal drip [Dry Mouth] : no dry mouth [Sinus Problems] : no sinus problems [Mouth Ulcers] : no mouth ulcers [Poor Dentition] : no poor dentition [Edentulous] : no edentulous [Hemoptysis] : no hemoptysis [Chest Tightness] : no chest tightness [Frequent URIs] : no frequent URIs [Dyspnea] : no dyspnea [Pleuritic Pain] : no pleuritic pain [Wheezing] : no wheezing [A.M. Dry Mouth] : no a.m. dry mouth [SOB on Exertion] : no sob on exertion [Hay Fever] : no hay fever [Watery Eyes] : no watery eyes [Itchy Eyes] : no itchy eyes [Nasal Discharge] : no nasal discharge [Hives] : no hives [Angioedema] : no angioedema [Immunocompromised] : not immunocompromised [Abdominal Pain] : no abdominal pain [Nausea] : no nausea [Vomiting] : no vomiting [Diarrhea] : no diarrhea [Constipation] : no constipation [Dysphagia] : no dysphagia [Bleeding] : no bleeding [Food Intolerance] : no food intolerance [Hepatic Disease] : no hepatic disease

## 2022-07-12 NOTE — ASSESSMENT
[FreeTextEntry1] : Mr. Caro is a 66 year old male allergic rhinitis, adenocarcinoma of the lung, COPD, GERD, LILY and shortness of breath presents via video for COVID infection. \par \par His chief complaint is COVID infection. \par \par 1. COVID infection\par - Add Paxlovid BID X 5 days\par -Advised Tayla seltzer Cold & flu\par -Advised Throat Coat tea\par \par 2. COPD\par - continue albuterol via nebulizer up to QID \par - continue Symbicort 160 2 inhalations BID\par \par 3. Allergic Rhinitis\par - continue Xyzal\par -continue Claritin\par \par Patient to follow up with Dr. Marvin as scheduled.\par Patient to call with further questions and concerns.\par Patient verbalizes understanding of care plan and is agreeable.\par \par

## 2022-07-12 NOTE — PHYSICAL EXAM
[No Acute Distress] : no acute distress [Normal Color/ Pigmentation] : normal color/ pigmentation [No Focal Deficits] : no focal deficits [Oriented x3] : oriented x3 [Normal Affect] : normal affect

## 2022-07-12 NOTE — REASON FOR VISIT
[Home] : at home, [unfilled] , at the time of the visit. [Medical Office: ([facility])___] : at the medical office located in  [Patient] : the patient [Follow-Up] : a follow-up visit [Cough] : cough [COPD] : COPD

## 2022-07-12 NOTE — HISTORY OF PRESENT ILLNESS
[Former] : former [Never] : never [TextBox_4] : Mr. Caro is a 66 year old male allergic rhinitis, adenocarcinoma of the lung, COPD, GERD, LILY and shortness of breath presents via video for COVID infection. \par \par His chief complaint is COVID infection. \par \par Patient reports he tested positive for COVID 19 today via home test. He reports his symptoms started yesterday. Patient's symptoms are itchy throat, body aches, chills, nasal congestion, runny nose, fatigue, and dry cough. He reports he is currently on Symbicort, albuterol via nebulizer TID, Claritin, and Xyzal. Patient denies taking statins or blood thinners. \par \par Patient denies SOB, fever, wheezing.\par \par

## 2022-08-03 ENCOUNTER — TRANSCRIPTION ENCOUNTER (OUTPATIENT)
Age: 67
End: 2022-08-03

## 2022-08-04 ENCOUNTER — TRANSCRIPTION ENCOUNTER (OUTPATIENT)
Age: 67
End: 2022-08-04

## 2022-08-31 ENCOUNTER — NON-APPOINTMENT (OUTPATIENT)
Age: 67
End: 2022-08-31

## 2022-10-05 ENCOUNTER — TRANSCRIPTION ENCOUNTER (OUTPATIENT)
Age: 67
End: 2022-10-05

## 2022-10-13 ENCOUNTER — RX RENEWAL (OUTPATIENT)
Age: 67
End: 2022-10-13

## 2022-11-09 ENCOUNTER — APPOINTMENT (OUTPATIENT)
Dept: PULMONOLOGY | Facility: CLINIC | Age: 67
End: 2022-11-09

## 2022-11-09 VITALS
WEIGHT: 213 LBS | BODY MASS INDEX: 25.94 KG/M2 | DIASTOLIC BLOOD PRESSURE: 70 MMHG | OXYGEN SATURATION: 98 % | TEMPERATURE: 97.3 F | RESPIRATION RATE: 16 BRPM | SYSTOLIC BLOOD PRESSURE: 128 MMHG | HEIGHT: 76 IN | HEART RATE: 95 BPM

## 2022-11-09 DIAGNOSIS — Z71.89 OTHER SPECIFIED COUNSELING: ICD-10-CM

## 2022-11-09 PROCEDURE — 99214 OFFICE O/P EST MOD 30 MIN: CPT | Mod: 25

## 2022-11-09 PROCEDURE — 94010 BREATHING CAPACITY TEST: CPT

## 2022-11-09 PROCEDURE — 94729 DIFFUSING CAPACITY: CPT

## 2022-11-09 PROCEDURE — 95012 NITRIC OXIDE EXP GAS DETER: CPT

## 2022-11-09 PROCEDURE — 94727 GAS DIL/WSHOT DETER LNG VOL: CPT

## 2022-11-09 RX ORDER — ROSUVASTATIN CALCIUM 5 MG/1
5 TABLET, FILM COATED ORAL
Qty: 90 | Refills: 0 | Status: ACTIVE | COMMUNITY
Start: 2022-09-28

## 2022-11-09 RX ORDER — BENZONATATE 100 MG/1
100 CAPSULE ORAL
Qty: 15 | Refills: 0 | Status: ACTIVE | COMMUNITY
Start: 2022-05-24

## 2022-11-09 RX ORDER — METHYLPREDNISOLONE 4 MG/1
4 TABLET ORAL
Qty: 21 | Refills: 0 | Status: DISCONTINUED | COMMUNITY
Start: 2022-05-24

## 2022-11-09 RX ORDER — LEVOTHYROXINE SODIUM 0.2 MG/1
200 TABLET ORAL
Qty: 90 | Refills: 0 | Status: ACTIVE | COMMUNITY
Start: 2022-10-05

## 2022-11-09 RX ORDER — TIOTROPIUM BROMIDE 18 UG/1
18 CAPSULE ORAL; RESPIRATORY (INHALATION)
Qty: 3 | Refills: 1 | Status: ACTIVE | COMMUNITY
Start: 2022-11-09 | End: 1900-01-01

## 2022-11-09 NOTE — REASON FOR VISIT
[Follow-Up] : a follow-up visit [Spouse] : spouse [FreeTextEntry1] : abnormal PFTs, allergic rhinitis, adenocarcinoma of the lung, COPD, GERD, LILY and shortness of breath

## 2022-11-09 NOTE — ASSESSMENT
[FreeTextEntry1] : Mr. Caro is a 67 year old Male who has a history of BPH, hypothyroid, COPD, multiple lung cancers, OSAS, allergy, and GERD. He presents to the office today. He is doing well from a pulmonary perspective. His number one complaint is Abnormal CTL IMELDA 1 cm nodule - suture line: DDx CA/ awaiting CRS evaluation c/w colonic polyp- stable- exception [facility] - ?RT pneumonitis\par \par \par His shortness of breath is multifactorial due to\par - history of COPD from prior smoking\par - restrictive disfunction due to multiple lung surgeries \par - obesity/out of shape \par - vocal cords\par - poor mechanics of breathing\par \par Problem 1: COPD\par -nebulizer with albuterol up to QID (0.83) \par - continue Symbicort 160 2 inhalations BID\par - continue Incruse 1 inhalation QD (after Symbicort )\par - follow up with pulmonary rehab\par \par -COPD is a progressive disease and although it can’t be cured , appropriate management can slow its progression, reduce frequency and severity of exacerbations, and improve symptoms and the patient quality of life. Hospitalizations are the greatest contributor to the total COPD costs and account for up to 87% of total COPD related costs. Exacerbations are the main cause of admissions and subsequently account for the 40-75% of COPD costs. Inhaled maintenance therapy reduces the incidence of exacerbations in patients with stable COPD. Incorrect inhaler use and nonadherence are major obstacles to achieving COPD treatment goals. Many COPD patients have challenges (impaired inhalation, limited dexterity, reduced cognition: that limit their ability to correctly use their COPD treatment devices resulting in reduced symptom control. Of most importance is smoking cessation and early intervention with respiratory illnesses and contemplation for pulmonary rehab to enhance quality of life. \par \par Problem 1A: ?RT pneumonitis\par -CT schedule\par   -if (+), consider prednisone Rx\par \par Problem 1A: Cardiac \par -Dubowsky  pending\par -Coronary CT yearly\par \par Problem 2: Obesity (improved)\par -Weight loss, exercise, and diet control were discussed and are highly encouraged. Treatment options were given such as, aqua therapy, and contacting a nutritionist. Recommended to use the elliptical, stationary bike, less use of treadmill. Obesity is associated with worsening asthma, shortness of breath, and potential for cardiac disease, diabetes, and other underlying medical conditions. \par \par Problem 3: allergies/sinuses\par - continue Astelin 0.15% 1 sniff/nostril BID\par - continue Flonase 1 spray each nostril BID\par - Off Xyzal 5 mg QHS due to prostate issues. \par -Environmental measures for allergies were encouraged including mattress and pillow cover, air purifier, and environmental controls. \par \par Problem 4: GERD\par - continue Omeprazole 40 mg BID (pre-breakfast/pre-dinner) \par -continue Pepcid 40mg QHS \par -Rule of 2s: avoid eating too much, eating too late, eating too spicy, eating two hours before bed\par -Things to avoid including overeating, spicy foods, tight clothing, eating within three hours of bed, this list is not all inclusive. \par -For treatment of reflux, possible options discussed including diet control, H2 blockers, PPIs, as well as coating motility agents discussed as treatment options. Timing of meals and proximity of last meal to sleep were discussed. If symptoms persist, a formal gastrointestinal evaluation is needed. \par \par Problem 5: sleep apnea,\par - side sleeping (positional change)\par -has refused treatment ; chinstrap\par -Sleep apnea is associated with adverse clinical consequences which an affect most organ systems. Cardiovascular disease risk includes arrhythmias, atrial fibrillation, hypertension, coronary artery disease, and stroke. Metabolic disorders include diabetes type 2, non-alcoholic fatty liver disease. Mood disorder especially depression; and cognitive decline especially in the elderly. Associations with chronic reflux/Zamudio’s esophagus some but not all inclusive. \par -Reasons to assess this include arousal consistent with hypopnea; respiratory events most prominent in REM sleep or supine position; therefore sleep staging and body position are important for accurate diagnosis and estimation of AHI. \par \par problem 6: lung cancer screening\par -recommended follow up chest CT yearly, s/p CT 7/18/2021 - next 7/2022 \par -Recommend CTS evaluation with Dr. Domingo\par - Lung cancer screening is recommended for people between the ages of 55 and 80 with prior 30+ pack year smoking histories. There is irrefutable evidence for realization of lung cancer screening based on two large randomized control trials demonstrating relative reduction in lung cancer mortality for patients undergoing low-dose CT scanning. Risks and benefits reviewed with the patient \par \par Problem 7a: COVID-19 precautionary Immune Support Recommendations:\par -Covid-19 vaccine x3 \par -OTC Vitamin C 500mg BID \par -OTC Quercetin 250-500mg BID \par -OTC Zinc 75-100mg per day \par -OTC Melatonin 1 or 2mg a night \par -OTC Vitamin D 1-4000mg per day \par -OTC Tonic Water 8oz per day\par -Water 0.5-1 gallon per day\par \par problem 7: health maintenance \par -recommended to go to pulmonary rehab\par -he is has Xanax 0.5 mg PRN for anxiety\par -received influenza vaccination 2019\par -recommended strep pneumonia vaccines: Prevnar-13 vaccine, followed by Pneumo vaccine 23 one year following\par -recommended early intervention for URIs\par -recommended regular osteoporosis evaluations\par -recommended early dermatological evaluations\par -recommended after the age of 50 to the age of 70, colonoscopy every 5 years \par  \par \par F/U in 4-6 months\par He is encouraged to call with any questions, concerns, and changes. \par

## 2022-11-09 NOTE — PROCEDURE
[FreeTextEntry1] : PET CT (6/21/2022) revealed increased size in nodularity in IMELDA measure 1.8cm with SUV 7.2.\par \par Full PFT reveals mild restrictive and severe obstructive dysfunction; FEV1 was 1.57L which is 36% of predicted; moderately reduced lung volumes; moderately reduced diffusion at 14.3, which is 56% of predicted; normal flow volume loop. \par \par FENO was 16; a normal value being less than 25\par Fractional exhaled nitric oxide (FENO) is regarded as a simple, noninvasive method for assessing eosinophilic airway inflammation. Produced by a variety of cells within the lung, nitric oxide (NO) concentrations are generally low in healthy individuals. However, high concentrations of NO appear to be involved in nonspecific host defense mechanisms and chronic inflammatory diseases such as asthma. The American Thoracic Society (ATS) therefore has recommended using FENO to aid in the diagnosis and monitoring of eosinophilic airway inflammation and asthma, and for identifying steroid responsive individuals whose chronic respiratory symptoms may be caused by airway inflammation.

## 2022-11-09 NOTE — HISTORY OF PRESENT ILLNESS
[FreeTextEntry1] : Mr. Caro is a 67 year old male presenting to the office for a follow up visit for abnormal PFTs, allergic rhinitis, adenocarcinoma of the lung, COPD, GERD, LILY and shortness of breath. His chief complaint is\par \par -he notes increased dyspnea\par -he notes recent chest pain on sides of chest b/l\par -he notes bowels are regular \par -he notes his senses of smell and taste are stable \par -he notes vision is stable \par -he notes sleeping for  7-8hrs\par -he notes nocturia x1\par -he notes intermittent cough (daily coughing fit for 5 min)\par -he notes PND\par -he notes weight is stable\par -he notes taking Vit B12 and Vit D\par -he notes compliant with nebulizer\par \par -he denies any headaches, nausea, vomiting, fever, chills, sweats, chest pressure, wheezing, palpitations, constipation, diarrhea, dizziness, dysphagia, heartburn, reflux, itchy eyes, itchy ears, leg swelling, leg pain, arthralgias, myalgias, or sour taste in the mouth.

## 2022-11-09 NOTE — ADDENDUM
[FreeTextEntry1] : Documented by MORE Welch acting as a scribe for Dr. Cliff Marvin on 11/09/2022 .\par All medical record entries made by the Scribe were at my, Dr. Cliff Marvin's, direction and personally dictated by me on 11/09/2022. I have reviewed the chart and agree that the record accurately reflects my personal performance of the history, physical exam, assessment and plan. I have also personally directed, reviewed, and agree with the discharge instructions.

## 2022-11-09 NOTE — PHYSICAL EXAM
[No Acute Distress] : no acute distress [II] : Mallampati Class: II [Normal Oropharynx] : normal oropharynx [Normal Appearance] : normal appearance [No Neck Mass] : no neck mass [Normal Rate/Rhythm] : normal rate/rhythm [Normal S1, S2] : normal s1, s2 [No Murmurs] : no murmurs [No Resp Distress] : no resp distress [Clear to Auscultation Bilaterally] : clear to auscultation bilaterally [No Abnormalities] : no abnormalities [Benign] : benign [Normal Gait] : normal gait [No Clubbing] : no clubbing [No Cyanosis] : no cyanosis [No Edema] : no edema [FROM] : FROM [Normal Color/ Pigmentation] : normal color/ pigmentation [No Focal Deficits] : no focal deficits [Oriented x3] : oriented x3 [Normal Affect] : normal affect [TextBox_68] : I:E ratio 1:3; clear

## 2022-11-14 ENCOUNTER — TRANSCRIPTION ENCOUNTER (OUTPATIENT)
Age: 67
End: 2022-11-14

## 2022-11-14 ENCOUNTER — APPOINTMENT (OUTPATIENT)
Dept: CT IMAGING | Facility: CLINIC | Age: 67
End: 2022-11-14

## 2022-11-14 PROCEDURE — 71260 CT THORAX DX C+: CPT | Mod: MH

## 2022-12-13 ENCOUNTER — APPOINTMENT (OUTPATIENT)
Dept: DERMATOLOGY | Facility: CLINIC | Age: 67
End: 2022-12-13

## 2022-12-13 PROCEDURE — 99213 OFFICE O/P EST LOW 20 MIN: CPT

## 2022-12-14 NOTE — H&P PST ADULT - HEIGHT IN CM
Consent: Written consent was obtained and risks were reviewed including but not limited to scarring, infection, bleeding, scabbing, incomplete removal, nerve damage and allergy to anesthesia. 193.04

## 2022-12-30 NOTE — PROCEDURE
Neurology Service Consult Note  [address]   Patient Name: Mallie Nyhan  : 1954        Subjective:   Reason for consult: Progressive weakness of the lower extremities  77 y.o. female with history of anemia, arthritis, CAD, stroke 2020, CHF, chronic back/neck pain s/p posterior C3-T2 posterior fusion, ESRD on dialysis, COPD, depression, DM, emphysema, glaucoma, HTL, HTN, RA, YRIS, urinary incontinence presenting to [place] [address]  with acute on chronic weakness in the bilateral legs, arms and difficulty swallowing and drinking. Symptoms  have been present for over a year. She has had significant worsening of symptoms over the last several weeks and is no longer able to stand. She is choking when trying to drink and is having difficulty speaking. She has had increase in falls. Patient, who is on dialysis missed last dialysis on Monday. She was noted to have an acute DVT in the right upper extremity as well as superficial venous thrombosis and clotted fistula. She was started on Eliquis. Barium swallow was completed  with recommendations for regular diet at the patients request with pills crushed. Plan is for dialysis today. Patient is currently being treated for UTI and pneumonia in addition to DVT. Chart was reviewed in detail, patient was seen and assessed. She is laying in bed alert and oriented x4. Significant hypophonia noted during exam.  And patient appears to have increased work of breathing with frequent short respirations. She has profound weakness in her upper and lower extremities. She reports that this has been a progressive decline over the last several months. Son at bedside shares that in August or September of this year patient was able to grocery shop independently and ambulate without difficulty. Legs progressively got weaker limiting her ability to ambulate.   They then started to notice that she was having weakness of the upper extremities and is currently so weak she is unable to feed herself. Patient reports 11 falls in the recent past and has hit her head several times with these falls. She does complain of neck pain but denies any radiating pain into her upper extremities. She denies double vision but does report a \"moving black spot\" behind her eye. Patient also reports that she has difficulty with coughing, sneezing or blowing her nose raising concern for diaphragmatic weakness.       Past Medical History:   Diagnosis Date    Acid reflux     Anemia     Arthritis     \"Shoulders, Hips And Knees\"    CAD (coronary artery disease)     Sees Dr. Traci Lee    Cerebral artery occlusion with cerebral infarction ([facility]     CHF (congestive heart failure) ([facility])     Chronic back pain     Chronic constipation     Chronic kidney disease     Sees Dr. Renella Crigler    COPD (chronic obstructive pulmonary disease) [facility]     Sees Dr. Rita Key    Depression     Diabetes mellitus [facility]) Dx     Sees Dr. Felicita Adrian    Emphysema lung [facility])     Glaucoma     \"Both Eyes\"    Gout     Hemodialysis patient ([facility]     Hiatal hernia     History of blood transfusion 's    No Reaction To Blood Transfusion Received    [place] (hard of hearing)     Bilateral Ears    Hyperlipidemia     Hypertension     Dr. Victoria Erickson    Itching     \"Whole Body Itches The [de-identified] Of The Time\"    Morbid obesity ([facility]     Rheumatoid arthritis ([facility]     Shortness of breath on exertion     Sleep apnea     Uses CPAP    Teeth missing     Upper And Lower    Thyroid disease     Thyroidectomy In     Urinary incontinence     WD-Chronic buttock pain 2018    Wears glasses     :   Past Surgical History:   Procedure Laterality Date    APPENDECTOMY      AV FISTULA CREATION Left     BACK SURGERY  2017    Lower Back With Hardware    BACK SURGERY  2016    Cervical Back With Hardware    CARPAL TUNNEL RELEASE Right      SECTION  3-30-[birth year] And 10-17-69    COLONOSCOPY  2017 Polyps Removed    CORONARY ANGIOPLASTY  04/24/2016    Heart Stent D Circ    DENTAL SURGERY      Teeth Extracted In Past    DILATION AND CURETTAGE OF UTERUS  Last Done In [address] [place] [address] 281    \"Numerous\"    ENDOSCOPY, COLON, DIAGNOSTIC  2017    HYSTERECTOMY (CERVIX STATUS UNKNOWN)  1990    Partial Abdominal Hysterectomy    IR NONTUNNELED VASCULAR CATHETER  12/29/2022    IR NONTUNNELED VASCULAR CATHETER 12/29/2022 [facility] SPECIAL PROCEDURES    IR TUNNELED CATHETER PLACEMENT GREATER THAN 5 YEARS  7/18/2019    IR TUNNELED CATHETER PLACEMENT GREATER THAN 5 YEARS 7/18/2019 [facility] SPECIAL PROCEDURES    IR TUNNELED CATHETER PLACEMENT GREATER THAN 5 YEARS  3/3/2021    IR TUNNELED CATHETER PLACEMENT GREATER THAN 5 YEARS [facility] SPECIAL PROCEDURES    ROTATOR CUFF REPAIR Left 06/2010    SLEEVE GASTRECTOMY  04/16/2018    robotic assisted gastric sleeve, hiatal hernia repair    THYROIDECTOMY  1993     Medications:  Scheduled Meds:   mirtazapine  15 mg Oral Nightly    sucralfate  1 g Oral 2 times per day    betamethasone dipropionate   Topical Daily    apixaban  5 mg Oral BID    atorvastatin  20 mg Oral Nightly    metoprolol tartrate  25 mg Oral BID    midodrine  5 mg Oral TID WC    cloNIDine  0.1 mg Oral BID    amLODIPine  5 mg Oral Daily    aspirin  81 mg Oral Daily    levothyroxine  100 mcg Oral Daily    montelukast  10 mg Oral Nightly    pantoprazole  40 mg Oral BID AC    predniSONE  20 mg Oral Daily    QUEtiapine  50 mg Oral Nightly    ranolazine  500 mg Oral BID    sevelamer  800 mg Oral TID WC    tiotropium-olodaterol  2 puff Inhalation Daily    sodium chloride flush  5-40 mL IntraVENous 2 times per day    cefTRIAXone (ROCEPHIN) IV  1,000 mg IntraVENous Q24H    azithromycin  500 mg IntraVENous Q24H    allopurinol  200 mg Oral Daily    levomilnacipran  40 mg Oral Nightly    LORazepam  0.5 mg Oral BID     Continuous Infusions:   sodium chloride       PRN Meds:.ipratropium-albuterol, sodium chloride flush, sodium chloride, ondansetron **OR** ondansetron, polyethylene glycol, traMADol    Allergies   Allergen Reactions    Percocet [Oxycodone-Acetaminophen]      hallucinations    Tramadol Itching    Vicodin [Hydrocodone-Acetaminophen] Itching    Narcan [Naloxone Hcl] Anxiety     Feels like out of body, things are speeding     Social History     Socioeconomic History    Marital status: Single     Spouse name: Not on file    Number of children: 2    Years of education: Not on file    Highest education level: Not on file   Occupational History    Not on file   Tobacco Use    Smoking status: Never    Smokeless tobacco: Never   Vaping Use    Vaping Use: Never used   Substance and Sexual Activity    Alcohol use: No    Drug use: No    Sexual activity: Never   Other Topics Concern    Not on file   Social History Narrative    Not on file     Social Determinants of Health     Financial Resource Strain: Not on file   Food Insecurity: Not on file   Transportation Needs: Not on file   Physical Activity: Not on file   Stress: Not on file   Social Connections: Not on file   Intimate Partner Violence: Not on file   Housing Stability: Not on file      Family History   Problem Relation Age of Onset    Kidney Disease Mother         \"Kidney Failure\"    Heart Disease Mother         CHF    Arthritis Mother     Diabetes Mother     Depression Mother     High Blood Pressure Mother     Obesity Mother     Vision Loss Mother     Heart Attack Father     Heart Disease Father         Heart Attack    Diabetes Father     High Blood Pressure Father     High Blood Pressure Daughter          ROS (10 systems)  In addition to that documented in the HPI above, the additional ROS was obtained:  Constitutional: Denies fevers or chills  Eyes: Sensation of black spot behind her eye  ENMT: Denies sore throat  CV: Denies chest pain  Resp: Feels short of breath, difficulty taking deep respirations  GI: Denies vomiting or diarrhea  : Denies painful urination  MSK: Denies recent trauma  Skin: Denies new rashes  Neuro: Worsening weakness to the upper and lower extremities  Endocrine: Denies unexpected weight loss  Heme: Denies bleeding disorders    Physical Exam:      Wt Readings from Last 3 Encounters:   12/30/22 175 lb 1.6 oz (79.4 kg)   12/06/22 180 lb (81.6 kg)   08/24/22 200 lb (90.7 kg)     Temp Readings from Last 3 Encounters:   12/30/22 97.6 °F (36.4 °C)   12/06/22 97.7 °F (36.5 °C)   08/24/22 97.1 °F (36.2 °C)     BP Readings from Last 3 Encounters:   12/30/22 (!) 147/59   12/06/22 137/84   05/10/22 125/75     Pulse Readings from Last 3 Encounters:   12/30/22 98   12/06/22 97   08/24/22 91        Gen: A&O x 4, NAD, cooperative  HEENT: NC/AT, EOMI, PERRL, mmm, neck supple, no meningeal signs  Heart: NSR/ST  Lungs: Respirations shallow, increased WOB  Ext: no edema, no calf tenderness b/l  Psych: normal mood and affect  Skin: no rashes or lesions    NEUROLOGIC EXAM:    Mental Status: A&O to self, location, month and year, NAD, speech clear with weak voice and hypophonia, language fluent, repetition and naming intact, follows commands appropriately    Cranial Nerve Exam:   CN II-XII:  PERRL, VFF, no nystagmus, no gaze paresis, sensation V1-V3 intact b/l, muscles of facial expression symmetric; hearing intact to conversational tone, palate elevates symmetrically, shoulder elevation symmetric and tongue protrudes midline with movement side to side.     Motor Exam:       Strength unable to lift upper or lower extremities to antigravity, weak attempt to lift extremities off of bed  Tone and bulk normal   Bilateral extremity pronator drift    Deep Tendon Reflexes: Trace/4 biceps, triceps, brachioradialis, patellar, and achilles b/l; flexor plantar responses b/l    Sensation: Intact light touch/pinprick/vibration UE's/LE's b/l    Coordination/Cerebellum:       Tremors--none      Rapidly alternating movements:  dysdiadochokinesia b/l                Heel-to-Shin:  dysmetria b/l      Finger-to-Nose: dysmetria b/l    Gait and stance:      Gait: deferred      LABS:     Recent Labs     12/27/22  1835 12/28/22  0456 12/29/22  1804 12/30/22  0813   WBC 5.3  --  8.6 7.3   NA  --  137 137 136   K  --  4.8 5.3* 4.5   CL  --  100 98* 100   CO2  --  20* 24 23   BUN  --  76* 62* 67*   CREATININE  --  3.9* 3.7* 4.0*   GLUCOSE  --  83 120* 88       IMAGING:    CT head w/o contrast:  Impression   Stable appearance of the brain without acute intracranial process identified. CT cervical spine:  Impression   No acute fracture or traumatic malalignment of the cervical spine       Posterior fusion extends from C3-T2 with laminectomies at C4, C5, C6 and C7. The left pedicle at T2 extends into the left transverse process but does not   extend into the vertebral body. Tip of the left T2 pedicle screw is directly   adjacent to the posterior aortic arch. All imaging was personally reviewed    ASSESSMENT/PLAN:   75-year-old female presenting with worsening extremity weakness. Patient is alert and oriented x4. Speech is clear with weak, hypophonic voice. Language is fluent. Face is symmetric with masklike facies. No ptosis noted to eyes on exam, EOMs intact, pupils equal, round and reactive. Can lift extremities ever so slightly upward but cannot hold to antigravity. Reflexes significantly diminished in the upper and lower extremities with sensation intact. Patient is also reporting neck pain and bifrontal headache rated 5/10. Progressive extremity weakness secondary to myasthenia gravis v cervical stenosis. Neuroimaging as above, nonacute  Will order MRI cervical spine given progressive extremity weakness with history of extensive posterior cervical fusion. Patient is reporting worsening neck pain with cracking and popping noted with movement. She has had multiple falls striking her head.     Myasthenia gravis panel ordered  Will order Mestinon 30 mg p.o. every 8 hours for possible myasthenia gravis  Continue Eliquis, for DVT  Continue aspirin, Lipitor for secondary stroke prevention  Creatinine 4.0, ESRD on dialysis, management per nephrology  PT/OT as recommended, feel patient would benefit from passive range of motion exercises even if unable to participate in formal PT  Further recommendations pending imaging, MG panel and response to Mestinon    2. Bifrontal headache management  Will order Depacon 500 mg IV to be infused over 10 minutes x 2 doses. > 75 minutes of time spent included chart review, obtaining history, patient examination, developing plan of care, and documentation. Patient was discussed with attending neurologist Dr. Aurelio Moss. Thank you for allowing us to participate in the care of your patient. If there are any questions regarding evaluation please feel free to contact us. FIDEL Parada - CNP, 12/30/2022       ------------------------------------    Attending Note:  I have rounded on this patient with Nely Love CNP. I have reviewed the chart and we have discussed this case in detail. The patient was seen and examined by myself. Pertinent labs and imaging have been personally reviewed. Our findings and impressions were discussed with the patient. I concur with the Nurse Practioner's assessment and plan. MRI of the cervical spine is pending given history of cervical spinal surgery. Reassuringly, there are no upper motor neuron signs on examination. With the constellation of symptoms of diffuse proximal weakness, bulbar speech, dysphagia, and dyspnea myasthenia gravis is high on the differential diagnosis. Myasthenia gravis antibody panel is pending. CK was normal.  Discussed with internal medicine and it is okay to start pyridostigmine empirically. Unfortunately, we were informed by the pharmacy that we do not have intravenous pyridostigmine available. The patient is suffering from dysphagia and this would have been preferable.   The pyridostigmine tablets can be crushed I am told.  We will see how she does with taking pyridostigmine by mouth. We will start with 30 mg 3 times daily and titrate up to 60 mg 3 times daily if needed.     Rose Wade,  12/30/2022 9:43 PM [FreeTextEntry1] : 6 minute walk test reveals a low saturation of 95% with no evidence of dyspnea or fatigue; walked  489 meters. \par \par PFT revealed moderate restrictive and mild obstructive dysfunction, with a FEV1 of 1.65L, which is 41% of predicted, with a normal flow volume loop\par \par FENO was 15; a normal value being less than 25\par Fractional exhaled nitric oxide (FENO) is regarded as a simple, noninvasive method for assessing eosinophilic airway inflammation. Produced by a variety of cells within the lung, nitric oxide (NO) concentrations are generally low in healthy individuals. However, high concentrations of NO appear to be involved in nonspecific host defense mechanisms and chronic inflammatory diseases such as asthma. The American Thoracic Society (ATS) therefore has recommended using FENO to aid in the diagnosis and monitoring of eosinophilic airway inflammation and asthma, and for identifying steroid responsive individuals whose chronic respiratory symptoms may be caused by airway inflammation. \par \par Chest CT (1/22/2021) reveals status post bilateral upper lobectomy with stable postsurgical changes. Bibasilar pleural thickening are unchanged. No new or enlarging nodule.\par Tubular structure within the left lobe of the liver is new from 2019, suspicious for dilated bile duct. Recommended further evaluation with  MRI/MRCP to rule out underlying obstructive lesion.

## 2023-01-09 ENCOUNTER — APPOINTMENT (OUTPATIENT)
Dept: PULMONOLOGY | Facility: CLINIC | Age: 68
End: 2023-01-09
Payer: MEDICARE

## 2023-01-09 PROCEDURE — 99214 OFFICE O/P EST MOD 30 MIN: CPT | Mod: CS,95

## 2023-01-09 NOTE — REASON FOR VISIT
[Follow-Up] : a follow-up visit [Spouse] : spouse [FreeTextEntry1] : via video call -abnormal PFTs, allergic rhinitis, adenocarcinoma of the lung, COPD, GERD, LILY and shortness of breath

## 2023-01-09 NOTE — ADDENDUM
[FreeTextEntry1] : Documented by Pollo Maldonado acting as a scribe for Dr. Cliff Marvin on 01/09/2023 .\par \par All medical record entries made by the Scribe were at my, Dr. Cliff Marvin's, direction and personally dictated by me on 01/09/2023. I have reviewed the chart and agree that the record accurately reflects my personal performance of the history, physical exam, assessment and plan. I have also personally directed, reviewed, and agree with the discharge instructions.

## 2023-01-09 NOTE — HISTORY OF PRESENT ILLNESS
[Home] : at home, [unfilled] , at the time of the visit. [Medical Office: ([facility])___] : at the medical office located in  [Verbal consent obtained from patient] : the patient, [unfilled] [FreeTextEntry1] : Mr. Caro is a 67 year old male presenting to the office via video call for a follow up visit for abnormal PFTs, allergic rhinitis, adenocarcinoma of the lung, COPD, GERD, LILY and shortness of breath. His chief complaint is\par \par -he notes coughing \par -he notes tested positive for Covid-19 currently in [place] \par -he notes intermittent reflux \par -he notes vision is stable \par -he notes intermittent SOB\par -he notes he is fatigued \par -he notes its his second time getting Covid-19 first time was 07/2022\par \par -he denies any headaches, nausea, emesis, fever, chills, sweats, chest pain, chest pressure, coughing, wheezing, palpitations, constipation, diarrhea, vertigo, dysphagia, heartburn, reflux, itchy eyes, itchy ears, leg swelling, leg pain, arthralgias, myalgias, or sour taste in the mouth.

## 2023-01-09 NOTE — ASSESSMENT
[FreeTextEntry1] : Mr. Caro is a 67 year old Male who has a history of BPH, hypothyroid, COPD, multiple lung cancers, OSAS, allergy, and GERD. He presents to the office today via video call. He is doing well from a pulmonary perspective. His number one complaint is Abnormal CTL IMELDA 1 cm nodule - suture line: DDx CA/ S/p CRS evaluation c/w colonic polyp- stable- exception [place] - ?RT pneumonitis, now w/ Sx Covid-19 1/2023\par \par \par His shortness of breath is multifactorial due to\par - history of COPD from prior smoking\par - restrictive disfunction due to multiple lung surgeries \par - obesity/out of shape \par - vocal cords\par - poor mechanics of breathing\par \par Problem 1: COPD\par -nebulizer with albuterol up to QID (0.83) \par - continue Symbicort 160 2 inhalations BID\par - continue Incruse 1 inhalation QD (after Symbicort )\par - follow up with pulmonary rehab\par \par -COPD is a progressive disease and although it can’t be cured , appropriate management can slow its progression, reduce frequency and severity of exacerbations, and improve symptoms and the patient quality of life. Hospitalizations are the greatest contributor to the total COPD costs and account for up to 87% of total COPD related costs. Exacerbations are the main cause of admissions and subsequently account for the 40-75% of COPD costs. Inhaled maintenance therapy reduces the incidence of exacerbations in patients with stable COPD. Incorrect inhaler use and nonadherence are major obstacles to achieving COPD treatment goals. Many COPD patients have challenges (impaired inhalation, limited dexterity, reduced cognition: that limit their ability to correctly use their COPD treatment devices resulting in reduced symptom control. Of most importance is smoking cessation and early intervention with respiratory illnesses and contemplation for pulmonary rehab to enhance quality of life. \par \par Problem 1A: ?RT pneumonitis\par -CT schedule\par   -if (+), consider prednisone Rx\par \par Problem 1B: Cardiac \par -[place]  F/p\par -Coronary CT yearly\par \par Problem 1C : COVID-19 infection  /2023\par -add Paxlovid 5 day course\par -recommended 10 day quarantine\par -recommended Tayla [place] Cold and Flu\par  \par \par Problem 2: Obesity (improved)\par -Weight loss, exercise, and diet control were discussed and are highly encouraged. Treatment options were given such as, aqua therapy, and contacting a nutritionist. Recommended to use the elliptical, stationary bike, less use of treadmill. Obesity is associated with worsening asthma, shortness of breath, and potential for cardiac disease, diabetes, and other underlying medical conditions. \par \par Problem 3: allergies/sinuses\par - continue Astelin 0.15% 1 sniff/nostril BID\par - continue Flonase 1 spray each nostril BID\par - Off Xyzal 5 mg QHS due to prostate issues. \par -Environmental measures for allergies were encouraged including mattress and pillow cover, air purifier, and environmental controls. \par \par Problem 4: GERD\par - continue Omeprazole 40 mg BID (pre-breakfast/pre-dinner) \par -continue Pepcid 40mg QHS \par -Rule of 2s: avoid eating too much, eating too late, eating too spicy, eating two hours before bed\par -Things to avoid including overeating, spicy foods, tight clothing, eating within three hours of bed, this list is not all inclusive. \par -For treatment of reflux, possible options discussed including diet control, H2 blockers, PPIs, as well as coating motility agents discussed as treatment options. Timing of meals and proximity of last meal to sleep were discussed. If symptoms persist, a formal gastrointestinal evaluation is needed. \par \par Problem 5: sleep apnea,\par - side sleeping (positional change)\par -has refused treatment ; chinstrap\par -Sleep apnea is associated with adverse clinical consequences which an affect most organ systems. Cardiovascular disease risk includes arrhythmias, atrial fibrillation, hypertension, coronary artery disease, and stroke. Metabolic disorders include diabetes type 2, non-alcoholic fatty liver disease. Mood disorder especially depression; and cognitive decline especially in the elderly. Associations with chronic reflux/Zamudio’s esophagus some but not all inclusive. \par -Reasons to assess this include arousal consistent with hypopnea; respiratory events most prominent in REM sleep or supine position; therefore sleep staging and body position are important for accurate diagnosis and estimation of AHI. \par \par problem 6: lung cancer screening\par -recommended follow up chest CT yearly, s/p CT 7/18/2021 - next 7/2022 \par -Recommend CTS evaluation with Dr. Domingo\par - Lung cancer screening is recommended for people between the ages of 55 and 80 with prior 30+ pack year smoking histories. There is irrefutable evidence for realization of lung cancer screening based on two large randomized control trials demonstrating relative reduction in lung cancer mortality for patients undergoing low-dose CT scanning. Risks and benefits reviewed with the patient \par \par Problem 7a: COVID-19 precautionary Immune Support Recommendations:\par -Covid-19 vaccine x3 \par -OTC Vitamin C 500mg BID \par -OTC Quercetin 250-500mg BID \par -OTC Zinc 75-100mg per day \par -OTC Melatonin 1 or 2mg a night \par -OTC Vitamin D 1-4000mg per day \par -OTC Tonic Water 8oz per day\par -Water 0.5-1 gallon per day\par \par problem 7: health maintenance \par -recommended to go to pulmonary rehab\par -he is has Xanax 0.5 mg PRN for anxiety\par -received influenza vaccination 2019\par -recommended strep pneumonia vaccines: Prevnar-13 vaccine, followed by Pneumo vaccine 23 one year following\par -recommended early intervention for URIs\par -recommended regular osteoporosis evaluations\par -recommended early dermatological evaluations\par -recommended after the age of 50 to the age of 70, colonoscopy every 5 years \par  \par \par F/U in 4-6 months\par He is encouraged to call with any questions, concerns, and changes. \par

## 2023-01-10 ENCOUNTER — APPOINTMENT (OUTPATIENT)
Dept: PULMONOLOGY | Facility: CLINIC | Age: 68
End: 2023-01-10

## 2023-01-30 ENCOUNTER — TRANSCRIPTION ENCOUNTER (OUTPATIENT)
Age: 68
End: 2023-01-30

## 2023-01-31 ENCOUNTER — APPOINTMENT (OUTPATIENT)
Dept: PULMONOLOGY | Facility: CLINIC | Age: 68
End: 2023-01-31
Payer: MEDICARE

## 2023-01-31 DIAGNOSIS — J01.90 ACUTE SINUSITIS, UNSPECIFIED: ICD-10-CM

## 2023-01-31 DIAGNOSIS — U07.1 COVID-19: ICD-10-CM

## 2023-01-31 PROCEDURE — 99214 OFFICE O/P EST MOD 30 MIN: CPT | Mod: CS,95

## 2023-01-31 RX ORDER — PREDNISONE 10 MG/1
10 TABLET ORAL
Qty: 50 | Refills: 0 | Status: ACTIVE | COMMUNITY
Start: 2023-01-31 | End: 1900-01-01

## 2023-01-31 RX ORDER — NIRMATRELVIR AND RITONAVIR 300-100 MG
20 X 150 MG & KIT ORAL
Qty: 1 | Refills: 0 | Status: DISCONTINUED | COMMUNITY
Start: 2023-01-09 | End: 2023-01-31

## 2023-01-31 RX ORDER — AMOXICILLIN AND CLAVULANATE POTASSIUM 875; 125 MG/1; MG/1
875-125 TABLET, COATED ORAL
Qty: 28 | Refills: 0 | Status: ACTIVE | COMMUNITY
Start: 2023-01-31 | End: 1900-01-01

## 2023-01-31 NOTE — ASSESSMENT
[FreeTextEntry1] : Mr. Caro is a 67 year old Male who has a history of BPH, hypothyroid, COPD, multiple lung cancers, OSAS, allergy, and GERD. He presents to the office today via video call. He is doing well from a pulmonary perspective. His number one complaint is Abnormal CTL IMELDA 1 cm nodule - suture line: DDx CA/ S/p CRS evaluation c/w colonic polyp- stable- exception Lanier - ?RT pneumonitis, now w acute sinusitis and COPD exacerbation\par \par \par His shortness of breath is multifactorial due to\par - history of COPD from prior smoking\par - restrictive disfunction due to multiple lung surgeries \par - obesity/out of shape \par - vocal cords\par - poor mechanics of breathing\par \par Problem 1: COPD\par -Add Prednisone 20mg for 7 days then 10mg for 7 days \par -nebulizer with albuterol up to QID (0.83) \par - continue Symbicort 160 2 inhalations BID\par - continue Incruse 1 inhalation QD (after Symbicort )\par - follow up with pulmonary rehab\par \par -COPD is a progressive disease and although it can’t be cured , appropriate management can slow its progression, reduce frequency and severity of exacerbations, and improve symptoms and the patient quality of life. Hospitalizations are the greatest contributor to the total COPD costs and account for up to 87% of total COPD related costs. Exacerbations are the main cause of admissions and subsequently account for the 40-75% of COPD costs. Inhaled maintenance therapy reduces the incidence of exacerbations in patients with stable COPD. Incorrect inhaler use and nonadherence are major obstacles to achieving COPD treatment goals. Many COPD patients have challenges (impaired inhalation, limited dexterity, reduced cognition: that limit their ability to correctly use their COPD treatment devices resulting in reduced symptom control. Of most importance is smoking cessation and early intervention with respiratory illnesses and contemplation for pulmonary rehab to enhance quality of life. \par -Information sheet given to the patient to be reviewed, this medication is never to be used without consulting the prescribing physician. Proper dietary restraint is necessary specifically salt containing foods, if any reaction may occur should be reported. \par \par Problem 1A: ?RT pneumonitis\par -CT schedule\par   -if (+), consider prednisone Rx\par \par Problem 1B: Cardiac \par -Dubowsky  F/p\par -Coronary CT yearly\par \par Problem 1C : COVID-19 infection  /2023\par -add Paxlovid 5 day course\par -recommended 10 day quarantine\par -recommended Tayla [place] Cold and Flu\par \par Problem 1D: Acute Sinusitis\par -add Augmentin 875 mg BID \par \par  \par \par Problem 2: Obesity (improved)\par -Weight loss, exercise, and diet control were discussed and are highly encouraged. Treatment options were given such as, aqua therapy, and contacting a nutritionist. Recommended to use the elliptical, stationary bike, less use of treadmill. Obesity is associated with worsening asthma, shortness of breath, and potential for cardiac disease, diabetes, and other underlying medical conditions. \par \par Problem 3: allergies/sinuses\par - continue Astelin 0.15% 1 sniff/nostril BID\par - continue Flonase 1 spray each nostril BID\par - Off Xyzal 5 mg QHS due to prostate issues. \par -Environmental measures for allergies were encouraged including mattress and pillow cover, air purifier, and environmental controls. \par \par Problem 4: GERD\par - continue Omeprazole 40 mg BID (pre-breakfast/pre-dinner) \par -continue Pepcid 40mg QHS \par -Rule of 2s: avoid eating too much, eating too late, eating too spicy, eating two hours before bed\par -Things to avoid including overeating, spicy foods, tight clothing, eating within three hours of bed, this list is not all inclusive. \par -For treatment of reflux, possible options discussed including diet control, H2 blockers, PPIs, as well as coating motility agents discussed as treatment options. Timing of meals and proximity of last meal to sleep were discussed. If symptoms persist, a formal gastrointestinal evaluation is needed. \par \par Problem 5: sleep apnea,\par - side sleeping (positional change)\par -has refused treatment ; chinstrap\par -Sleep apnea is associated with adverse clinical consequences which an affect most organ systems. Cardiovascular disease risk includes arrhythmias, atrial fibrillation, hypertension, coronary artery disease, and stroke. Metabolic disorders include diabetes type 2, non-alcoholic fatty liver disease. Mood disorder especially depression; and cognitive decline especially in the elderly. Associations with chronic reflux/Zamudio’s esophagus some but not all inclusive. \par -Reasons to assess this include arousal consistent with hypopnea; respiratory events most prominent in REM sleep or supine position; therefore sleep staging and body position are important for accurate diagnosis and estimation of AHI. \par \par problem 6: lung cancer screening\par -recommended follow up chest CT yearly, s/p CT 7/18/2021 - next 7/2022 \par -Recommend CTS evaluation with Dr. Domingo\par - Lung cancer screening is recommended for people between the ages of 55 and 80 with prior 30+ pack year smoking histories. There is irrefutable evidence for realization of lung cancer screening based on two large randomized control trials demonstrating relative reduction in lung cancer mortality for patients undergoing low-dose CT scanning. Risks and benefits reviewed with the patient \par \par Problem 7a: COVID-19 precautionary Immune Support Recommendations:\par -Covid-19 vaccine x3 \par -OTC Vitamin C 500mg BID \par -OTC Quercetin 250-500mg BID \par -OTC Zinc 75-100mg per day \par -OTC Melatonin 1 or 2mg a night \par -OTC Vitamin D 1-4000mg per day \par -OTC Tonic Water 8oz per day\par -Water 0.5-1 gallon per day\par \par problem 7: health maintenance \par -recommended to go to pulmonary rehab\par -he is has Xanax 0.5 mg PRN for anxiety\par -received influenza vaccination 2019\par -recommended strep pneumonia vaccines: Prevnar-13 vaccine, followed by Pneumo vaccine 23 one year following\par -recommended early intervention for URIs\par -recommended regular osteoporosis evaluations\par -recommended early dermatological evaluations\par -recommended after the age of 50 to the age of 70, colonoscopy every 5 years \par  \par \par F/U in 4-6 months\par He is encouraged to call with any questions, concerns, and changes. \par

## 2023-01-31 NOTE — ADDENDUM
[FreeTextEntry1] : Documented by Radames Elizabeth acting as a scribe for Dr. Cliff Marvin on 01/31/2023.\par \par All medical record entries made by the Scribe were at my, Dr. Cliff Marvin's, direction and personally dictated by me on 01/31/2023. I have reviewed the chart and agree that the record accurately reflects my personal performance of the history, physical exam, assessment and plan. I have also personally directed, reviewed, and agree with the discharge instructions.

## 2023-01-31 NOTE — HISTORY OF PRESENT ILLNESS
[Home] : at home, [unfilled] , at the time of the visit. [Medical Office: ([facility])___] : at the medical office located in  [Verbal consent obtained from patient] : the patient, [unfilled] [FreeTextEntry1] : Mr. Caro is a 67 year old male presenting to the office via video call for a follow up visit for abnormal PFTs, allergic rhinitis, adenocarcinoma of the lung, COPD, GERD, LILY and shortness of breath. His chief complaint is\par \par -he notes some residual Sx from COVID\par -he notes that he cannot get a deep breath\par -he notes coughing up green mucus\par -he notes PND \par -he notes taking Tayla [place] Cold and Flu\par -he notes that he is using his nebulizer\par \par \par -patient denies any headaches, nausea, vomiting, fever, chills, sweats, chest pain, chest pressure, palpitations, wheezing, fatigue, diarrhea, constipation, dysphagia, myalgias, dizziness, leg swelling, leg pain, itchy eyes, itchy ears, heartburn, reflux or sour taste in the mouth  84

## 2023-02-21 ENCOUNTER — TRANSCRIPTION ENCOUNTER (OUTPATIENT)
Age: 68
End: 2023-02-21

## 2023-04-06 ENCOUNTER — APPOINTMENT (OUTPATIENT)
Dept: PULMONOLOGY | Facility: CLINIC | Age: 68
End: 2023-04-06
Payer: MEDICARE

## 2023-04-06 VITALS
WEIGHT: 213 LBS | SYSTOLIC BLOOD PRESSURE: 120 MMHG | TEMPERATURE: 97.2 F | DIASTOLIC BLOOD PRESSURE: 70 MMHG | OXYGEN SATURATION: 95 % | HEIGHT: 76 IN | BODY MASS INDEX: 25.94 KG/M2 | RESPIRATION RATE: 16 BRPM | HEART RATE: 102 BPM

## 2023-04-06 PROCEDURE — 99214 OFFICE O/P EST MOD 30 MIN: CPT | Mod: 25

## 2023-04-06 PROCEDURE — 94729 DIFFUSING CAPACITY: CPT

## 2023-04-06 PROCEDURE — ZZZZZ: CPT

## 2023-04-06 PROCEDURE — 95012 NITRIC OXIDE EXP GAS DETER: CPT

## 2023-04-06 PROCEDURE — 94727 GAS DIL/WSHOT DETER LNG VOL: CPT

## 2023-04-06 PROCEDURE — 94010 BREATHING CAPACITY TEST: CPT

## 2023-04-06 NOTE — PROCEDURE
[FreeTextEntry1] : Full PFT revealed moderate restrictive mild obstructive flows, with a FEV1 of 1.73L, which is 40% of predicted, moderate lung volumes, and a moderate diffusion of 15.9, which is 62% of predicted, with a normal flow volume loop\par \par \par FENO was 19; normal value being less than 25\par Fractional exhaled nitric oxide (FENO) is regarded as a simple, noninvasive method for assessing eosinophilic airway inflammation. Produced by a variety of cells within the lung, nitric oxide (NO) concentrations are generally low in healthy individuals. However, high concentrations of NO appear to be involved in nonspecific host defense mechanisms and chronic inflammatory diseases such as asthma. The American Thoracic Society (ATS) therefore has recommended using FENO to aid in the diagnosis and monitoring of eosinophilic airway inflammation and asthma, and for identifying steroid responsive individuals whose chronic respiratory symptoms may be airway inflammation.

## 2023-04-06 NOTE — ASSESSMENT
[FreeTextEntry1] : Mr. Caro is a 67 year old Male who has a history of BPH, hypothyroid, COPD, multiple lung cancers, OSAS, allergy, and GERD. He presents to the office today. He is doing well from a pulmonary perspective. His number one complaint is Abnormal CTL IMELDA 1 cm nodule - suture line: DDx CA/ S/p CRS evaluation c/w colonic polyp- stable- exception [place] - ?RT pneumonitis, s/p acute sinusitis and COPD exacerbation 1/31/2023 \par \par \par His shortness of breath is multifactorial due to\par - history of COPD from prior smoking\par - restrictive disfunction due to multiple lung surgeries \par - obesity/out of shape \par - vocal cords\par - poor mechanics of breathing\par \par Problem 1: COPD (Semi-Active)\par -s/p Prednisone 20mg for 7 days then 10mg for 7 days 1/2023 \par -nebulizer with albuterol up to QID (0.83) \par - continue Symbicort 160 2 inhalations BID\par -add Spiriva at 2 inhalations QAM (after Symbicort )\par - follow up with pulmonary rehab\par \par -COPD is a progressive disease and although it can’t be cured , appropriate management can slow its progression, reduce frequency and severity of exacerbations, and improve symptoms and the patient quality of life. Hospitalizations are the greatest contributor to the total COPD costs and account for up to 87% of total COPD related costs. Exacerbations are the main cause of admissions and subsequently account for the 40-75% of COPD costs. Inhaled maintenance therapy reduces the incidence of exacerbations in patients with stable COPD. Incorrect inhaler use and nonadherence are major obstacles to achieving COPD treatment goals. Many COPD patients have challenges (impaired inhalation, limited dexterity, reduced cognition: that limit their ability to correctly use their COPD treatment devices resulting in reduced symptom control. Of most importance is smoking cessation and early intervention with respiratory illnesses and contemplation for pulmonary rehab to enhance quality of life. \par -Information sheet given to the patient to be reviewed, this medication is never to be used without consulting the prescribing physician. Proper dietary restraint is necessary specifically salt containing foods, if any reaction may occur should be reported. \par \par Problem 1A: ?RT pneumonitis\par -CT schedule 4/6/2023 \par   -if (+), consider prednisone Rx\par \par Problem 1B: Cardiac \par -Dub[place]sky  F/p\par -Coronary CT yearly\par \par Problem 1C : COVID-19 infection  /2023\par -add Paxlovid 5 day course\par -recommended 10 day quarantine\par -recommended Tayla [place] Cold and Flu\par \par Problem 1D: Acute Sinusitis - resolved 1/2023\par -s/p Augmentin 875 mg BID \par \par  \par Problem 2: Obesity (improved)\par -Weight loss, exercise, and diet control were discussed and are highly encouraged. Treatment options were given such as, aqua therapy, and contacting a nutritionist. Recommended to use the elliptical, stationary bike, less use of treadmill. Obesity is associated with worsening asthma, shortness of breath, and potential for cardiac disease, diabetes, and other underlying medical conditions. \par \par Problem 3: allergies/sinuses\par - continue Astelin 0.15% 1 sniff/nostril BID\par - continue Flonase 1 spray each nostril BID\par - Off Xyzal 5 mg QHS due to prostate issues. \par -Environmental measures for allergies were encouraged including mattress and pillow cover, air purifier, and environmental controls. \par \par Problem 4: GERD\par - continue Omeprazole 40 mg BID (pre-breakfast/pre-dinner) \par -continue Pepcid 40mg QHS \par -Rule of 2s: avoid eating too much, eating too late, eating too spicy, eating two hours before bed\par -Things to avoid including overeating, spicy foods, tight clothing, eating within three hours of bed, this list is not all inclusive. \par -For treatment of reflux, possible options discussed including diet control, H2 blockers, PPIs, as well as coating motility agents discussed as treatment options. Timing of meals and proximity of last meal to sleep were discussed. If symptoms persist, a formal gastrointestinal evaluation is needed. \par \par Problem 5: sleep apnea,\par - side sleeping (positional change)\par -has refused treatment ; chinstrap\par -Sleep apnea is associated with adverse clinical consequences which an affect most organ systems. Cardiovascular disease risk includes arrhythmias, atrial fibrillation, hypertension, coronary artery disease, and stroke. Metabolic disorders include diabetes type 2, non-alcoholic fatty liver disease. Mood disorder especially depression; and cognitive decline especially in the elderly. Associations with chronic reflux/Zamudio’s esophagus some but not all inclusive. \par -Reasons to assess this include arousal consistent with hypopnea; respiratory events most prominent in REM sleep or supine position; therefore sleep staging and body position are important for accurate diagnosis and estimation of AHI. \par \par problem 6: lung cancer screening\par -recommended follow up chest CT yearly, s/p CT 7/18/2021 - next 7/2022 \par -Recommend CTS evaluation with Dr. Domingo\par - Lung cancer screening is recommended for people between the ages of 55 and 80 with prior 30+ pack year smoking histories. There is irrefutable evidence for realization of lung cancer screening based on two large randomized control trials demonstrating relative reduction in lung cancer mortality for patients undergoing low-dose CT scanning. Risks and benefits reviewed with the patient \par \par Problem 7a: COVID-19 precautionary Immune Support Recommendations:\par -Covid-19 vaccine x3 \par -OTC Vitamin C 500mg BID \par -OTC Quercetin 250-500mg BID \par -OTC Zinc 75-100mg per day \par -OTC Melatonin 1 or 2mg a night \par -OTC Vitamin D 1-4000mg per day \par -OTC Tonic Water 8oz per day\par -Water 0.5-1 gallon per day\par \par problem 7: health maintenance \par -recommended to go to pulmonary rehab\par -he is has Xanax 0.5 mg PRN for anxiety\par -received influenza vaccination 2019\par -recommended strep pneumonia vaccines: Prevnar-13 vaccine, followed by Pneumo vaccine 23 one year following\par -recommended early intervention for URIs\par -recommended regular osteoporosis evaluations\par -recommended early dermatological evaluations\par -recommended after the age of 50 to the age of 70, colonoscopy every 5 years \par  \par \par F/U in 4-6 months\par He is encouraged to call with any questions, concerns, and changes. \par

## 2023-04-06 NOTE — HISTORY OF PRESENT ILLNESS
[FreeTextEntry1] : Mr. Caro is a 67 year old male presenting to the office  for a follow up visit for abnormal PFTs, allergic rhinitis, adenocarcinoma of the lung, COPD, GERD, LILY and shortness of breath. His chief complaint is\par - he notes he has not been feeling worse but he feels like his body took a beating over the winter with COVID and the other respiratory issue he had afterwards. \par - he notes he had a pain at the base of his ribs in his back \par - no chest pains \par - he has difficulty swallowing every once in awhile\par - he notes his vision is fine \par - he notes his weight is stable \par - he notes he has not been exercising much \par - he notes today he's getting a scan today after this appointment \par - he notes his sinuses have been okay \par - no coughing or wheezing right now \par - he notes he has not been taking his Incruse because he was told it was not covered under his insurance but he has been taking Symbicort.\par -He denies any visual issues, headaches, nausea, vomiting, fever, chills, sweats, chest pains, chest pressure, diarrhea, constipation, dysphagia, myalgia, dizziness, leg swelling, leg pain, itchy eyes, itchy ears, heartburn, reflux, or sour taste in the mouth.\par

## 2023-04-06 NOTE — ADDENDUM
[FreeTextEntry1] : Documented by Cara Banda acting as a scribe for Dr. Cliff Marvin on 04/06/2023 \par \par All medical record entries made by the Scribe were at my, Dr. Cliff Marvin's, direction and personally dictated by me on 04/06/2023 . I have reviewed the chart and agree that the record accurately reflects my personal performance of the history, physical exam, assessment and plan. I have also personally directed, reviewed, and agree with the discharge instructions.

## 2023-04-06 NOTE — PHYSICAL EXAM
[No Acute Distress] : no acute distress [Well Nourished] : well nourished [Well Groomed] : well groomed [No Deformities] : no deformities [Well Developed] : well developed [Normal Oropharynx] : normal oropharynx [Normal Appearance] : normal appearance [No Neck Mass] : no neck mass [Normal Rate/Rhythm] : normal rate/rhythm [Normal S1, S2] : normal s1, s2 [No Murmurs] : no murmurs [No Resp Distress] : no resp distress [Clear to Auscultation Bilaterally] : clear to auscultation bilaterally [No Abnormalities] : no abnormalities [Benign] : benign [Normal Gait] : normal gait [No Clubbing] : no clubbing [No Cyanosis] : no cyanosis [No Edema] : no edema [FROM] : FROM [Normal Color/ Pigmentation] : normal color/ pigmentation [No Focal Deficits] : no focal deficits [Oriented x3] : oriented x3 [Normal Affect] : normal affect

## 2023-04-07 ENCOUNTER — NON-APPOINTMENT (OUTPATIENT)
Age: 68
End: 2023-04-07

## 2023-04-11 ENCOUNTER — TRANSCRIPTION ENCOUNTER (OUTPATIENT)
Age: 68
End: 2023-04-11

## 2023-04-11 RX ORDER — TIOTROPIUM BROMIDE INHALATION SPRAY 3.12 UG/1
2.5 SPRAY, METERED RESPIRATORY (INHALATION) DAILY
Qty: 3 | Refills: 1 | Status: DISCONTINUED | COMMUNITY
Start: 2023-04-06 | End: 2023-04-11

## 2023-04-12 ENCOUNTER — RX RENEWAL (OUTPATIENT)
Age: 68
End: 2023-04-12

## 2023-05-14 NOTE — ASU DISCHARGE PLAN (ADULT/PEDIATRIC) - "IF YOU OR YOUR GUARDIAN/FAMILY IS A SMOKER, IT IS IMPORTANT FOR YOUR HEALTH TO STOP SMOKING. PLEASE BE AWARE THAT SECOND HAND SMOKE IS ALSO HARMFUL."
Statement Selected Pt. reporting acute-onset generalized weakness and inability to walk, unclear etiology but may be related to acute encephalopathy as above. b/l LE weakness appreciated on exam. No walker/cane use at home  - TSH from prior admission wnl (03/2023)  - PT consult-Outpt PT   - s/p  IVFs (LR 125cc/hr x 8h)  - monitor BMP and replete electrolytes PRN  - fall precautions

## 2023-06-10 NOTE — PATIENT PROFILE ADULT - LIVING ENVIRONMENT
Received call from pt's sister states that patient speech got worsen since \" since last episode she went to hospital\". Sister states that patient only whispering sounds, they cannot understand what she saying. Sister wants to get referral to ENT to find out what's going on. ANP Agata Anderson notified.    no

## 2023-06-29 ENCOUNTER — OUTPATIENT (OUTPATIENT)
Dept: OUTPATIENT SERVICES | Facility: HOSPITAL | Age: 68
LOS: 1 days | End: 2023-06-29
Payer: MEDICARE

## 2023-06-29 ENCOUNTER — TRANSCRIPTION ENCOUNTER (OUTPATIENT)
Age: 68
End: 2023-06-29

## 2023-06-29 ENCOUNTER — RESULT REVIEW (OUTPATIENT)
Age: 68
End: 2023-06-29

## 2023-06-29 VITALS
HEIGHT: 76 IN | SYSTOLIC BLOOD PRESSURE: 125 MMHG | DIASTOLIC BLOOD PRESSURE: 66 MMHG | WEIGHT: 214.95 LBS | TEMPERATURE: 96 F | RESPIRATION RATE: 20 BRPM | HEART RATE: 87 BPM | OXYGEN SATURATION: 96 %

## 2023-06-29 VITALS — HEART RATE: 83 BPM | SYSTOLIC BLOOD PRESSURE: 121 MMHG | DIASTOLIC BLOOD PRESSURE: 76 MMHG | RESPIRATION RATE: 189 BRPM

## 2023-06-29 DIAGNOSIS — E89.0 POSTPROCEDURAL HYPOTHYROIDISM: Chronic | ICD-10-CM

## 2023-06-29 DIAGNOSIS — K22.70 BARRETT'S ESOPHAGUS WITHOUT DYSPLASIA: ICD-10-CM

## 2023-06-29 DIAGNOSIS — Z98.890 OTHER SPECIFIED POSTPROCEDURAL STATES: Chronic | ICD-10-CM

## 2023-06-29 DIAGNOSIS — C34.92 MALIGNANT NEOPLASM OF UNSPECIFIED PART OF LEFT BRONCHUS OR LUNG: Chronic | ICD-10-CM

## 2023-06-29 PROCEDURE — 45385 COLONOSCOPY W/LESION REMOVAL: CPT | Mod: PT

## 2023-06-29 PROCEDURE — 88305 TISSUE EXAM BY PATHOLOGIST: CPT

## 2023-06-29 PROCEDURE — 45380 COLONOSCOPY AND BIOPSY: CPT | Mod: XS,PT

## 2023-06-29 PROCEDURE — 43239 EGD BIOPSY SINGLE/MULTIPLE: CPT

## 2023-06-29 PROCEDURE — 94640 AIRWAY INHALATION TREATMENT: CPT

## 2023-06-29 PROCEDURE — 88305 TISSUE EXAM BY PATHOLOGIST: CPT | Mod: 26

## 2023-06-29 DEVICE — NET RETRV ROT ROTH 2.5MMX230CM: Type: IMPLANTABLE DEVICE | Status: FUNCTIONAL

## 2023-06-29 RX ORDER — ALPRAZOLAM 0.25 MG
1 TABLET ORAL
Qty: 0 | Refills: 0 | DISCHARGE

## 2023-06-29 RX ORDER — ALBUTEROL 90 UG/1
2 AEROSOL, METERED ORAL
Refills: 0 | DISCHARGE

## 2023-06-29 RX ORDER — LEVOTHYROXINE SODIUM 125 MCG
1 TABLET ORAL
Qty: 0 | Refills: 0 | DISCHARGE

## 2023-06-29 RX ORDER — LEVOCETIRIZINE DIHYDROCHLORIDE 0.5 MG/ML
1 SOLUTION ORAL
Refills: 0 | DISCHARGE

## 2023-06-29 RX ORDER — ROSUVASTATIN CALCIUM 5 MG/1
1 TABLET ORAL
Refills: 0 | DISCHARGE

## 2023-06-29 RX ORDER — SODIUM CHLORIDE 9 MG/ML
500 INJECTION INTRAMUSCULAR; INTRAVENOUS; SUBCUTANEOUS
Refills: 0 | Status: COMPLETED | OUTPATIENT
Start: 2023-06-29 | End: 2023-06-29

## 2023-06-29 RX ORDER — BUDESONIDE AND FORMOTEROL FUMARATE DIHYDRATE 160; 4.5 UG/1; UG/1
2 AEROSOL RESPIRATORY (INHALATION)
Qty: 0 | Refills: 0 | DISCHARGE

## 2023-06-29 RX ORDER — IPRATROPIUM/ALBUTEROL SULFATE 18-103MCG
3 AEROSOL WITH ADAPTER (GRAM) INHALATION ONCE
Refills: 0 | Status: COMPLETED | OUTPATIENT
Start: 2023-06-29 | End: 2023-06-29

## 2023-06-29 RX ADMIN — Medication 3 MILLILITER(S): at 08:37

## 2023-06-29 RX ADMIN — SODIUM CHLORIDE 30 MILLILITER(S): 9 INJECTION INTRAMUSCULAR; INTRAVENOUS; SUBCUTANEOUS at 08:16

## 2023-06-29 NOTE — ASU PATIENT PROFILE, ADULT - NSICDXPASTSURGICALHX_GEN_ALL_CORE_FT
PAST SURGICAL HISTORY:  H/O thyroidectomy total 4/ 2016    History of lung surgery 2015 benign lesion @ left lung    History of prostate surgery TURP 2014 and 2017    Lung cancer, left left upper lobe VATS 2010    Mediastinum Neoplasm S/P mediastinoscopy 1998    S/P ear surgery unspecified cholesteatoma - right tympanomastoidectomy 12/16/2016    S/P Lobectomy of Lung right upper [facility]

## 2023-06-29 NOTE — PRE PROCEDURE NOTE - PRE PROCEDURE EVALUATION
Attending Physician:       Mike Lee                     Procedure: EGD/colonoscopy    Indication for Procedure: Zamudio's esophagus,  hx of colon polyps  ________________________________________________________  PAST MEDICAL & SURGICAL HISTORY:  COPD (Chronic Obstructive Pulmonary Disease)  treated with inhalers      Lung Cancer  right upper lobe 1997      LILY (Obstructive Sleep Apnea)  does not use CPAP, last used it 15 years ago      Pneumonia  1990      BPH (Benign Prostatic Hyperplasia)      Anxiety      GERD (Gastroesophageal Reflux Disease)      Lung cancer, left  upper lobe 2010    Lung cancer 2022 (Cyberknife treatment)      Umbilical hernia      Abdominal hernia      Thyroid nodule      Unspecified cholesteatoma, right ear  12/2016      History of chemotherapy  and radiation 1998      Zamudio's esophagus without dysplasia      History of colon polyps      Former smoker      S/P Lobectomy of Lung  right upper 1997, [facility]      Mediastinum Neoplasm  S/P mediastinoscopy 1998      Lung cancer, left  left upper lobe VATS 2010      H/O thyroidectomy for thyroid cancer  total 4/ 2016      History of prostate surgery  TURP 2014 and 2017  Prostate artery embolization 2020      History of lung surgery  2015 benign lesion @ left lung      S/P ear surgery  unspecified cholesteatoma - right tympanomastoidectomy 12/16/2016    Resection of left lobe of liver (benign) 2/2021        ALLERGIES:  adhesives (Hives)  Clinda-Derm (Short breath)    HOME MEDICATIONS:    levothyroxine 200 mcg (0.2 mg) oral tablet: 1 tab(s) orally once a day  omeprazole 20 mg oral delayed release capsule: 1 cap(s) orally 2 times a day  Symbicort 160 mcg-4.5 mcg/inh inhalation aerosol: 2 puff(s) inhaled 2 times a day  Vitamin D3 2000 intl units oral capsule: 2 tab(s) orally once a day  Xanax 0.5 mg oral tablet: 1 tab(s) orally , As Needed  rosuvastatin 5 mg daily  vitamin B12  levocetirizine 2.5 mg daily  albuterol prn    AICD/PPM: [ ] yes   [x ] no    PERTINENT LAB DATA: CBC, CMP nl                    PHYSICAL EXAMINATION:      HR: --  BP: --  SpO2: --   Weight: 212 lb (96 kg)  Height: 6' 4: (193 cm)    Constitutional: NAD  Respiratory: CTAB/L  Cardiovascular: S1 and S2  Gastrointestinal: BS+, soft, NT/ND        ASA Class: I [ ]  II [ ]  III [x ]  IV [ ]    COMMENTS:    The patient is a suitable candidate for the planned procedure unless box checked [ ]  No, explain:     Attending Physician:       Mike Lee                     Procedure: EGD/colonoscopy    Indication for Procedure: Zamudio's esophagus,  hx of colon polyps  ________________________________________________________  PAST MEDICAL & SURGICAL HISTORY:  COPD (Chronic Obstructive Pulmonary Disease)  treated with inhalers      Lung Cancer  right upper lobe 1997      LILY (Obstructive Sleep Apnea)  does not use CPAP, last used it 15 years ago      Pneumonia  1990      BPH (Benign Prostatic Hyperplasia)      Anxiety      GERD (Gastroesophageal Reflux Disease)      Lung cancer, left  upper lobe 2010    Lung cancer 2022 (Cyberknife treatment)      Umbilical hernia      Abdominal hernia      Thyroid nodule      Unspecified cholesteatoma, right ear  12/2016      History of chemotherapy  and radiation 1998      Zamudio's esophagus without dysplasia      History of colon polyps      Former smoker      S/P Lobectomy of Lung  right upper 1997, [facility]      Mediastinum Neoplasm  S/P mediastinoscopy 1998      Lung cancer, left  left upper lobe VATS 2010      H/O thyroidectomy for thyroid cancer  total 4/ 2016      History of prostate surgery  TURP 2014 and 2017  Prostate artery embolization 2020      History of lung surgery  2015 benign lesion @ left lung      S/P ear surgery  unspecified cholesteatoma - right tympanomastoidectomy 12/16/2016    Resection of left lobe of liver (benign) 2/2021        ALLERGIES:  adhesives (Hives)  Clinda-Derm (Short breath)    HOME MEDICATIONS:    levothyroxine 200 mcg (0.2 mg) oral tablet: 1 tab(s) orally once a day  omeprazole 20 mg oral delayed release capsule: 1 cap(s) orally 2 times a day  Symbicort 160 mcg-4.5 mcg/inh inhalation aerosol: 2 puff(s) inhaled 2 times a day  Vitamin D3 2000 intl units oral capsule: 2 tab(s) orally once a day  Xanax 0.5 mg oral tablet: 1 tab(s) orally , As Needed  rosuvastatin 5 mg daily  vitamin B12  levocetirizine 2.5 mg daily  albuterol prn    AICD/PPM: [ ] yes   [x ] no    PERTINENT LAB DATA: CBC, CMP nl                    PHYSICAL EXAMINATION:      HR: --  87  BP: --  125/66  SpO2: --  95%  Weight: 212 lb (96 kg)  Height: 6' 4: (193 cm)    Constitutional: NAD  Respiratory: CTAB/L  Cardiovascular: S1 and S2  Gastrointestinal: BS+, soft, NT/ND        ASA Class: I [ ]  II [ ]  III [x ]  IV [ ]    COMMENTS:    The patient is a suitable candidate for the planned procedure unless box checked [ ]  No, explain:

## 2023-06-29 NOTE — ASU PATIENT PROFILE, ADULT - FALL HARM RISK - UNIVERSAL INTERVENTIONS
Bed in lowest position, wheels locked, appropriate side rails in place/Call bell, personal items and telephone in reach/Instruct patient to call for assistance before getting out of bed or chair/Non-slip footwear when patient is out of bed/[place] to call system/Physically safe environment - no spills, clutter or unnecessary equipment/Purposeful Proactive Rounding/Room/bathroom lighting operational, light cord in reach

## 2023-06-29 NOTE — ASU PREOP CHECKLIST - PATIENT'S PERSONAL PROPERTY GIVEN TO
----- Message from Diamante Galeano NP sent at 8/22/2022  7:34 AM CDT -----  Regarding: RE: XR  There appears to be some arthritis at this joint and small bone spurs you should follow up with podiatrist as discussed.  ----- Message -----  From: Rod Hopper Results In  Sent: 8/21/2022   8:31 PM CDT  To: [facility] Results Pool       on unit

## 2023-06-29 NOTE — ASU DISCHARGE PLAN (ADULT/PEDIATRIC) - WILL THE PATIENT ACCEPT THE PFIZER COVID-19 VACCINE IF ELIGIBLE AND IT IS AVAILABLE?
CC:  Michael Daviskett is here today for a follow up office visit for abdominal pain.  No Pain    Medications: medications verified and updated  Refills needed today?  YES  denies Latex allergy or sensitivity  Tobacco history: verified    Health Maintenance Due   Topic Date Due   • Influenza Vaccine (1) 09/01/2021     Patient is up to date, no discussion needed..   No

## 2023-06-29 NOTE — ASU DISCHARGE PLAN (ADULT/PEDIATRIC) - ASU DC SPECIAL INSTRUCTIONSFT
Office will call with pathology results in 7-10 days    Should have office or video visit in 1 year, next screening colonoscopy in 2 years.    Timing of next upper endoscopy to be determined after biopsy results are available.

## 2023-07-05 LAB — SURGICAL PATHOLOGY STUDY: SIGNIFICANT CHANGE UP

## 2023-08-21 ENCOUNTER — APPOINTMENT (OUTPATIENT)
Dept: PULMONOLOGY | Facility: CLINIC | Age: 68
End: 2023-08-21
Payer: MEDICARE

## 2023-08-21 VITALS
RESPIRATION RATE: 16 BRPM | BODY MASS INDEX: 26.06 KG/M2 | OXYGEN SATURATION: 97 % | TEMPERATURE: 97 F | DIASTOLIC BLOOD PRESSURE: 70 MMHG | HEART RATE: 88 BPM | SYSTOLIC BLOOD PRESSURE: 120 MMHG | HEIGHT: 76 IN | WEIGHT: 214 LBS

## 2023-08-21 DIAGNOSIS — F41.9 ANXIETY DISORDER, UNSPECIFIED: ICD-10-CM

## 2023-08-21 PROCEDURE — 94010 BREATHING CAPACITY TEST: CPT

## 2023-08-21 PROCEDURE — 99214 OFFICE O/P EST MOD 30 MIN: CPT | Mod: 25

## 2023-08-21 PROCEDURE — 95012 NITRIC OXIDE EXP GAS DETER: CPT

## 2023-08-21 NOTE — ASSESSMENT
[FreeTextEntry1] : Mr. Caro is a 68 year old Male who has a history of BPH, hypothyroid, COPD, multiple lung cancers, OSAS, allergy, and GERD. He presents to the office today. He is doing well from a pulmonary perspective. His number one complaint is Abnormal CTL IMELDA 1 cm nodule - suture line: DDx CA/ S/p CRS evaluation c/w colonic polyp- stable- exception [place] - ?RT pneumonitis, s/p acute sinusitis and COPD exacerbation 1/31/2023 - Stable except lethargy    His shortness of breath is multifactorial due to - history of COPD from prior smoking - restrictive disfunction due to multiple lung surgeries  - obesity/out of shape  - vocal cords - poor mechanics of breathing  Problem 1: COPD (quiet) -s/p Prednisone 20mg for 7 days then 10mg for 7 days 1/2023  -nebulizer with albuterol up to QID (0.83)  - continue Symbicort 160 2 inhalations BID -add Spiriva at 2 inhalations QAM (after Symbicort ) - follow up with pulmonary rehab  -COPD is a progressive disease and although it can't be cured , appropriate management can slow its progression, reduce frequency and severity of exacerbations, and improve symptoms and the patient quality of life. Hospitalizations are the greatest contributor to the total COPD costs and account for up to 87% of total COPD related costs. Exacerbations are the main cause of admissions and subsequently account for the 40-75% of COPD costs. Inhaled maintenance therapy reduces the incidence of exacerbations in patients with stable COPD. Incorrect inhaler use and nonadherence are major obstacles to achieving COPD treatment goals. Many COPD patients have challenges (impaired inhalation, limited dexterity, reduced cognition: that limit their ability to correctly use their COPD treatment devices resulting in reduced symptom control. Of most importance is smoking cessation and early intervention with respiratory illnesses and contemplation for pulmonary rehab to enhance quality of life.  -Information sheet given to the patient to be reviewed, this medication is never to be used without consulting the prescribing physician. Proper dietary restraint is necessary specifically salt containing foods, if any reaction may occur should be reported.   Problem 1A: ?RT pneumonitis -CT schedule 4/6/2023    -if (+), consider prednisone Rx  Problem 1B: Cardiac  -Dub[place]sky  F/p -Coronary CT yearly  Problem 1C : COVID-19 infection  /2023 -add Paxlovid 5 day course -recommended 10 day quarantine -recommended Tayla Reyes Cold and Flu  Problem 1D: Acute Sinusitis - resolved 1/2023 -s/p Augmentin 875 mg BID     Problem 2: Obesity (improved) -Weight loss, exercise, and diet control were discussed and are highly encouraged. Treatment options were given such as, aqua therapy, and contacting a nutritionist. Recommended to use the elliptical, stationary bike, less use of treadmill. Obesity is associated with worsening asthma, shortness of breath, and potential for cardiac disease, diabetes, and other underlying medical conditions.   Problem 3: allergies/sinuses - continue Astelin 0.15% 1 sniff/nostril BID - continue Flonase 1 spray each nostril BID - Off Xyzal 5 mg QHS due to prostate issues.  -Environmental measures for allergies were encouraged including mattress and pillow cover, air purifier, and environmental controls.   Problem 4: GERD - continue Omeprazole 40 mg BID (pre-breakfast/pre-dinner)  -continue Pepcid 40mg QHS  -Rule of 2s: avoid eating too much, eating too late, eating too spicy, eating two hours before bed -Things to avoid including overeating, spicy foods, tight clothing, eating within three hours of bed, this list is not all inclusive.  -For treatment of reflux, possible options discussed including diet control, H2 blockers, PPIs, as well as coating motility agents discussed as treatment options. Timing of meals and proximity of last meal to sleep were discussed. If symptoms persist, a formal gastrointestinal evaluation is needed.   Problem 5: sleep apnea, - side sleeping (positional change) -has refused treatment ; chinstrap -Sleep apnea is associated with adverse clinical consequences which an affect most organ systems. Cardiovascular disease risk includes arrhythmias, atrial fibrillation, hypertension, coronary artery disease, and stroke. Metabolic disorders include diabetes type 2, non-alcoholic fatty liver disease. Mood disorder especially depression; and cognitive decline especially in the elderly. Associations with chronic reflux/Zamudio's esophagus some but not all inclusive.  -Reasons to assess this include arousal consistent with hypopnea; respiratory events most prominent in REM sleep or supine position; therefore sleep staging and body position are important for accurate diagnosis and estimation of AHI.   problem 6: lung cancer screening -recommended follow up chest CT yearly, s/p CT 7/18/2021 - 7/2023 - next 7/2024  -Recommend CTS evaluation with Dr. Domingo / [facility] - Lung cancer screening is recommended for people between the ages of 55 and 80 with prior 30+ pack year smoking histories. There is irrefutable evidence for realization of lung cancer screening based on two large randomized control trials demonstrating relative reduction in lung cancer mortality for patients undergoing low-dose CT scanning. Risks and benefits reviewed with the patient   Problem 7a: COVID-19 precautionary Immune Support Recommendations: -Covid-19 vaccine x3  -OTC Vitamin C 500mg BID  -OTC Quercetin 250-500mg BID  -OTC Zinc 75-100mg per day  -OTC Melatonin 1 or 2mg a night  -OTC Vitamin D 1-4000mg per day  -OTC Tonic Water 8oz per day -Water 0.5-1 gallon per day  problem 7: health maintenance  -recommended to go to pulmonary rehab -he is has Xanax 0.5 mg PRN for anxiety -received influenza vaccination 2019 -recommended strep pneumonia vaccines: Prevnar-13 vaccine, followed by Pneumo vaccine 23 one year following -recommended early intervention for URIs -recommended regular osteoporosis evaluations -recommended early dermatological evaluations -recommended after the age of 50 to the age of 70, colonoscopy every 5 years     F/U in 4-6 months He is encouraged to call with any questions, concerns, and changes.

## 2023-08-21 NOTE — HISTORY OF PRESENT ILLNESS
[FreeTextEntry1] : Mr. Caro is a 68 year old male presenting to the office  for a follow up visit for abnormal PFTs, allergic rhinitis, adenocarcinoma of the lung, COPD, GERD, LILY and shortness of breath. His chief complaint is  - he notes feeling generally well - he notes feeling slightly more fatigued than usual - he notes producing some phlegm - vision is stable - he notes his weight is stable - he notes not exercising too much  - he notes bowels are regular - he notes he is getting a biopsy with Dr. Barry Goldberg for her his prostate  - he notes an MRI of his prostate showed there shouldn't be a need for any extensive treatment - he notes going to the ER when he saw his urine was discolored with a slight orange color - he notes his urine has been normal since then  - he notes going back to [place] come October  - he notes Dr. Scott was happy with his CT -he denies any headaches, nausea, emesis, fever, chills, sweats, chest pain, chest pressure, coughing, wheezing, palpitations, constipation, diarrhea, vertigo, dysphagia, heartburn, reflux, itchy eyes, itchy ears, leg swelling, leg pain, arthralgias, myalgias, or sour taste in the mouth.

## 2023-08-21 NOTE — ADDENDUM
[FreeTextEntry1] : Documented by Anders Briscoe acting as a scribe for Dr. Cliff Marvin on 08/21/2023.   All medical record entries made by the Scribe were at my, Dr. Cliff Marvin's, direction and personally dictated by me on 08/21/2023. I have reviewed the chart and agree that the record accurately reflects my personal performance of the history, physical exam, assessment and plan. I have also personally directed, reviewed, and agree with the discharge instructions.

## 2023-08-21 NOTE — PROCEDURE
[FreeTextEntry1] : Chest CT (8.3.2023) revealed since 4/6/2023 there were overall no significant changes in superior left lower lob consolidation with decrease in aeration, likely post radiation changes.   PFT reveals moderate to severe restrictive dysfunction, with an FEV1 of 1.60 L, which is 39.5% of predicted, with a normal flow volume loop. PFTs were performed to evaluate for   FENO was 22; a normal value being less than 25 Fractional exhaled nitric oxide (FENO) is regarded as a simple, noninvasive method for assessing eosinophilic airway inflammation. Produced by a variety of cells within the lung, nitric oxide (NO) concentrations are generally low in healthy individuals. However, high concentrations of NO appear to be involved in nonspecific host defense mechanisms and chronic inflammatory diseases such as asthma. The American Thoracic Society (ATS) therefore has recommended using FENO to aid in the diagnosis and monitoring of eosinophilic airway inflammation and asthma, and for identifying steroid responsive individuals whose chronic respiratory symptoms may be caused by airway inflammation.

## 2023-09-18 ENCOUNTER — TRANSCRIPTION ENCOUNTER (OUTPATIENT)
Age: 68
End: 2023-09-18

## 2023-09-18 DIAGNOSIS — R05.8 OTHER SPECIFIED COUGH: ICD-10-CM

## 2023-09-18 RX ORDER — AZITHROMYCIN 500 MG/1
500 TABLET, FILM COATED ORAL DAILY
Qty: 7 | Refills: 0 | Status: ACTIVE | COMMUNITY
Start: 2023-09-18 | End: 1900-01-01

## 2023-09-25 ENCOUNTER — TRANSCRIPTION ENCOUNTER (OUTPATIENT)
Age: 68
End: 2023-09-25

## 2023-09-27 ENCOUNTER — TRANSCRIPTION ENCOUNTER (OUTPATIENT)
Age: 68
End: 2023-09-27

## 2023-09-28 ENCOUNTER — TRANSCRIPTION ENCOUNTER (OUTPATIENT)
Age: 68
End: 2023-09-28

## 2023-10-02 ENCOUNTER — TRANSCRIPTION ENCOUNTER (OUTPATIENT)
Age: 68
End: 2023-10-02

## 2023-10-10 ENCOUNTER — TRANSCRIPTION ENCOUNTER (OUTPATIENT)
Age: 68
End: 2023-10-10

## 2023-10-24 ENCOUNTER — TRANSCRIPTION ENCOUNTER (OUTPATIENT)
Age: 68
End: 2023-10-24

## 2023-11-27 ENCOUNTER — TRANSCRIPTION ENCOUNTER (OUTPATIENT)
Age: 68
End: 2023-11-27

## 2023-12-18 ENCOUNTER — APPOINTMENT (OUTPATIENT)
Dept: DERMATOLOGY | Facility: CLINIC | Age: 68
End: 2023-12-18
Payer: MEDICARE

## 2023-12-18 PROCEDURE — 99213 OFFICE O/P EST LOW 20 MIN: CPT

## 2024-01-25 ENCOUNTER — TRANSCRIPTION ENCOUNTER (OUTPATIENT)
Age: 69
End: 2024-01-25

## 2024-02-07 ENCOUNTER — RX RENEWAL (OUTPATIENT)
Age: 69
End: 2024-02-07

## 2024-02-14 ENCOUNTER — APPOINTMENT (OUTPATIENT)
Dept: PULMONOLOGY | Facility: CLINIC | Age: 69
End: 2024-02-14
Payer: MEDICARE

## 2024-02-14 VITALS
RESPIRATION RATE: 18 BRPM | WEIGHT: 208.19 LBS | TEMPERATURE: 97.3 F | SYSTOLIC BLOOD PRESSURE: 122 MMHG | HEIGHT: 76 IN | BODY MASS INDEX: 25.35 KG/M2 | OXYGEN SATURATION: 97 % | HEART RATE: 70 BPM | DIASTOLIC BLOOD PRESSURE: 80 MMHG

## 2024-02-14 DIAGNOSIS — C34.90 MALIGNANT NEOPLASM OF UNSPECIFIED PART OF UNSPECIFIED BRONCHUS OR LUNG: ICD-10-CM

## 2024-02-14 DIAGNOSIS — E66.3 OVERWEIGHT: ICD-10-CM

## 2024-02-14 PROCEDURE — 94727 GAS DIL/WSHOT DETER LNG VOL: CPT

## 2024-02-14 PROCEDURE — 99214 OFFICE O/P EST MOD 30 MIN: CPT | Mod: 25

## 2024-02-14 PROCEDURE — 94729 DIFFUSING CAPACITY: CPT

## 2024-02-14 PROCEDURE — 94010 BREATHING CAPACITY TEST: CPT

## 2024-02-14 PROCEDURE — 95012 NITRIC OXIDE EXP GAS DETER: CPT

## 2024-02-14 RX ORDER — UMECLIDINIUM 62.5 UG/1
62.5 AEROSOL, POWDER ORAL
Qty: 3 | Refills: 1 | Status: ACTIVE | COMMUNITY
Start: 2024-02-14 | End: 1900-01-01

## 2024-02-14 NOTE — ADDENDUM
[FreeTextEntry1] : Documented by Anders Briscoe acting as a scribe for Dr. Cliff Marvin on 02/14/2024.   All medical record entries made by the Scribe were at my, Dr. Cliff Marvin's, direction and personally dictated by me on 02/14/2024. I have reviewed the chart and agree that the record accurately reflects my personal performance of the history, physical exam, assessment and plan. I have also personally directed, reviewed, and agree with the discharge instructions.

## 2024-02-14 NOTE — HISTORY OF PRESENT ILLNESS
[FreeTextEntry1] : Mr. Caro is a 68 year old male presenting to the office  for a follow up visit for abnormal PFTs, allergic rhinitis, adenocarcinoma of the lung, COPD, GERD, LILY and shortness of breath. His chief complaint is  - he notes frequent fatigue and getting SOB easily - he notes recent travel to [place] - he notes recently having some lower back pain  - he notes losing about 5 pounds - he notes a few weeks ago having a stomach issue causing some nausea which he thinks he got from his grandchildren - he notes wanting to get back into exercise on his bike - he notes hoarseness which has been present for a long time - he notes [place] - he notes using his Symbicort and Incruse - he notes planned travel to [place] next week and plans to return in April   -he denies any headaches, nausea, emesis, fever, chills, sweats, chest pain, chest pressure, coughing, wheezing, palpitations, constipation, diarrhea, vertigo, dysphagia, heartburn, reflux, itchy eyes, itchy ears, leg swelling, leg pain, arthralgias, myalgias, hoarseness, or sour taste in the mouth. Airway patent, Nasal mucosa clear. Mouth with normal mucosa. Throat has no vesicles, no oropharyngeal exudates and uvula is midline.

## 2024-02-14 NOTE — PHYSICAL EXAM
Assessment/Plan





ASSESSMENT:


sp code blue 8/26


Septic Shock; SP


Fever, recurrent- low grade 


Leukocytosis; persistent/fluctuating, SP


     -9/19 Bcx NTD


   -9/9 u/a no pyuria


   -9/8 Bcx NTD (Picc line)


   -9/6 Bcx NTD


            ucx Neg


             sp cx C. parapsilopsis


  -8/26 u/a no pyuria





Pneumonia.- COVID 19 neg x3


   Acute hypoxic resp failure on VM> NRB 15l 100%; hypoxic on ABG> now VDRF 

8/26- Fio2 80% >100% 8/28> 60% 9/1 >80% 9/2   >95% 9/10 >90% 9/14 >60% 9/15


R tension Pneumothroax sp CT 9/21


       -9/22 CXR: Interim complete reexpansion of right lung without evidence of

residual pneumothorax. Bilateral diffuse and extensive infiltrates. Markedly 

improved chest wall subcutaneous emphysema


       -9/21 CXR: Interim reexpansion of previously demonstrated right 

pneumothorax, status post large bore chest tube placement.


      -9/14 CXR: Improved aeration of both lungs.


       -9/5 CXR:Small bilateral pleural effusions with minor edema.  Stable 

edema with  mild worsening in the degree of pleural effusion on the right.


         -9/1 sp cx Neg


         8/31 CXR: Extensive bilateral interstitial and airspace disease appears

similar to the prior exam. Moderate to large bilateral pleural effusions appear 

unchanged.


   8/28 CXR: Increasing left upper lobe dense consolidation and likely 

increasing bilateral pleural fluid. Persistent diffuse dense consolidation el

sewhere


            -8/26 sp cx normal resp lilliam


             -8/25 CXR: Increased atelectasis of the right lung, since prior e

xam of 3 days earlier. New or increased right pleural effusion. Increased left 

basilar consolidation and/or pleural fluid 


          -COVID Rapid PCR neg 8/23, 8/23, 8/26


          -8/22 spc x Group G strep


          -8/22 CXR:   Reduced lung volumes.  Patchy bilateral predominantly 

interstitial  pulmonary opacities.  Could be from edema and/or pneumonia.  There

is a broader differential.


 


        -legionella ag urine, blasto ab, Histo ab, HIV ab screen, JOY, ANCA neg





Persistent, high grade bacteremia-


     -8/22 Bcx 4/4 sets S. haemolyticus; 8/23 Bcx 3/4 S/ epi; 8/27 Bcx 1.4 S. 

warnerri; 8/29 Bcx Neg


     -2d echo: no vegetaions seen





          ua/ wbc 10-15, nit neg, leuk +1; ucx Neg





DAVID; 


 -supratherapeutic vanco levels





-Seizure disorder.


- Hypothyroidism.


- Down syndrome.





History of PEG tube placement.


[facility] resident








PLAN:


Monitor off abx





          9/14 SP Meropenem #18, IV AMikacin #7


          9/4/20 SP Daptomycin #11


          9/2 SP MIcafungin #7, Linezolid #5


   8/28 SP Azithromycin #7/7


   8/26 SP Ceftriaxone #2


   8/25 SP IV Vancomycin #4, Zosyn #4


   8/22 SP Cefepime x1, Flagyl x1





- Monitor CBC, BMP.


.f/u cx


- COVID neg x3


- Monitor chest x-ray.


- Monitor the patient's clinical course and labs.  Based on those, we will do 

further recommendation.


-f/u Fungitell, asp ag, flow cytometry


-poor prognosis











Thank you, Dr. Cherry, for allowing me to participate in the care of


this patient.  I will follow the patient with you at this


hospitalization.








Discussed with RN





Subjective


Allergies:  


Coded Allergies:  


     No Known Allergies (Unverified , 1/8/19)





Afebrile


On Vent 70% O2 


No leukocytosis





Objective





Last 24 Hour Vital Signs








  Date Time  Temp Pulse Resp B/P (MAP) Pulse Ox O2 Delivery O2 Flow Rate FiO2


 


9/28/20 11:00  51 20 128/85 (99) 97   


 


9/28/20 10:30  49 24     70


 


9/28/20 10:00  48 22 138/77 (97) 96   


 


9/28/20 09:00  50 24 121/87 (98) 94   


 


9/28/20 08:03  51      


 


9/28/20 08:00      Mechanical Ventilator  





      Mechanical Ventilator  





      Mechanical Ventilator  


 


9/28/20 08:00 96.5 47 26 109/78 (88) 97   


 


9/28/20 08:00        70


 


9/28/20 07:33  52 24     70


 


9/28/20 07:00  48 23 94/74 (81) 97   


 


9/28/20 06:00  48 30 120/76 (91) 98   


 


9/28/20 05:00  50 24 106/74 (85) 96   


 


9/28/20 05:00  50 24 106/74 (85) 96   


 


9/28/20 04:00  56      


 


9/28/20 04:00 98.0 56 31 110/86 (94) 94   


 


9/28/20 04:00        70


 


9/28/20 04:00      Mechanical Ventilator  





      Mechanical Ventilator  





      Mechanical Ventilator  


 


9/28/20 04:00  56 31 110/86 (94) 94   


 


9/28/20 03:30  55 24     70


 


9/28/20 03:00  48 29 112/61 (78) 97   


 


9/28/20 03:00  48 29 112/61 (78) 97   


 


9/28/20 02:00  47 23 123/67 (85) 97   


 


9/28/20 02:00  50 24 123/67 (85) 97   


 


9/28/20 01:00  46 23 132/64 (86) 96   


 


9/28/20 01:00  46 23 132/64 (86) 96   


 


9/28/20 00:00      Mechanical Ventilator  





      Mechanical Ventilator  





      Mechanical Ventilator  


 


9/28/20 00:00  41 18 124/67 (86) 95   


 


9/28/20 00:00        70


 


9/28/20 00:00  40      


 


9/28/20 00:00 97.8 41 18 124/67 (86) 95   


 


9/27/20 23:00  49 25 121/58 (79) 95   


 


9/27/20 22:58  48 25     70


 


9/27/20 22:00  41 24 89/55 (66) 96   


 


9/27/20 21:00  43 30 123/90 (101) 98   


 


9/27/20 20:00 98.5 50 21 147/68 (94) 95   


 


9/27/20 20:00      Mechanical Ventilator  





      Mechanical Ventilator  





      Mechanical Ventilator  


 


9/27/20 20:00        70


 


9/27/20 20:00  47      


 


9/27/20 19:08  47 24     70


 


9/27/20 19:00  50 18 105/64 (78) 95   


 


9/27/20 18:00  47 18 105/64 (78) 95   


 


9/27/20 17:00 98.3 45 18 144/68 (93) 94   


 


9/27/20 16:00  45      


 


9/27/20 16:00  45 22 137/68 (91) 95   


 


9/27/20 16:00      Mechanical Ventilator  





      Mechanical Ventilator  





      Mechanical Ventilator  


 


9/27/20 16:00        70


 


9/27/20 15:05  47 24     60


 


9/27/20 15:00  41 15 153/82 (105) 95   


 


9/27/20 14:00  46 23 146/79 (101) 97   


 


9/27/20 13:00  53 16 147/95 (112) 94   


 


9/27/20 12:00        70


 


9/27/20 12:00      Mechanical Ventilator  





      Mechanical Ventilator  





      Mechanical Ventilator  


 


9/27/20 12:00  50 21 132/75 (94) 96   


 


9/27/20 12:00 98.5       


 


9/27/20 12:00  49      








Height (Feet):  5


Height (Inches):  3.00


Weight (Pounds):  172


GEN: NAD on Vent 70% O2


HEENT: NCAT, Intubated, 


Pulm: Equal chest rise and fall B/L, No accessory muscle use


ABD: Soft, ND


SKIN: Exposed skin with no rash, Normal in color





Laboratory Tests








Test


 9/27/20


12:58 9/27/20


16:46 9/27/20


20:38 9/28/20


04:00


 


POC Whole Blood Glucose


 167 MG/DL


()  H 151 MG/DL


()  H Pending  


 





 


White Blood Count


 


 


 


 6.7 K/UL


(4.8-10.8)


 


Red Blood Count


 


 


 


 2.95 M/UL


(4.20-5.40)  L


 


Hemoglobin


 


 


 


 9.5 G/DL


(12.0-16.0)  L


 


Hematocrit


 


 


 


 27.6 %


(37.0-47.0)  L


 


Mean Corpuscular Volume    93 FL (80-99)  


 


Mean Corpuscular Hemoglobin


 


 


 


 32.2 PG


(27.0-31.0)  H


 


Mean Corpuscular Hemoglobin


Concent 


 


 


 34.4 G/DL


(32.0-36.0)


 


Red Cell Distribution Width


 


 


 


 16.3 %


(11.6-14.8)  H


 


Platelet Count


 


 


 


 115 K/UL


(150-450)  L


 


Mean Platelet Volume


 


 


 


 9.5 FL


(6.5-10.1)


 


Neutrophils (%) (Auto)


 


 


 


 % (45.0-75.0)





 


Lymphocytes (%) (Auto)


 


 


 


 % (20.0-45.0)





 


Monocytes (%) (Auto)     % (1.0-10.0)  


 


Eosinophils (%) (Auto)     % (0.0-3.0)  


 


Basophils (%) (Auto)     % (0.0-2.0)  


 


Sodium Level


 


 


 


 148 MMOL/L


(136-145)  H


 


Potassium Level


 


 


 


 3.5 MMOL/L


(3.5-5.1)


 


Chloride Level


 


 


 


 113 MMOL/L


()  H


 


Carbon Dioxide Level


 


 


 


 30 MMOL/L


(21-32)


 


Anion Gap


 


 


 


 6 mmol/L


(5-15)


 


Blood Urea Nitrogen


 


 


 


 23 mg/dL


(7-18)  H


 


Creatinine


 


 


 


 0.6 MG/DL


(0.55-1.30)


 


Estimat Glomerular Filtration


Rate 


 


 


 > 60 mL/min


(>60)


 


Glucose Level


 


 


 


 194 MG/DL


()  H


 


Calcium Level


 


 


 


 7.3 MG/DL


(8.5-10.1)  L


 


Test


 9/28/20


05:34 9/28/20


07:43 9/28/20


09:00 





 


POC Whole Blood Glucose


 177 MG/DL


()  H 


 


 





 


Arterial Blood pH


 


 7.524


(7.350-7.450) 


 





 


Arterial Blood Partial


Pressure CO2 


 34.3 mmHg


(35.0-45.0)  L 


 





 


Arterial Blood Partial


Pressure O2 


 87.2 mmHg


(75.0-100.0) 


 





 


Arterial Blood HCO3


 


 27.6 mmol/L


(22.0-26.0)  H 


 





 


Arterial Blood Oxygen


Saturation 


 96.1 %


() 


 





 


Arterial Blood Base Excess  4.8 (-2-2)  H  


 


Cole Test  Positive    


 


Phosphorus Level


 


 


 2.7 MG/DL


(2.5-4.9) 





 


Magnesium Level


 


 


 2.0 MG/DL


(1.8-2.4) 





 


Total Bilirubin


 


 


 0.6 MG/DL


(0.2-1.0) 





 


Direct Bilirubin


 


 


 0.2 MG/DL


(0.0-0.3) 





 


Aspartate Amino Transf


(AST/SGOT) 


 


 20 U/L (15-37)


 





 


Alanine Aminotransferase


(ALT/SGPT) 


 


 41 U/L (12-78)


 





 


Alkaline Phosphatase


 


 


 53 U/L


() 





 


Total Protein


 


 


 4.4 G/DL


(6.4-8.2)  L 





 


Albumin


 


 


 1.3 G/DL


(3.4-5.0)  L 














Current Medications








 Medications


  (Trade)  Dose


 Ordered  Sig/Didi


 Route


 PRN Reason  Start Time


 Stop Time Status Last Admin


Dose Admin


 


 Acetaminophen


  (Tylenol)  650 mg  Q4H  PRN


 GT


 For Pain  9/23/20 17:15


 10/23/20 17:14  9/23/20 17:23





 


 Chlorhexidine


 Gluconate


  (Beatrice-Hex 2%)  1 applic  DAILY@2000


 TOPIC


   8/31/20 20:00


 11/29/20 19:59  9/27/20 20:42





 


 Clotrimazole


  (Lotrimin)  1 applic  Q12HR


 TOPIC


   8/30/20 13:00


 11/28/20 12:59  9/28/20 08:38





 


 Dextrose


  (Dextrose 50%)  25 ml  Q30M  PRN


 IV


 Hypoglycemia  8/26/20 11:30


 11/20/20 11:29   





 


 Dextrose


  (Dextrose 50%)  50 ml  Q30M  PRN


 IV


 Hypoglycemia  8/26/20 11:30


 11/20/20 11:29   





 


 Hydrocortisone


  (Solu-CORTEF)  50 mg  EVERY 8  HOURS


 IV


   9/28/20 06:00


 12/27/20 05:59  9/28/20 06:08





 


 Insulin Aspart


  (NovoLOG)    Q6HR


 SUBQ


   9/18/20 12:00


 12/17/20 11:59  9/28/20 05:43





 


 Insulin Detemir


  (Levemir)  10 units  Q12HR


 SUBQ


   9/18/20 10:00


 12/17/20 09:59  9/28/20 08:40





 


 Loperamide HCl


  (Imodium)  2 mg  Q6H  PRN


 NG


 Diarrhea  9/16/20 10:30


 10/16/20 10:29  9/23/20 11:41





 


 Potassium Chloride


  (K-Dur)  40 meq  EVERY 12  HOURS


 GT


   9/26/20 21:00


 12/19/20 12:14  9/28/20 08:39




















Elfego Mcmahon MD            Sep 28, 2020 11:57 [No Acute Distress] : no acute distress [Well Nourished] : well nourished [Well Groomed] : well groomed [No Deformities] : no deformities [Well Developed] : well developed [Normal Oropharynx] : normal oropharynx [II] : Mallampati Class: II [Normal Appearance] : normal appearance [No Neck Mass] : no neck mass [Normal Rate/Rhythm] : normal rate/rhythm [Normal S1, S2] : normal s1, s2 [No Murmurs] : no murmurs [No Resp Distress] : no resp distress [Clear to Auscultation Bilaterally] : clear to auscultation bilaterally [No Abnormalities] : no abnormalities [Benign] : benign [Normal Gait] : normal gait [No Clubbing] : no clubbing [No Cyanosis] : no cyanosis [No Edema] : no edema [FROM] : FROM [Normal Color/ Pigmentation] : normal color/ pigmentation [No Focal Deficits] : no focal deficits [Oriented x3] : oriented x3 [Normal Affect] : normal affect [TextBox_68] : I:E ratio 1:3; clear

## 2024-02-14 NOTE — PROCEDURE
[FreeTextEntry1] : CT Chest (1.22.2024) revealed no significant change since August 3, 2023. Stable bilateral upper lobectomies. 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 Stable 6 mm ground glass nodule and 2 mm solid nodule in the right upper lobe. No new, enlarging, or suspicious pulmonary nodules. No thoracic lymphadenopathy. Mild emphysema.   Full PFT reveals moderate restrictive and obstructive dysfunction; FEV1 was 1.85 L which is 45% of predicted; moderately reduced lung volumes; mildly reduced diffusion at 16.5, which is 67% of predicted; normal flow volume loop. PFTs were performed to evaluate for SOB  FENO was 30; a normal value being less than 25 Fractional exhaled nitric oxide (FENO) is regarded as a simple, noninvasive method for assessing eosinophilic airway inflammation. Produced by a variety of cells within the lung, nitric oxide (NO) concentrations are generally low in healthy individuals. However, high concentrations of NO appear to be involved in nonspecific host defense mechanisms and chronic inflammatory diseases such as asthma. The American Thoracic Society (ATS) therefore has strongly recommended using FENO to aid in the assessment, management, and long-term monitoring of eosinophilic airway inflammation and asthma, and for identifying steroid responsive individuals whose chronic respiratory symptoms may be caused by airway inflammation. In their 2011 clinical practice guideline, the ATS emphasizes the importance of using FENO.

## 2024-02-14 NOTE — ASSESSMENT
[FreeTextEntry1] : Mr. Caro is a 68 year old Male who has a history of BPH, hypothyroid, COPD, multiple lung cancers, OSAS, allergy, and GERD. He presents to the office today. He is doing well from a pulmonary perspective. His number one complaint is Abnormal CTL IMELDA 1 cm nodule - suture line: DDx CA/ S/p CRS evaluation c/w colonic polyp- stable- exception [place] - ?RT pneumonitis, s/p acute sinusitis and COPD exacerbation 1/31/2023 - Stable except [place]   His shortness of breath is multifactorial due to - history of COPD from prior smoking - restrictive disfunction due to multiple lung surgeries  - obesity/out of shape  - vocal cords - poor mechanics of breathing  Problem 1: COPD (quiet) -s/p Prednisone 20mg for 7 days then 10mg for 7 days 1/2023  -nebulizer with albuterol up to QID (0.83)  - continue Symbicort 160 2 inhalations BID -add Spiriva at 2 inhalations QAM (after Symbicort ) or Incruse Ellipta 1 puff QD - follow up with pulmonary rehab  -COPD is a progressive disease and although it can't be cured , appropriate management can slow its progression, reduce frequency and severity of exacerbations, and improve symptoms and the patient quality of life. Hospitalizations are the greatest contributor to the total COPD costs and account for up to 87% of total COPD related costs. Exacerbations are the main cause of admissions and subsequently account for the 40-75% of COPD costs. Inhaled maintenance therapy reduces the incidence of exacerbations in patients with stable COPD. Incorrect inhaler use and nonadherence are major obstacles to achieving COPD treatment goals. Many COPD patients have challenges (impaired inhalation, limited dexterity, reduced cognition: that limit their ability to correctly use their COPD treatment devices resulting in reduced symptom control. Of most importance is smoking cessation and early intervention with respiratory illnesses and contemplation for pulmonary rehab to enhance quality of life.  -Information sheet given to the patient to be reviewed, this medication is never to be used without consulting the prescribing physician. Proper dietary restraint is necessary specifically salt containing foods, if any reaction may occur should be reported.   Problem 1A: ?RT pneumonitis -CT schedule 4/6/2023    -if (+), consider prednisone Rx  Problem 1B: Cardiac  -[place]  F/p -Coronary CT yearly  Problem 1C : COVID-19 infection  /2023 -add Paxlovid 5 day course -recommended 10 day quarantine -recommended Tayla Reyes Cold and Flu  Problem 1D: Acute Sinusitis - resolved 1/2023 -s/p Augmentin 875 mg BID     Problem 2: Obesity (improved) -Weight loss, exercise, and diet control were discussed and are highly encouraged. Treatment options were given such as, aqua therapy, and contacting a nutritionist. Recommended to use the elliptical, stationary bike, less use of treadmill. Obesity is associated with worsening asthma, shortness of breath, and potential for cardiac disease, diabetes, and other underlying medical conditions.   Problem 3: allergies/sinuses - continue Astelin 0.15% 1 sniff/nostril BID - continue Flonase 1 spray each nostril BID - Off Xyzal 5 mg QHS due to prostate issues.  -Environmental measures for allergies were encouraged including mattress and pillow cover, air purifier, and environmental controls.   Problem 4: GERD - continue Omeprazole 40 mg BID (pre-breakfast/pre-dinner)  -continue Pepcid 40mg QHS  -Rule of 2s: avoid eating too much, eating too late, eating too spicy, eating two hours before bed -Things to avoid including overeating, spicy foods, tight clothing, eating within three hours of bed, this list is not all inclusive.  -For treatment of reflux, possible options discussed including diet control, H2 blockers, PPIs, as well as coating motility agents discussed as treatment options. Timing of meals and proximity of last meal to sleep were discussed. If symptoms persist, a formal gastrointestinal evaluation is needed.   Problem 5: sleep apnea, - side sleeping (positional change) -has refused treatment ; chinstrap -Sleep apnea is associated with adverse clinical consequences which an affect most organ systems. Cardiovascular disease risk includes arrhythmias, atrial fibrillation, hypertension, coronary artery disease, and stroke. Metabolic disorders include diabetes type 2, non-alcoholic fatty liver disease. Mood disorder especially depression; and cognitive decline especially in the elderly. Associations with chronic reflux/Zamudio's esophagus some but not all inclusive.  -Reasons to assess this include arousal consistent with hypopnea; respiratory events most prominent in REM sleep or supine position; therefore sleep staging and body position are important for accurate diagnosis and estimation of AHI.   problem 6: lung cancer screening -recommended follow up chest CT yearly, s/p CT 7/18/2021 - 7/2023 - 1/2024 - next 7/2024 -Recommend CTS evaluation with Dr. Domingo / [facility] - Lung cancer screening is recommended for people between the ages of 55 and 80 with prior 30+ pack year smoking histories. There is irrefutable evidence for realization of lung cancer screening based on two large randomized control trials demonstrating relative reduction in lung cancer mortality for patients undergoing low-dose CT scanning. Risks and benefits reviewed with the patient   Problem 7a: COVID-19 precautionary Immune Support Recommendations: -Covid-19 vaccine x3  -OTC Vitamin C 500mg BID  -OTC Quercetin 250-500mg BID  -OTC Zinc 75-100mg per day  -OTC Melatonin 1 or 2mg a night  -OTC Vitamin D 1-4000mg per day  -OTC Tonic Water 8oz per day -Water 0.5-1 gallon per day  problem 7: health maintenance  -recommended to go to pulmonary rehab -he is has Xanax 0.5 mg PRN for anxiety -received influenza vaccination 2023 -recommended strep pneumonia vaccines: Prevnar-13 vaccine, followed by Pneumo vaccine 23 one year following -recommended early intervention for URIs -recommended regular osteoporosis evaluations -recommended early dermatological evaluations -recommended after the age of 50 to the age of 70, colonoscopy every 5 years    F/U in 4-6 months He is encouraged to call with any questions, concerns, and changes.

## 2024-05-14 NOTE — CONSULT NOTE ADULT - I WAS PHYSICALLY PRESENT FOR THE KEY PORTIONS OF THE EVALUATION AND MANAGEMENT (E/M) SERVICE PROVIDED.  I AGREE WITH THE ABOVE HISTORY, PHYSICAL, AND PLAN WHICH I HAVE REVIEWED AND EDITED WHERE APPROPRIATE
"Bariatric Nutrition Assessment Note    Type of surgery    Preop (6 months of wt checks)  Surgery Date: TBD--leaning toward sleeve  Surgeon: Dr Pires (consult was 4/19/24)    Nutrition Assessment   James Marrero  25 y.o.  male     Wt with BMI of 25: 155.6#  Pre-Op Excess Wt: 167#  Weight:  322#  Height: 5' 6\"  BMI: Body mass index is 51.97 kg/m².    Jolly Cannon Equation:      Estimated calories for weight loss 6810-7116 ( 1-2# per wk wt loss - sedentary )  Estimated protein needs #(1.0-1.5 gms/kg IBW )   Estimated fluid needs 2100-2450ml (30-35 ml/kg IBW )      Weight History   Onset of Obesity: Adult, started when taking the psych meds  Family history of obesity: unknown  Wt Loss Attempts: Exercise  Self Created Diets (Portion Control, Healthy Food Choices, etc.)  Patient has tried the above for 6 months or more with insufficient weight loss or weight regain, which is why patient has requested to be evaluated for weight loss surgery today  Maximum Wt Lost: only lost when was in treatment where they controlled the portions.    Review of History and Medications   Past Medical History:   Diagnosis Date    Anxiety     Bronchitis     Concussion     Sports related    Depression     Hypertension     Morbid obesity with BMI of 45.0-49.9, adult (HCC)     Sleep apnea, obstructive      Past Surgical History:   Procedure Laterality Date    NO PAST SURGERIES       Social History     Socioeconomic History    Marital status: Single     Spouse name: Not on file    Number of children: Not on file    Years of education: Not on file    Highest education level: Not on file   Occupational History    Occupation: student    Occupation:      Employer: [employer]   Tobacco Use    Smoking status: Never    Smokeless tobacco: Never   Vaping Use    Vaping status: Never Used   Substance and Sexual Activity    Alcohol use: Yes     Alcohol/week: 1.0 standard drink of alcohol     Types: 1 Cans of beer per week     " Comment: social    Drug use: No    Sexual activity: Not on file   Other Topics Concern    Not on file   Social History Narrative    Lives with adoptive parents     Social Determinants of Health     Financial Resource Strain: Not on file   Food Insecurity: Not on file   Transportation Needs: Not on file   Physical Activity: Not on file   Stress: Not on file   Social Connections: Not on file   Intimate Partner Violence: Not on file   Housing Stability: Not on file       Current Outpatient Medications:     buPROPion (WELLBUTRIN XL) 300 mg 24 hr tablet, Take 1 tablet (300 mg total) by mouth every morning, Disp: 90 tablet, Rfl: 0    desvenlafaxine succinate (PRISTIQ) 50 mg 24 hr tablet, TAKE 1 TABLET BY MOUTH EVERY DAY, Disp: 90 tablet, Rfl: 0    doxepin (SINEquan) 100 mg capsule, Take 1 capsule (100 mg total) by mouth 3 (three) times daily after meals, Disp: 270 capsule, Rfl: 1    losartan (COZAAR) 50 mg tablet, Take 1 tablet (50 mg total) by mouth daily, Disp: 90 tablet, Rfl: 3    mupirocin (BACTROBAN) 2 % ointment, Apply topically 3 (three) times a day (Patient taking differently: Apply topically if needed), Disp: 30 g, Rfl: 0    Omeprazole 20 MG TBEC, Take 1 tablet (20 mg total) by mouth 2 (two) times a day, Disp: 180 tablet, Rfl: 1    propranolol (INDERAL LA) 80 mg 24 hr capsule, TAKE 1 CAPSULE (80 MG TOTAL) BY MOUTH EVERY MORNING, Disp: 90 capsule, Rfl: 3    Semaglutide-Weight Management (Wegovy) 2.4 MG/0.75ML, Inject 0.75 mL (2.4 mg total) under the skin once a week, Disp: 3 mL, Rfl: 5    Sodium Fluoride 5000 PPM 1.1 % GEL, BRUSH ON NIGHTLY AND SPIT OUT EXCESS, Disp: , Rfl:     topiramate (TOPAMAX) 200 MG tablet, TAKE 1 TABLET (200 MG TOTAL) BY MOUTH 2 TIMES A DAY., Disp: 60 tablet, Rfl: 5    ziprasidone (GEODON) 40 mg capsule, Take 1 capsule (40 mg total) by mouth every morning, Disp: 90 capsule, Rfl: 0    ziprasidone (GEODON) 80 mg capsule, Take 1 capsule (80 mg total) by mouth every evening, Disp: 90 capsule,  Rfl: 0    Food Intake and Lifestyle Assessment   Food Intake Assessment completed via usual diet recall  Wake:  8a-12p  Breakfast: sometimes will have breakfast or other times doesn't, if has breakfast will have a bagel with cream cheese or cereal (puffins or cheerios) or occasional oatmeal or leftovers (has been better with the portions)  Snack: chips at times OR fruit (banana, oranges, apples--more than 1) OR chobani greek yogurt (2)   Lunch: 2-3pm: If gets up late will depend what times--sandwich (ham and cheese on 2 slices of bread--1 to 2) or nachos   Snack: varies as above   Dinner: 8-9pm:  frozen chicken courden on blue OR chicken amado OR pasta and meatballs OR fish + veggie fried rice ( Sukumar)  Snack: night snacking often  Bed: 11:30pm to 3am  **portions are the issue and night snacking**    Beverage intake: water, coffee/tea, and 100mg caffeine energy drink (5 cals), sugarfree drink  Protein supplement: has a protein powder, but doesn't use often.  Estimated protein intake per day: 80-100gm  Estimated fluid intake per day: 48-64oz water per day, 1 energy drink (100mg caffeine), 12oz coffee with sugar and fat free half and half  Meals eaten away from home: [place] once a week, pizza once a week (usually 3 slices)  Typical meal pattern: 2-3 meals per day and grazes on snacks per day  Eating Behaviors: Consumption of high calorie/ high fat foods, Consumption of high calorie beverages, Large portion sizes, Frequent snacking/ grazing, Mindless eating, Emotional eating, Craves sweet foods, and Craves salty foods    Food allergies or intolerances:   Allergies   Allergen Reactions    Abilify [Aripiprazole]     Lithium     Lorazepam Other (See Comments)     aggressive    Seroquel [Quetiapine]      Cultural or Anglican considerations: -    Physical Assessment  Physical Activity  Types of exercise: gym 4x per week: treadmill 2 x per week for 20-30mins, 2 classes per week (strength and a HIIT class)--has  "been doing this regimen for over 1 year   Current physical limitations: -    Psychosocial Assessment   Support systems: parent(s)  Socioeconomic factors: lives with parents--mom does both cooking and food shopping    Nutrition Diagnosis  Diagnosis: Overweight / Obesity (NC-3.3)  Related to: Physical inactivity and Excessive energy intake  As Evidenced by: BMI >25     Nutrition Prescription: Recommend the following diet  Regular    Interventions and Teaching   Discussed pre-op and post-op nutrition guidelines.       Patient educated and handouts provided.  Surgical changes to stomach / GI  Capacity of post-surgery stomach  Diet progression  Adequate hydration  Sugar and fat restriction to decrease \"dumping syndrome\"  Fat restriction to decrease steatorrhea  Expected weight loss  Weight loss plateaus/ possibility of weight regain  Exercise  Suggestions for pre-op diet  Nutrition considerations after surgery  Protein supplements  Meal planning and preparation  Appropriate carbohydrate, protein, and fat intake, and food/fluid choices to maximize safe weight loss, nutrient intake, and tolerance   Dietary and lifestyle changes  Possible problems with poor eating habits  Intuitive eating  Techniques for self monitoring and keeping daily food journal  Potential for food intolerance after surgery, and ways to deal with them including: lactose intolerance, nausea, reflux, vomiting, diarrhea, food intolerance, appetite changes, gas  Vitamin / Mineral supplementation of Multivitamin with minerals and Vitamin D    Education provided to: patient    Barriers to learning: No barriers identified    Readiness to change: preparation    Prior research on procedure: books, internet, discussed with provider, and friends or family    Comprehension: verbalizes understanding     Expected Compliance: good    Recommendations  Pt is an appropriate candidate for surgery, if can show lifestyle changes over weight checks.    Evaluation / " "Monitoring  Dietitian to Monitor: Eating pattern as discussed Tolerance of nutrition prescription Body weight Lab values Physical activity    Pre-op weight goals:  Do NOT Gain  Can go to BMI of 35:   217#  Encouraged weight loss w/ diet and lifestyle changes  Will be started on a 2 week liver shrinking diet, possibly shorter/longer as per the discretion of the surgeon/team, directly prior to surgery    Goals  Eliminate sugar sweetened beverages  Food journal via baritastic 3-4 days per week  Exercise 30 minutes 5 times per week--continue with gym, increase activity of daily living  Complete lesson plans 1-6  Eat 3 meals per day with protein at each meal  Eat or protein shake within one hour of waking  Eliminate mindless snacking  Use \"plate method\" with when setting up meals  Will f/u with SW on 5/20 and RD on 6/20  Will give pre-op lab rx on 6/20    Time Spent:   1 Hour   " Statement Selected

## 2024-05-21 ENCOUNTER — TRANSCRIPTION ENCOUNTER (OUTPATIENT)
Age: 69
End: 2024-05-21

## 2024-06-24 ENCOUNTER — APPOINTMENT (OUTPATIENT)
Dept: PULMONOLOGY | Facility: CLINIC | Age: 69
End: 2024-06-24
Payer: MEDICARE

## 2024-06-24 VITALS
HEART RATE: 98 BPM | SYSTOLIC BLOOD PRESSURE: 128 MMHG | TEMPERATURE: 97.2 F | WEIGHT: 208 LBS | RESPIRATION RATE: 18 BRPM | DIASTOLIC BLOOD PRESSURE: 80 MMHG | HEIGHT: 76 IN | OXYGEN SATURATION: 98 % | BODY MASS INDEX: 25.33 KG/M2

## 2024-06-24 DIAGNOSIS — K21.9 GASTRO-ESOPHAGEAL REFLUX DISEASE W/OUT ESOPHAGITIS: ICD-10-CM

## 2024-06-24 DIAGNOSIS — R93.89 ABNORMAL FINDINGS ON DIAGNOSTIC IMAGING OF OTHER SPECIFIED BODY STRUCTURES: ICD-10-CM

## 2024-06-24 DIAGNOSIS — G47.33 OBSTRUCTIVE SLEEP APNEA (ADULT) (PEDIATRIC): ICD-10-CM

## 2024-06-24 DIAGNOSIS — R94.2 ABNORMAL RESULTS OF PULMONARY FUNCTION STUDIES: ICD-10-CM

## 2024-06-24 DIAGNOSIS — R06.02 SHORTNESS OF BREATH: ICD-10-CM

## 2024-06-24 DIAGNOSIS — J30.9 ALLERGIC RHINITIS, UNSPECIFIED: ICD-10-CM

## 2024-06-24 DIAGNOSIS — J44.9 CHRONIC OBSTRUCTIVE PULMONARY DISEASE, UNSPECIFIED: ICD-10-CM

## 2024-06-24 DIAGNOSIS — C34.92 MALIGNANT NEOPLASM OF UNSPECIFIED PART OF LEFT BRONCHUS OR LUNG: ICD-10-CM

## 2024-06-24 PROCEDURE — 95012 NITRIC OXIDE EXP GAS DETER: CPT

## 2024-06-24 PROCEDURE — 99214 OFFICE O/P EST MOD 30 MIN: CPT | Mod: 25

## 2024-06-24 PROCEDURE — 94729 DIFFUSING CAPACITY: CPT

## 2024-06-24 PROCEDURE — 94727 GAS DIL/WSHOT DETER LNG VOL: CPT

## 2024-06-24 PROCEDURE — 94010 BREATHING CAPACITY TEST: CPT

## 2024-06-24 RX ORDER — UMECLIDINIUM 62.5 UG/1
62.5 AEROSOL, POWDER ORAL
Qty: 3 | Refills: 1 | Status: ACTIVE | COMMUNITY
Start: 2023-04-11 | End: 1900-01-01

## 2024-06-24 RX ORDER — ALBUTEROL SULFATE 2.5 MG/3ML
(2.5 MG/3ML) SOLUTION RESPIRATORY (INHALATION) 4 TIMES DAILY
Qty: 360 | Refills: 1 | Status: ACTIVE | COMMUNITY
Start: 2018-03-21 | End: 1900-01-01

## 2024-07-18 ENCOUNTER — TRANSCRIPTION ENCOUNTER (OUTPATIENT)
Age: 69
End: 2024-07-18

## 2024-08-08 ENCOUNTER — APPOINTMENT (OUTPATIENT)
Dept: INFECTIOUS DISEASE | Facility: CLINIC | Age: 69
End: 2024-08-08

## 2024-08-08 PROBLEM — N39.0 ACUTE UTI: Status: ACTIVE | Noted: 2024-08-08 | Resolved: 2024-09-07

## 2024-08-08 PROCEDURE — 99214 OFFICE O/P EST MOD 30 MIN: CPT

## 2024-08-08 NOTE — ASSESSMENT
[FreeTextEntry1] : This 69-year-old man with history of lung cancer, multiple prostate surgeries for prostate enlargement with a recent cystoscopy at the urologist office on July 15, 2020 for leading to a urinary tract infection with sepsis being admitted to the hospital from July 17 until July 22.  ESBL Klebsiella pneumonia was found in the cultures.  He was switched over from cefepime/ceftriaxone course to ertapenem on July 22, 2024.  He was then given a 7-day course of Levaquin 750 mg every 24 hours starting on July 23, 2024.  He has finished his course of antibiotics he is now symptom-free.  At the current time no evidence of persistence of urinary tract infection.  I informed the patient that he needs to follow-up with his urologist for his BPH.  I also informed the patient that his primary medical doctors should not checked him for routine urine cultures unless he is symptomatic.  I also advised not to take any medications for nonsymptomatic bacteriuria, as to be consistent with national guidelines.  He understands.  He can follow this office as needed.

## 2024-08-08 NOTE — PHYSICAL EXAM
[General Appearance - Alert] : alert [General Appearance - In No Acute Distress] : in no acute distress [PERRL With Normal Accommodation] : pupils were equal in size, round, reactive to light [Sclera] : the sclera and conjunctiva were normal [Extraocular Movements] : extraocular movements were intact [Outer Ear] : the ears and nose were normal in appearance [Oropharynx] : the oropharynx was normal with no thrush [Neck Appearance] : the appearance of the neck was normal [Neck Cervical Mass (___cm)] : no neck mass was observed [Thyroid Diffuse Enlargement] : the thyroid was not enlarged [Jugular Venous Distention Increased] : there was no jugular-venous distention [Auscultation Breath Sounds / Voice Sounds] : lungs were clear to auscultation bilaterally [Heart Rate And Rhythm] : heart rate was normal and rhythm regular [Heart Sounds] : normal S1 and S2 [Murmurs] : no murmurs [Heart Sounds Gallop] : no gallops [Heart Sounds Pericardial Friction Rub] : no pericardial rub [Full Pulse] : the pedal pulses are present [Edema] : there was no peripheral edema [Bowel Sounds] : normal bowel sounds [Abdomen Soft] : soft [Abdomen Tenderness] : non-tender [Abdomen Mass (___ Cm)] : no abdominal mass palpated [Costovertebral Angle Tenderness] : no CVA tenderness [No Palpable Adenopathy] : no palpable adenopathy [Musculoskeletal - Swelling] : no joint swelling [Nail Clubbing] : no clubbing  or cyanosis of the fingernails [Motor Tone] : muscle strength and tone were normal [Skin Color & Pigmentation] : normal skin color and pigmentation [] : no rash [Cranial Nerves] : cranial nerves 2-12 were intact [Sensation] : the sensory exam was normal to light touch and pinprick [No Focal Deficits] : no focal deficits [Oriented To Time, Place, And Person] : oriented to person, place, and time [Affect] : the affect was normal

## 2024-08-08 NOTE — HISTORY OF PRESENT ILLNESS
[FreeTextEntry1] : 69 y o male who came by EMS reporting feeling weak since yesterday after visiting her mother in law at nursing home. He reported feeling abdominal discomfort, nausea vomiting, fever and chills. His condition became worse and started to feel generalized pain. July 17, 2024, then discharged on July 22, 2024.  Pt has a PMHx of lung CA, s/p lobectomy, biliary stone s/p partial liver resection, prostate CA,s/p TURP, recent cystoscopy at office circa 7/15/24. hospital course was significant for fever on day of admission to 102. 3, His WBC max was 10.x on day of admission.  He was given Cefepime from 7/17 - 7/19, then given Ceftriaxone from 7/20-7/21. Since then his fever resolved his WBC normalized. Urine culture from admission 7/17/24, now with ESBL Klebsiella pneumoniase, Resistant to Cefepime, resistant to ceftriaxone. It is susceptible to Zosyn, Cipro, Levaquin, nitrofurantoin, and carbapenems.  ID was called to see pt.  Patient was ordered for Ertapenem on 7/22/24.  spoke to patient. no perioperative antibiotics for the cystoscopy.  he has a complicated UTI due to recent procedure, due to prostate enlargement.   we spoke at length regarding benefits of IV antibiotics and oral options.  - he can be switched to oral levaquin 750mg PO Q24H x 7 days.  - he does not have a significant cardiac hx,. informed pt of side effects - cardiac risks, tendon/ muscle pains, C diff diarrhea - he understands.  - he would not prefer IV antibiotics.   he will be given a dose of Ertapenem 7/22/24; then starting 7/23 at home Levaquin 750mg PO Q24H x 7days  he was last seen on 7/22/24.  He currently feels well. He had a recent follow-up with his primary medical doctor and that he had blood work done and urine cultures were negative.  He did not have any dysuria at that time.

## 2024-09-18 ENCOUNTER — RX RENEWAL (OUTPATIENT)
Age: 69
End: 2024-09-18

## 2024-09-23 ENCOUNTER — TRANSCRIPTION ENCOUNTER (OUTPATIENT)
Age: 69
End: 2024-09-23

## 2024-09-23 RX ORDER — MOMETASONE FUROATE AND FORMOTEROL FUMARATE DIHYDRATE 200; 5 UG/1; UG/1
200-5 AEROSOL RESPIRATORY (INHALATION)
Qty: 39 | Refills: 1 | Status: ACTIVE | COMMUNITY
Start: 2024-09-23 | End: 1900-01-01

## 2024-10-09 ENCOUNTER — TRANSCRIPTION ENCOUNTER (OUTPATIENT)
Age: 69
End: 2024-10-09

## 2024-10-10 ENCOUNTER — TRANSCRIPTION ENCOUNTER (OUTPATIENT)
Age: 69
End: 2024-10-10

## 2024-10-22 RX ORDER — AMOXICILLIN AND CLAVULANATE POTASSIUM 875; 125 MG/1; MG/1
875-125 TABLET, COATED ORAL TWICE DAILY
Qty: 14 | Refills: 0 | Status: ACTIVE | COMMUNITY
Start: 2024-10-22 | End: 1900-01-01

## 2024-10-31 ENCOUNTER — APPOINTMENT (OUTPATIENT)
Dept: PULMONOLOGY | Facility: CLINIC | Age: 69
End: 2024-10-31
Payer: MEDICARE

## 2024-10-31 VITALS
HEART RATE: 90 BPM | DIASTOLIC BLOOD PRESSURE: 70 MMHG | BODY MASS INDEX: 23.87 KG/M2 | RESPIRATION RATE: 18 BRPM | SYSTOLIC BLOOD PRESSURE: 126 MMHG | HEIGHT: 76 IN | TEMPERATURE: 97.9 F | WEIGHT: 196 LBS | OXYGEN SATURATION: 96 %

## 2024-10-31 DIAGNOSIS — R06.02 SHORTNESS OF BREATH: ICD-10-CM

## 2024-10-31 DIAGNOSIS — J30.9 ALLERGIC RHINITIS, UNSPECIFIED: ICD-10-CM

## 2024-10-31 DIAGNOSIS — E66.3 OVERWEIGHT: ICD-10-CM

## 2024-10-31 DIAGNOSIS — G47.33 OBSTRUCTIVE SLEEP APNEA (ADULT) (PEDIATRIC): ICD-10-CM

## 2024-10-31 DIAGNOSIS — R93.89 ABNORMAL FINDINGS ON DIAGNOSTIC IMAGING OF OTHER SPECIFIED BODY STRUCTURES: ICD-10-CM

## 2024-10-31 DIAGNOSIS — K21.9 GASTRO-ESOPHAGEAL REFLUX DISEASE W/OUT ESOPHAGITIS: ICD-10-CM

## 2024-10-31 DIAGNOSIS — R94.2 ABNORMAL RESULTS OF PULMONARY FUNCTION STUDIES: ICD-10-CM

## 2024-10-31 DIAGNOSIS — J98.4 OTHER DISORDERS OF LUNG: ICD-10-CM

## 2024-10-31 PROCEDURE — 94799 UNLISTED PULMONARY SVC/PX: CPT

## 2024-10-31 PROCEDURE — 95012 NITRIC OXIDE EXP GAS DETER: CPT

## 2024-10-31 PROCEDURE — 94010 BREATHING CAPACITY TEST: CPT

## 2024-10-31 PROCEDURE — 99214 OFFICE O/P EST MOD 30 MIN: CPT | Mod: 25

## 2024-10-31 PROCEDURE — ZZZZZ: CPT

## 2024-11-13 RX ORDER — BLOOD-GLUCOSE METER
KIT MISCELLANEOUS
Qty: 1 | Refills: 0 | Status: ACTIVE | COMMUNITY
Start: 2024-11-13 | End: 1900-01-01

## 2024-11-13 RX ORDER — ENSIFENTRINE 3 MG/2.5ML
3 SUSPENSION RESPIRATORY (INHALATION)
Qty: 1 | Refills: 11 | Status: ACTIVE | COMMUNITY
Start: 2024-11-06 | End: 1900-01-01

## 2024-11-20 ENCOUNTER — TRANSCRIPTION ENCOUNTER (OUTPATIENT)
Age: 69
End: 2024-11-20

## 2024-11-20 RX ORDER — AZITHROMYCIN 500 MG/1
500 TABLET, FILM COATED ORAL DAILY
Qty: 7 | Refills: 0 | Status: ACTIVE | COMMUNITY
Start: 2024-11-20 | End: 1900-01-01

## 2024-12-17 ENCOUNTER — TRANSCRIPTION ENCOUNTER (OUTPATIENT)
Age: 69
End: 2024-12-17

## 2024-12-18 ENCOUNTER — APPOINTMENT (OUTPATIENT)
Dept: DERMATOLOGY | Facility: CLINIC | Age: 69
End: 2024-12-18
Payer: MEDICARE

## 2024-12-18 PROCEDURE — 17000 DESTRUCT PREMALG LESION: CPT | Mod: 59

## 2024-12-18 PROCEDURE — 99213 OFFICE O/P EST LOW 20 MIN: CPT | Mod: 25

## 2024-12-18 PROCEDURE — 17110 DESTRUCTION B9 LES UP TO 14: CPT

## 2025-01-06 ENCOUNTER — TRANSCRIPTION ENCOUNTER (OUTPATIENT)
Age: 70
End: 2025-01-06

## 2025-01-08 ENCOUNTER — TRANSCRIPTION ENCOUNTER (OUTPATIENT)
Age: 70
End: 2025-01-08

## 2025-01-08 RX ORDER — UMECLIDINIUM 62.5 UG/1
62.5 AEROSOL, POWDER ORAL
Qty: 3 | Refills: 1 | Status: ACTIVE | COMMUNITY
Start: 2025-01-08 | End: 1900-01-01

## 2025-01-08 RX ORDER — BUDESONIDE AND FORMOTEROL FUMARATE DIHYDRATE 160; 4.5 UG/1; UG/1
160-4.5 AEROSOL RESPIRATORY (INHALATION) TWICE DAILY
Qty: 3 | Refills: 1 | Status: ACTIVE | COMMUNITY
Start: 2025-01-08 | End: 1900-01-01

## 2025-02-25 ENCOUNTER — NON-APPOINTMENT (OUTPATIENT)
Age: 70
End: 2025-02-25

## 2025-02-26 ENCOUNTER — TRANSCRIPTION ENCOUNTER (OUTPATIENT)
Age: 70
End: 2025-02-26

## 2025-02-27 ENCOUNTER — NON-APPOINTMENT (OUTPATIENT)
Age: 70
End: 2025-02-27

## 2025-03-07 ENCOUNTER — RX RENEWAL (OUTPATIENT)
Age: 70
End: 2025-03-07

## 2025-04-09 ENCOUNTER — APPOINTMENT (OUTPATIENT)
Dept: PULMONOLOGY | Facility: CLINIC | Age: 70
End: 2025-04-09

## 2025-04-14 ENCOUNTER — TRANSCRIPTION ENCOUNTER (OUTPATIENT)
Age: 70
End: 2025-04-14

## 2025-05-22 ENCOUNTER — APPOINTMENT (OUTPATIENT)
Dept: PULMONOLOGY | Facility: CLINIC | Age: 70
End: 2025-05-22
Payer: MEDICARE

## 2025-05-22 ENCOUNTER — NON-APPOINTMENT (OUTPATIENT)
Age: 70
End: 2025-05-22

## 2025-05-22 VITALS
TEMPERATURE: 96.5 F | SYSTOLIC BLOOD PRESSURE: 124 MMHG | OXYGEN SATURATION: 98 % | HEART RATE: 89 BPM | BODY MASS INDEX: 25.21 KG/M2 | WEIGHT: 207 LBS | RESPIRATION RATE: 18 BRPM | HEIGHT: 76 IN | DIASTOLIC BLOOD PRESSURE: 76 MMHG

## 2025-05-22 DIAGNOSIS — R93.89 ABNORMAL FINDINGS ON DIAGNOSTIC IMAGING OF OTHER SPECIFIED BODY STRUCTURES: ICD-10-CM

## 2025-05-22 DIAGNOSIS — C34.92 MALIGNANT NEOPLASM OF UNSPECIFIED PART OF LEFT BRONCHUS OR LUNG: ICD-10-CM

## 2025-05-22 DIAGNOSIS — R94.2 ABNORMAL RESULTS OF PULMONARY FUNCTION STUDIES: ICD-10-CM

## 2025-05-22 DIAGNOSIS — K21.9 GASTRO-ESOPHAGEAL REFLUX DISEASE W/OUT ESOPHAGITIS: ICD-10-CM

## 2025-05-22 DIAGNOSIS — E66.3 OVERWEIGHT: ICD-10-CM

## 2025-05-22 DIAGNOSIS — J30.9 ALLERGIC RHINITIS, UNSPECIFIED: ICD-10-CM

## 2025-05-22 DIAGNOSIS — G47.33 OBSTRUCTIVE SLEEP APNEA (ADULT) (PEDIATRIC): ICD-10-CM

## 2025-05-22 DIAGNOSIS — R06.02 SHORTNESS OF BREATH: ICD-10-CM

## 2025-05-22 PROCEDURE — ZZZZZ: CPT

## 2025-05-22 PROCEDURE — 95012 NITRIC OXIDE EXP GAS DETER: CPT

## 2025-05-22 PROCEDURE — 94727 GAS DIL/WSHOT DETER LNG VOL: CPT

## 2025-05-22 PROCEDURE — 94729 DIFFUSING CAPACITY: CPT

## 2025-05-22 PROCEDURE — 94010 BREATHING CAPACITY TEST: CPT

## 2025-05-22 PROCEDURE — 99214 OFFICE O/P EST MOD 30 MIN: CPT | Mod: 25

## 2025-05-23 ENCOUNTER — APPOINTMENT (OUTPATIENT)
Dept: PULMONOLOGY | Facility: CLINIC | Age: 70
End: 2025-05-23

## 2025-06-30 ENCOUNTER — TRANSCRIPTION ENCOUNTER (OUTPATIENT)
Age: 70
End: 2025-06-30

## 2025-08-15 ENCOUNTER — APPOINTMENT (OUTPATIENT)
Dept: DERMATOLOGY | Facility: CLINIC | Age: 70
End: 2025-08-15
Payer: MEDICARE

## 2025-08-15 PROCEDURE — 17000 DESTRUCT PREMALG LESION: CPT

## 2025-08-15 PROCEDURE — 17003 DESTRUCT PREMALG LES 2-14: CPT

## 2025-08-15 PROCEDURE — 99212 OFFICE O/P EST SF 10 MIN: CPT | Mod: 25

## 2025-08-18 ENCOUNTER — TRANSCRIPTION ENCOUNTER (OUTPATIENT)
Age: 70
End: 2025-08-18

## (undated) DEVICE — SNARE POLYP SENS 27MM 240CM

## (undated) DEVICE — PACK IV START WITH CHG

## (undated) DEVICE — TUBING IV SET GRAVITY 3Y 100" MACRO

## (undated) DEVICE — CATH IV SAFE BC 20G X 1.16" (PINK)

## (undated) DEVICE — TUBING SUCTION 20FT

## (undated) DEVICE — SNARE POLYP SENS SM 13MM 240CM

## (undated) DEVICE — CLAMP BX HOT RAD JAW 3

## (undated) DEVICE — FOLEY HOLDER STATLOCK 2 WAY ADULT

## (undated) DEVICE — BRUSH COLONOSCOPY CYTOLOGY

## (undated) DEVICE — FORCEP RADIAL JAW 4 JUMBO 2.8MM 3.2MM 240CM ORANGE DISP

## (undated) DEVICE — FORCEP MULTIBITE MULTIPLE SAMPLE 2.4MM 2.8MM 240CM ORANGE DISP

## (undated) DEVICE — BALLOON US ENDO

## (undated) DEVICE — SENSOR O2 FINGER ADULT

## (undated) DEVICE — BIOPSY FORCEP RADIAL JAW 4 STANDARD WITH NEEDLE

## (undated) DEVICE — POLY TRAP ETRAP

## (undated) DEVICE — SUCTION YANKAUER NO CONTROL VENT

## (undated) DEVICE — TRAP SPECIMEN SPUTUM 40CC

## (undated) DEVICE — IRRIGATOR BIO SHIELD

## (undated) DEVICE — BITE BLOCK ADULT 20 X 27MM (GREEN)

## (undated) DEVICE — SYR LUER LOK 50CC

## (undated) DEVICE — Device

## (undated) DEVICE — TUBING SUCTION CONN 6FT STERILE

## (undated) DEVICE — CATH IV SAFE BC 22G X 1" (BLUE)

## (undated) DEVICE — TUBING CAP SET ENDO 24HR USE GI

## (undated) DEVICE — SNARE POLYP MINI ACCUSNR 1.5 X 3CM

## (undated) DEVICE — FORCEP RADIAL JAW 4 HOT BIOPSY DISP

## (undated) DEVICE — SOL INJ NS 0.9% 500ML 2 PORT

## (undated) DEVICE — SNARE EXACTO COLD 9MMX230CM

## (undated) DEVICE — ELCTR GROUNDING PAD ADULT COVIDIEN

## (undated) DEVICE — SYR ALLIANCE II INFLATION 60ML